# Patient Record
Sex: FEMALE | Race: WHITE | Employment: FULL TIME | URBAN - METROPOLITAN AREA
[De-identification: names, ages, dates, MRNs, and addresses within clinical notes are randomized per-mention and may not be internally consistent; named-entity substitution may affect disease eponyms.]

---

## 2017-02-08 RX ORDER — PREGABALIN 150 MG/1
150 CAPSULE ORAL DAILY
Qty: 30 CAPSULE | Refills: 2 | Status: SHIPPED | OUTPATIENT
Start: 2017-02-08 | End: 2017-05-10 | Stop reason: SDUPTHER

## 2017-03-03 DIAGNOSIS — K21.9 GASTROESOPHAGEAL REFLUX DISEASE, ESOPHAGITIS PRESENCE NOT SPECIFIED: Primary | ICD-10-CM

## 2017-03-03 DIAGNOSIS — F32.0 MILD SINGLE CURRENT EPISODE OF MAJOR DEPRESSIVE DISORDER (HCC): ICD-10-CM

## 2017-03-03 RX ORDER — BUPROPION HYDROCHLORIDE 150 MG/1
150 TABLET ORAL EVERY MORNING
Qty: 90 TABLET | Refills: 0 | Status: SHIPPED | OUTPATIENT
Start: 2017-03-03 | End: 2017-06-05 | Stop reason: SDUPTHER

## 2017-03-03 RX ORDER — BUPROPION HYDROCHLORIDE 300 MG/1
300 TABLET ORAL EVERY MORNING
Qty: 90 TABLET | Refills: 0 | Status: SHIPPED | OUTPATIENT
Start: 2017-03-03 | End: 2017-06-05 | Stop reason: SDUPTHER

## 2017-03-03 RX ORDER — PANTOPRAZOLE SODIUM 40 MG/1
40 TABLET, DELAYED RELEASE ORAL DAILY
Qty: 90 TABLET | Refills: 0 | Status: SHIPPED | OUTPATIENT
Start: 2017-03-03 | End: 2017-06-05 | Stop reason: SDUPTHER

## 2017-06-10 ENCOUNTER — TELEPHONE (OUTPATIENT)
Dept: FAMILY MEDICINE CLINIC | Age: 57
End: 2017-06-10

## 2017-06-10 RX ORDER — PREGABALIN 150 MG/1
150 CAPSULE ORAL DAILY
Qty: 30 CAPSULE | Refills: 0 | Status: SHIPPED | OUTPATIENT
Start: 2017-06-10 | End: 2017-07-11 | Stop reason: SDUPTHER

## 2017-06-10 RX ORDER — PREGABALIN 150 MG/1
150 CAPSULE ORAL DAILY
Qty: 30 CAPSULE | Refills: 0 | Status: SHIPPED | OUTPATIENT
Start: 2017-06-10 | End: 2017-06-10 | Stop reason: SDUPTHER

## 2017-06-20 ENCOUNTER — OFFICE VISIT (OUTPATIENT)
Dept: FAMILY MEDICINE CLINIC | Age: 57
End: 2017-06-20

## 2017-06-20 VITALS
DIASTOLIC BLOOD PRESSURE: 60 MMHG | BODY MASS INDEX: 21.99 KG/M2 | OXYGEN SATURATION: 99 % | HEIGHT: 70 IN | TEMPERATURE: 97.6 F | HEART RATE: 80 BPM | WEIGHT: 153.6 LBS | SYSTOLIC BLOOD PRESSURE: 120 MMHG

## 2017-06-20 DIAGNOSIS — E78.9 LIPID DISORDER: ICD-10-CM

## 2017-06-20 DIAGNOSIS — M25.562 CHRONIC PAIN OF BOTH KNEES: Primary | ICD-10-CM

## 2017-06-20 DIAGNOSIS — M25.561 CHRONIC PAIN OF BOTH KNEES: Primary | ICD-10-CM

## 2017-06-20 DIAGNOSIS — F32.0 MILD SINGLE CURRENT EPISODE OF MAJOR DEPRESSIVE DISORDER (HCC): ICD-10-CM

## 2017-06-20 DIAGNOSIS — G89.29 CHRONIC PAIN OF BOTH KNEES: Primary | ICD-10-CM

## 2017-06-20 DIAGNOSIS — E55.9 VITAMIN D DEFICIENCY: ICD-10-CM

## 2017-06-20 DIAGNOSIS — K21.9 GASTROESOPHAGEAL REFLUX DISEASE, ESOPHAGITIS PRESENCE NOT SPECIFIED: ICD-10-CM

## 2017-06-20 PROCEDURE — 99214 OFFICE O/P EST MOD 30 MIN: CPT | Performed by: FAMILY MEDICINE

## 2017-06-20 RX ORDER — MELOXICAM 15 MG/1
15 TABLET ORAL DAILY
Qty: 30 TABLET | Refills: 3 | Status: SHIPPED | OUTPATIENT
Start: 2017-06-20 | End: 2017-09-11 | Stop reason: SDUPTHER

## 2017-06-20 ASSESSMENT — ENCOUNTER SYMPTOMS
GASTROINTESTINAL NEGATIVE: 1
RESPIRATORY NEGATIVE: 1
EYES NEGATIVE: 1

## 2017-07-06 DIAGNOSIS — F32.0 MILD SINGLE CURRENT EPISODE OF MAJOR DEPRESSIVE DISORDER (HCC): ICD-10-CM

## 2017-07-06 DIAGNOSIS — K21.9 GASTROESOPHAGEAL REFLUX DISEASE, ESOPHAGITIS PRESENCE NOT SPECIFIED: ICD-10-CM

## 2017-07-06 RX ORDER — PANTOPRAZOLE SODIUM 40 MG/1
40 TABLET, DELAYED RELEASE ORAL DAILY
Qty: 90 TABLET | Refills: 1 | Status: SHIPPED | OUTPATIENT
Start: 2017-07-06 | End: 2017-10-10 | Stop reason: SDUPTHER

## 2017-07-06 RX ORDER — BUPROPION HYDROCHLORIDE 150 MG/1
150 TABLET ORAL EVERY MORNING
Qty: 90 TABLET | Refills: 1 | Status: SHIPPED | OUTPATIENT
Start: 2017-07-06 | End: 2017-10-11 | Stop reason: SDUPTHER

## 2017-07-06 RX ORDER — BUPROPION HYDROCHLORIDE 300 MG/1
300 TABLET ORAL EVERY MORNING
Qty: 90 TABLET | Refills: 1 | Status: SHIPPED | OUTPATIENT
Start: 2017-07-06 | End: 2017-11-24 | Stop reason: SDUPTHER

## 2017-07-11 RX ORDER — PREGABALIN 150 MG/1
150 CAPSULE ORAL DAILY
Qty: 30 CAPSULE | Refills: 0 | Status: CANCELLED | OUTPATIENT
Start: 2017-07-11

## 2017-07-11 RX ORDER — PREGABALIN 150 MG/1
150 CAPSULE ORAL DAILY
Qty: 90 CAPSULE | Refills: 0 | Status: SHIPPED | OUTPATIENT
Start: 2017-07-11 | End: 2017-09-11 | Stop reason: SDUPTHER

## 2017-09-03 RX ORDER — TRAZODONE HYDROCHLORIDE 100 MG/1
200 TABLET ORAL NIGHTLY
Qty: 180 TABLET | Refills: 1 | Status: SHIPPED | OUTPATIENT
Start: 2017-09-03 | End: 2018-03-06 | Stop reason: SDUPTHER

## 2017-09-11 DIAGNOSIS — G89.29 CHRONIC PAIN OF BOTH KNEES: ICD-10-CM

## 2017-09-11 DIAGNOSIS — M25.561 CHRONIC PAIN OF BOTH KNEES: ICD-10-CM

## 2017-09-11 DIAGNOSIS — M25.562 CHRONIC PAIN OF BOTH KNEES: ICD-10-CM

## 2017-09-11 RX ORDER — PREGABALIN 150 MG/1
150 CAPSULE ORAL DAILY
Qty: 90 CAPSULE | Refills: 0 | Status: SHIPPED | OUTPATIENT
Start: 2017-09-11 | End: 2018-01-06 | Stop reason: SDUPTHER

## 2017-09-11 RX ORDER — MELOXICAM 15 MG/1
15 TABLET ORAL DAILY
Qty: 90 TABLET | Refills: 1 | Status: SHIPPED | OUTPATIENT
Start: 2017-09-11 | End: 2018-05-15 | Stop reason: ALTCHOICE

## 2017-10-10 DIAGNOSIS — K21.9 GASTROESOPHAGEAL REFLUX DISEASE, ESOPHAGITIS PRESENCE NOT SPECIFIED: ICD-10-CM

## 2017-10-10 RX ORDER — PANTOPRAZOLE SODIUM 40 MG/1
40 TABLET, DELAYED RELEASE ORAL DAILY
Qty: 90 TABLET | Refills: 1 | Status: SHIPPED | OUTPATIENT
Start: 2017-10-10 | End: 2018-02-20 | Stop reason: ALTCHOICE

## 2017-10-11 DIAGNOSIS — F32.0 MILD SINGLE CURRENT EPISODE OF MAJOR DEPRESSIVE DISORDER (HCC): ICD-10-CM

## 2017-10-11 RX ORDER — BUPROPION HYDROCHLORIDE 150 MG/1
150 TABLET ORAL EVERY MORNING
Qty: 90 TABLET | Refills: 1 | Status: SHIPPED | OUTPATIENT
Start: 2017-10-11 | End: 2018-02-20 | Stop reason: ALTCHOICE

## 2017-10-26 DIAGNOSIS — K21.9 GASTROESOPHAGEAL REFLUX DISEASE, ESOPHAGITIS PRESENCE NOT SPECIFIED: ICD-10-CM

## 2017-10-26 RX ORDER — RANITIDINE 150 MG/1
150 TABLET ORAL 2 TIMES DAILY
Qty: 180 TABLET | Refills: 0 | Status: SHIPPED | OUTPATIENT
Start: 2017-10-26 | End: 2018-03-10 | Stop reason: SDUPTHER

## 2017-11-24 DIAGNOSIS — F32.0 MILD SINGLE CURRENT EPISODE OF MAJOR DEPRESSIVE DISORDER (HCC): ICD-10-CM

## 2017-11-24 RX ORDER — BUPROPION HYDROCHLORIDE 300 MG/1
300 TABLET ORAL EVERY MORNING
Qty: 90 TABLET | Refills: 1 | Status: SHIPPED | OUTPATIENT
Start: 2017-11-24 | End: 2018-07-16 | Stop reason: SDUPTHER

## 2018-01-06 ENCOUNTER — TELEPHONE (OUTPATIENT)
Dept: FAMILY MEDICINE CLINIC | Age: 58
End: 2018-01-06

## 2018-01-06 RX ORDER — PREGABALIN 150 MG/1
150 CAPSULE ORAL DAILY
Qty: 90 CAPSULE | Refills: 0 | OUTPATIENT
Start: 2018-01-06 | End: 2018-04-03 | Stop reason: SDUPTHER

## 2018-02-07 ENCOUNTER — TELEPHONE (OUTPATIENT)
Dept: FAMILY MEDICINE CLINIC | Age: 58
End: 2018-02-07

## 2018-02-20 ENCOUNTER — OFFICE VISIT (OUTPATIENT)
Dept: GYNECOLOGY | Age: 58
End: 2018-02-20

## 2018-02-20 VITALS
TEMPERATURE: 97.2 F | HEART RATE: 65 BPM | BODY MASS INDEX: 22.54 KG/M2 | SYSTOLIC BLOOD PRESSURE: 128 MMHG | DIASTOLIC BLOOD PRESSURE: 86 MMHG | HEIGHT: 70 IN | WEIGHT: 157.4 LBS | RESPIRATION RATE: 17 BRPM

## 2018-02-20 DIAGNOSIS — N89.8 VAGINAL DRYNESS: ICD-10-CM

## 2018-02-20 DIAGNOSIS — R39.15 URINARY URGENCY: ICD-10-CM

## 2018-02-20 DIAGNOSIS — Z80.3 FAMILY HISTORY OF BREAST CANCER: ICD-10-CM

## 2018-02-20 DIAGNOSIS — Z01.419 WELL WOMAN EXAM WITH ROUTINE GYNECOLOGICAL EXAM: Primary | ICD-10-CM

## 2018-02-20 DIAGNOSIS — N81.10 VAGINAL WALL PROLAPSE: ICD-10-CM

## 2018-02-20 PROCEDURE — 99396 PREV VISIT EST AGE 40-64: CPT | Performed by: OBSTETRICS & GYNECOLOGY

## 2018-02-20 RX ORDER — ESTRADIOL 10 UG/1
10 INSERT VAGINAL
Qty: 24 TABLET | Refills: 3 | Status: SHIPPED | OUTPATIENT
Start: 2018-02-22 | End: 2018-11-15 | Stop reason: ALTCHOICE

## 2018-02-20 ASSESSMENT — ENCOUNTER SYMPTOMS
EYES NEGATIVE: 1
GASTROINTESTINAL NEGATIVE: 1
RESPIRATORY NEGATIVE: 1

## 2018-02-21 NOTE — PROGRESS NOTES
 Not on file     Social History Narrative    No narrative on file     No Known Allergies  Outpatient Prescriptions Marked as Taking for the 2/20/18 encounter (Office Visit) with Benedicto Arredondo MD   Medication Sig Dispense Refill    Esomeprazole Magnesium (NEXIUM PO) Take by mouth      [START ON 2/22/2018] Estradiol (VAGIFEM) 10 MCG TABS vaginal tablet Place 1 tablet vaginally Twice a Week 24 tablet 3    pregabalin (LYRICA) 150 MG capsule Take 1 capsule by mouth daily for 90 days. 90 capsule 0    buPROPion (WELLBUTRIN XL) 300 MG extended release tablet Take 1 tablet by mouth every morning 90 tablet 1    ranitidine (ZANTAC) 150 MG tablet Take 1 tablet by mouth 2 times daily 180 tablet 0    meloxicam (MOBIC) 15 MG tablet Take 1 tablet by mouth daily 90 tablet 1    traZODone (DESYREL) 100 MG tablet Take 2 tablets by mouth nightly 180 tablet 1    ibuprofen (ADVIL;MOTRIN) 200 MG tablet Take 200 mg by mouth every 6 hours as needed for Pain. Family History   Problem Relation Age of Onset    High Blood Pressure Mother     High Cholesterol Mother     High Blood Pressure Father     High Cholesterol Father     Arthritis Father     Arthritis Paternal Grandmother     Rheum Arthritis Neg Hx     Osteoarthritis Neg Hx     Asthma Neg Hx     Breast Cancer Neg Hx     Cancer Neg Hx     Diabetes Neg Hx     Heart Failure Neg Hx     Hypertension Neg Hx     Migraines Neg Hx     Ovarian Cancer Neg Hx     Rashes/Skin Problems Neg Hx     Seizures Neg Hx     Stroke Neg Hx     Thyroid Disease Neg Hx      /86 (Site: Right Arm, Position: Sitting, Cuff Size: Medium Adult)   Pulse 65   Temp 97.2 °F (36.2 °C) (Oral)   Resp 17   Ht 5' 10\" (1.778 m)   Wt 157 lb 6.4 oz (71.4 kg)   LMP  (LMP Unknown)   Breastfeeding? No   BMI 22.58 kg/m²       Objective:   Physical Exam   Constitutional: She is oriented to person, place, and time. She appears well-developed and well-nourished. No distress.    HENT: Assessment:      1. Annual  2. Vaginal atrophy  3. Cystocele, ? Dropping vaginal apex  4. Urinary urgency  5. Family hx breast Ca      Plan:       1. Pap, calcium, exercise, mammogram, hemocult negative  2. vagifem  3 and 4. Referral to pelvic  given findings. Check UC as well. 5. Mammogram with dense breasts but I see this a lot.  Given hx-feel referral to breast MD better option for future evaluation

## 2018-02-22 LAB — URINE CULTURE, ROUTINE: NORMAL

## 2018-02-23 LAB
HPV COMMENT: NORMAL
HPV TYPE 16: NOT DETECTED
HPV TYPE 18: NOT DETECTED
HPVOH (OTHER TYPES): NOT DETECTED

## 2018-03-06 RX ORDER — TRAZODONE HYDROCHLORIDE 100 MG/1
200 TABLET ORAL NIGHTLY
Qty: 180 TABLET | Refills: 1 | Status: SHIPPED | OUTPATIENT
Start: 2018-03-06 | End: 2018-07-15

## 2018-03-10 DIAGNOSIS — K21.9 GASTROESOPHAGEAL REFLUX DISEASE, ESOPHAGITIS PRESENCE NOT SPECIFIED: ICD-10-CM

## 2018-03-11 RX ORDER — RANITIDINE 150 MG/1
150 TABLET ORAL 2 TIMES DAILY
Qty: 180 TABLET | Refills: 1 | Status: SHIPPED | OUTPATIENT
Start: 2018-03-11 | End: 2018-11-15 | Stop reason: ALTCHOICE

## 2018-05-15 ENCOUNTER — OFFICE VISIT (OUTPATIENT)
Dept: FAMILY MEDICINE CLINIC | Age: 58
End: 2018-05-15

## 2018-05-15 VITALS
WEIGHT: 161 LBS | BODY MASS INDEX: 23.1 KG/M2 | DIASTOLIC BLOOD PRESSURE: 84 MMHG | TEMPERATURE: 98 F | SYSTOLIC BLOOD PRESSURE: 128 MMHG

## 2018-05-15 DIAGNOSIS — G89.29 CHRONIC PAIN OF BOTH KNEES: Primary | ICD-10-CM

## 2018-05-15 DIAGNOSIS — M25.562 CHRONIC PAIN OF BOTH KNEES: Primary | ICD-10-CM

## 2018-05-15 DIAGNOSIS — M25.561 CHRONIC PAIN OF BOTH KNEES: Primary | ICD-10-CM

## 2018-05-15 DIAGNOSIS — Z11.59 ENCOUNTER FOR HEPATITIS C SCREENING TEST FOR LOW RISK PATIENT: ICD-10-CM

## 2018-05-15 DIAGNOSIS — G89.4 CHRONIC PAIN SYNDROME: ICD-10-CM

## 2018-05-15 DIAGNOSIS — E78.2 MIXED HYPERLIPIDEMIA: ICD-10-CM

## 2018-05-15 DIAGNOSIS — E55.9 VITAMIN D DEFICIENCY: ICD-10-CM

## 2018-05-15 DIAGNOSIS — K21.9 GASTROESOPHAGEAL REFLUX DISEASE, ESOPHAGITIS PRESENCE NOT SPECIFIED: ICD-10-CM

## 2018-05-15 DIAGNOSIS — Z11.4 SCREENING FOR HIV (HUMAN IMMUNODEFICIENCY VIRUS): ICD-10-CM

## 2018-05-15 PROCEDURE — 99214 OFFICE O/P EST MOD 30 MIN: CPT | Performed by: FAMILY MEDICINE

## 2018-05-15 RX ORDER — PREGABALIN 150 MG/1
CAPSULE ORAL
Qty: 90 CAPSULE | Refills: 1 | Status: SHIPPED | OUTPATIENT
Start: 2018-05-15 | End: 2018-11-15 | Stop reason: SDUPTHER

## 2018-05-15 ASSESSMENT — ENCOUNTER SYMPTOMS
RESPIRATORY NEGATIVE: 1
GASTROINTESTINAL NEGATIVE: 1
EYES NEGATIVE: 1

## 2018-07-15 DIAGNOSIS — F32.0 MILD SINGLE CURRENT EPISODE OF MAJOR DEPRESSIVE DISORDER (HCC): ICD-10-CM

## 2018-07-15 RX ORDER — BUPROPION HYDROCHLORIDE 150 MG/1
150 TABLET ORAL EVERY MORNING
Qty: 90 TABLET | Refills: 0 | Status: SHIPPED | OUTPATIENT
Start: 2018-07-15 | End: 2018-11-15 | Stop reason: ALTCHOICE

## 2018-07-15 NOTE — TELEPHONE ENCOUNTER
Have you picked out a new primary care physician? Because I am retiring after July 27th. Pratima Bansal is no longer able to see you at the office. If not, please call [phone] to help locate a [facility] doctor near to your home or office.

## 2018-07-16 DIAGNOSIS — F32.0 MILD SINGLE CURRENT EPISODE OF MAJOR DEPRESSIVE DISORDER (HCC): ICD-10-CM

## 2018-07-16 RX ORDER — BUPROPION HYDROCHLORIDE 300 MG/1
300 TABLET ORAL EVERY MORNING
Qty: 90 TABLET | Refills: 0 | Status: SHIPPED | OUTPATIENT
Start: 2018-07-16 | End: 2018-11-15 | Stop reason: SDUPTHER

## 2018-07-16 NOTE — TELEPHONE ENCOUNTER
Have you picked out a new primary care physician? Because I am retiring after July 27th. Ulises Ortiz is no longer able to see you at the office. If not, please call [phone] to help locate a [facility] doctor near to your home or office.

## 2018-08-22 ENCOUNTER — TELEPHONE (OUTPATIENT)
Dept: FAMILY MEDICINE CLINIC | Age: 58
End: 2018-08-22

## 2018-10-25 ENCOUNTER — TELEPHONE (OUTPATIENT)
Dept: FAMILY MEDICINE CLINIC | Age: 58
End: 2018-10-25

## 2018-10-26 RX ORDER — PREGABALIN 150 MG/1
CAPSULE ORAL
Qty: 90 CAPSULE | Refills: 0 | OUTPATIENT
Start: 2018-10-26 | End: 2019-10-26

## 2018-10-26 NOTE — TELEPHONE ENCOUNTER
Attempted to call patient. Lyrica is scheduled medication. Patient must be seen, submit to UDS, have CDA on file even for bridge until sees new PCP on 12/7/18. Please advise patient, she must be seen for refill. Please schedule at patient's convenience, it sounds like she has 14 pills still on hand. Magda Galeazzi.  Keyanna Hahn.  10/26/2018  9:54 AM

## 2018-11-15 ENCOUNTER — OFFICE VISIT (OUTPATIENT)
Dept: FAMILY MEDICINE CLINIC | Age: 58
End: 2018-11-15
Payer: COMMERCIAL

## 2018-11-15 VITALS
SYSTOLIC BLOOD PRESSURE: 106 MMHG | DIASTOLIC BLOOD PRESSURE: 70 MMHG | BODY MASS INDEX: 22.85 KG/M2 | HEIGHT: 70 IN | HEART RATE: 62 BPM | WEIGHT: 159.6 LBS | OXYGEN SATURATION: 98 %

## 2018-11-15 DIAGNOSIS — M25.561 CHRONIC PAIN OF BOTH KNEES: ICD-10-CM

## 2018-11-15 DIAGNOSIS — M25.562 CHRONIC PAIN OF BOTH KNEES: ICD-10-CM

## 2018-11-15 DIAGNOSIS — E55.9 VITAMIN D DEFICIENCY: ICD-10-CM

## 2018-11-15 DIAGNOSIS — G89.29 CHRONIC PAIN OF BOTH SHOULDERS: ICD-10-CM

## 2018-11-15 DIAGNOSIS — M25.511 CHRONIC PAIN OF BOTH SHOULDERS: ICD-10-CM

## 2018-11-15 DIAGNOSIS — G89.29 CHRONIC PAIN OF BOTH KNEES: ICD-10-CM

## 2018-11-15 DIAGNOSIS — Z76.89 ENCOUNTER TO ESTABLISH CARE: ICD-10-CM

## 2018-11-15 DIAGNOSIS — F32.0 MILD SINGLE CURRENT EPISODE OF MAJOR DEPRESSIVE DISORDER (HCC): Primary | ICD-10-CM

## 2018-11-15 DIAGNOSIS — M25.512 CHRONIC PAIN OF BOTH SHOULDERS: ICD-10-CM

## 2018-11-15 PROCEDURE — 90471 IMMUNIZATION ADMIN: CPT | Performed by: NURSE PRACTITIONER

## 2018-11-15 PROCEDURE — 90686 IIV4 VACC NO PRSV 0.5 ML IM: CPT | Performed by: NURSE PRACTITIONER

## 2018-11-15 PROCEDURE — 99214 OFFICE O/P EST MOD 30 MIN: CPT | Performed by: NURSE PRACTITIONER

## 2018-11-15 RX ORDER — PREGABALIN 150 MG/1
150 CAPSULE ORAL DAILY
Qty: 90 CAPSULE | Refills: 1 | Status: SHIPPED | OUTPATIENT
Start: 2018-11-15 | End: 2019-04-18 | Stop reason: SDUPTHER

## 2018-11-15 RX ORDER — BUPROPION HYDROCHLORIDE 300 MG/1
300 TABLET ORAL EVERY MORNING
Qty: 90 TABLET | Refills: 1 | Status: SHIPPED | OUTPATIENT
Start: 2018-11-15 | End: 2019-04-18 | Stop reason: SDUPTHER

## 2018-11-15 ASSESSMENT — ENCOUNTER SYMPTOMS: SHORTNESS OF BREATH: 0

## 2018-11-15 NOTE — PATIENT INSTRUCTIONS
Patient Education        Depression Treatment: Care Instructions  Your Care Instructions    Depression is a condition that affects the way you feel, think, and act. It causes symptoms such as low energy, loss of interest in daily activities, and sadness or grouchiness that goes on for a long time. Depression is very common and affects men and women of all ages. Depression is a medical illness caused by changes in the natural chemicals in your brain. It is not a character flaw, and it does not mean that you are a bad or weak person. It does not mean that you are going crazy. It is important to know that depression can be treated. Medicines, counseling, and self-care can all help. Many people do not get help because they are embarrassed or think that they will get over the depression on their own. But some people do not get better without treatment. Follow-up care is a key part of your treatment and safety. Be sure to make and go to all appointments, and call your doctor if you are having problems. It's also a good idea to know your test results and keep a list of the medicines you take. How can you care for yourself at home? Learn about antidepressant medicines  Antidepressant medicines can improve or end the symptoms of depression. You may need to take the medicine for at least 6 months, and often longer. Keep taking your medicine even if you feel better. If you stop taking it too soon, your symptoms may come back or get worse. You may start to feel better within 1 to 3 weeks of taking antidepressant medicine. But it can take as many as 6 to 8 weeks to see more improvement. Talk to your doctor if you have problems with your medicine or if you do not notice any improvement after 3 weeks. Antidepressants can make you feel tired, dizzy, or nervous. Some people have dry mouth, constipation, headaches, sexual problems, an upset stomach, or diarrhea.  Many of these side effects are mild and go away on their own after you take the medicine for a few weeks. Some may last longer. Talk to your doctor if side effects bother you too much. You might be able to try a different medicine. If you are pregnant or breastfeeding, talk to your doctor about what medicines you can take. Learn about counseling  In many cases, counseling can work as well as medicines to treat mild to moderate depression. Counseling is done by licensed mental health providers, such as psychologists, social workers, and some types of nurses. It can be done in one-on-one sessions or in a group setting. Many people find group sessions helpful. Cognitive-behavioral therapy is a type of counseling. In this treatment therapy, you learn how to see and change unhelpful thinking styles that may be adding to your depression. Counseling and medicines often work well when used together. To manage depression  · Be physically active. Getting 30 minutes of exercise each day is good for your body and your mind. Begin slowly if it is hard for you to get started. If you already exercise, keep it up. · Plan something pleasant for yourself every day. Include activities that you have enjoyed in the past.  · Get enough sleep. Talk to your doctor if you have problems sleeping. · Eat a balanced diet. If you do not feel hungry, eat small snacks rather than large meals. · Do not drink alcohol, use illegal drugs, or take medicines that your doctor has not prescribed for you. They may interfere with your treatment. · Spend time with family and friends. It may help to speak openly about your depression with people you trust.  · Take your medicines exactly as prescribed. Call your doctor if you think you are having a problem with your medicine. · Do not make major life decisions while you are depressed. Depression may change the way you think. You will be able to make better decisions after you feel better. · Think positively.  Challenge negative thoughts with statements such as

## 2019-04-18 ENCOUNTER — OFFICE VISIT (OUTPATIENT)
Dept: FAMILY MEDICINE CLINIC | Age: 59
End: 2019-04-18
Payer: COMMERCIAL

## 2019-04-18 VITALS
OXYGEN SATURATION: 99 % | BODY MASS INDEX: 22.83 KG/M2 | WEIGHT: 158 LBS | DIASTOLIC BLOOD PRESSURE: 70 MMHG | HEART RATE: 64 BPM | SYSTOLIC BLOOD PRESSURE: 104 MMHG

## 2019-04-18 DIAGNOSIS — Z11.59 NEED FOR HEPATITIS C SCREENING TEST: ICD-10-CM

## 2019-04-18 DIAGNOSIS — M25.562 CHRONIC PAIN OF BOTH KNEES: ICD-10-CM

## 2019-04-18 DIAGNOSIS — Z00.00 WELL ADULT EXAM: Primary | ICD-10-CM

## 2019-04-18 DIAGNOSIS — M25.511 CHRONIC PAIN OF BOTH SHOULDERS: ICD-10-CM

## 2019-04-18 DIAGNOSIS — M25.561 CHRONIC PAIN OF BOTH KNEES: ICD-10-CM

## 2019-04-18 DIAGNOSIS — Z11.4 ENCOUNTER FOR SCREENING FOR HIV: ICD-10-CM

## 2019-04-18 DIAGNOSIS — M81.0 POSTMENOPAUSAL OSTEOPOROSIS: ICD-10-CM

## 2019-04-18 DIAGNOSIS — G89.29 CHRONIC PAIN OF BOTH KNEES: ICD-10-CM

## 2019-04-18 DIAGNOSIS — F32.0 CURRENT MILD EPISODE OF MAJOR DEPRESSIVE DISORDER WITHOUT PRIOR EPISODE (HCC): ICD-10-CM

## 2019-04-18 DIAGNOSIS — Z00.00 WELL ADULT EXAM: ICD-10-CM

## 2019-04-18 DIAGNOSIS — Z12.11 SCREENING FOR COLON CANCER: ICD-10-CM

## 2019-04-18 DIAGNOSIS — M25.512 CHRONIC PAIN OF BOTH SHOULDERS: ICD-10-CM

## 2019-04-18 DIAGNOSIS — G89.29 CHRONIC PAIN OF BOTH SHOULDERS: ICD-10-CM

## 2019-04-18 LAB
A/G RATIO: 1.9 (ref 1.1–2.2)
ALBUMIN SERPL-MCNC: 4.5 G/DL (ref 3.4–5)
ALP BLD-CCNC: 65 U/L (ref 40–129)
ALT SERPL-CCNC: 11 U/L (ref 10–40)
ANION GAP SERPL CALCULATED.3IONS-SCNC: 10 MMOL/L (ref 3–16)
AST SERPL-CCNC: 15 U/L (ref 15–37)
BASOPHILS ABSOLUTE: 0 K/UL (ref 0–0.2)
BASOPHILS RELATIVE PERCENT: 0.9 %
BILIRUB SERPL-MCNC: 0.5 MG/DL (ref 0–1)
BILIRUBIN, POC: NORMAL
BLOOD URINE, POC: NORMAL
BUN BLDV-MCNC: 11 MG/DL (ref 7–20)
CALCIUM SERPL-MCNC: 9.8 MG/DL (ref 8.3–10.6)
CHLORIDE BLD-SCNC: 101 MMOL/L (ref 99–110)
CHOLESTEROL, FASTING: 219 MG/DL (ref 0–199)
CLARITY, POC: NORMAL
CO2: 27 MMOL/L (ref 21–32)
COLOR, POC: YELLOW
CREAT SERPL-MCNC: 1 MG/DL (ref 0.6–1.1)
EOSINOPHILS ABSOLUTE: 0.2 K/UL (ref 0–0.6)
EOSINOPHILS RELATIVE PERCENT: 4 %
GFR AFRICAN AMERICAN: >60
GFR NON-AFRICAN AMERICAN: 57
GLOBULIN: 2.4 G/DL
GLUCOSE FASTING: 100 MG/DL (ref 70–99)
GLUCOSE URINE, POC: NORMAL
HCT VFR BLD CALC: 39.5 % (ref 36–48)
HDLC SERPL-MCNC: 77 MG/DL (ref 40–60)
HEMOGLOBIN: 13.3 G/DL (ref 12–16)
HEPATITIS C ANTIBODY INTERPRETATION: NORMAL
KETONES, POC: NORMAL
LDL CHOLESTEROL CALCULATED: 132 MG/DL
LEUKOCYTE EST, POC: NORMAL
LYMPHOCYTES ABSOLUTE: 1.2 K/UL (ref 1–5.1)
LYMPHOCYTES RELATIVE PERCENT: 26.8 %
MCH RBC QN AUTO: 29.7 PG (ref 26–34)
MCHC RBC AUTO-ENTMCNC: 33.7 G/DL (ref 31–36)
MCV RBC AUTO: 88.2 FL (ref 80–100)
MONOCYTES ABSOLUTE: 0.5 K/UL (ref 0–1.3)
MONOCYTES RELATIVE PERCENT: 10.3 %
NEUTROPHILS ABSOLUTE: 2.6 K/UL (ref 1.7–7.7)
NEUTROPHILS RELATIVE PERCENT: 58 %
NITRITE, POC: NORMAL
PDW BLD-RTO: 12.8 % (ref 12.4–15.4)
PH, POC: 6
PLATELET # BLD: 294 K/UL (ref 135–450)
PMV BLD AUTO: 8.7 FL (ref 5–10.5)
POTASSIUM SERPL-SCNC: 5.1 MMOL/L (ref 3.5–5.1)
PROTEIN, POC: NORMAL
RBC # BLD: 4.48 M/UL (ref 4–5.2)
SODIUM BLD-SCNC: 138 MMOL/L (ref 136–145)
SPECIFIC GRAVITY, POC: >=1.03
TOTAL PROTEIN: 6.9 G/DL (ref 6.4–8.2)
TRIGLYCERIDE, FASTING: 49 MG/DL (ref 0–150)
TSH REFLEX FT4: 2.45 UIU/ML (ref 0.27–4.2)
UROBILINOGEN, POC: 0.2
VITAMIN D 25-HYDROXY: 30.6 NG/ML
VLDLC SERPL CALC-MCNC: 10 MG/DL
WBC # BLD: 4.6 K/UL (ref 4–11)

## 2019-04-18 PROCEDURE — 99396 PREV VISIT EST AGE 40-64: CPT | Performed by: NURSE PRACTITIONER

## 2019-04-18 PROCEDURE — 81002 URINALYSIS NONAUTO W/O SCOPE: CPT | Performed by: NURSE PRACTITIONER

## 2019-04-18 RX ORDER — PREGABALIN 150 MG/1
150 CAPSULE ORAL DAILY
Qty: 90 CAPSULE | Refills: 3 | Status: SHIPPED | OUTPATIENT
Start: 2019-04-18 | End: 2019-05-06 | Stop reason: SDUPTHER

## 2019-04-18 RX ORDER — TRAZODONE HYDROCHLORIDE 100 MG/1
200 TABLET ORAL NIGHTLY
Qty: 180 TABLET | Refills: 3 | Status: SHIPPED | OUTPATIENT
Start: 2019-04-18 | End: 2020-04-15

## 2019-04-18 RX ORDER — DICLOFENAC SODIUM 75 MG/1
75 TABLET, DELAYED RELEASE ORAL 2 TIMES DAILY
Qty: 60 TABLET | Refills: 5 | Status: SHIPPED | OUTPATIENT
Start: 2019-04-18 | End: 2019-06-25 | Stop reason: SDUPTHER

## 2019-04-18 RX ORDER — BUPROPION HYDROCHLORIDE 300 MG/1
300 TABLET ORAL EVERY MORNING
Qty: 90 TABLET | Refills: 3 | Status: SHIPPED | OUTPATIENT
Start: 2019-04-18 | End: 2019-09-02 | Stop reason: SDUPTHER

## 2019-04-18 NOTE — PATIENT INSTRUCTIONS
Patient Education        Well Visit, Women 48 to 72: Care Instructions  Your Care Instructions    Physical exams can help you stay healthy. Your doctor has checked your overall health and may have suggested ways to take good care of yourself. He or she also may have recommended tests. At home, you can help prevent illness with healthy eating, regular exercise, and other steps. Follow-up care is a key part of your treatment and safety. Be sure to make and go to all appointments, and call your doctor if you are having problems. It's also a good idea to know your test results and keep a list of the medicines you take. How can you care for yourself at home? · Reach and stay at a healthy weight. This will lower your risk for many problems, such as obesity, diabetes, heart disease, and high blood pressure. · Get at least 30 minutes of exercise on most days of the week. Walking is a good choice. You also may want to do other activities, such as running, swimming, cycling, or playing tennis or team sports. · Do not smoke. Smoking can make health problems worse. If you need help quitting, talk to your doctor about stop-smoking programs and medicines. These can increase your chances of quitting for good. · Protect your skin from too much sun. When you're outdoors from 10 a.m. to 4 p.m., stay in the shade or cover up with clothing and a hat with a wide brim. Wear sunglasses that block UV rays. Even when it's cloudy, put broad-spectrum sunscreen (SPF 30 or higher) on any exposed skin. · See a dentist one or two times a year for checkups and to have your teeth cleaned. · Wear a seat belt in the car. · Limit alcohol to 1 drink a day. Too much alcohol can cause health problems. Follow your doctor's advice about when to have certain tests. These tests can spot problems early. · Cholesterol.  Your doctor will tell you how often to have this done based on your age, family history, or other things that can increase your risk for heart attack and stroke. · Blood pressure. Have your blood pressure checked during a routine doctor visit. Your doctor will tell you how often to check your blood pressure based on your age, your blood pressure results, and other factors. · Mammogram. Ask your doctor how often you should have a mammogram, which is an X-ray of your breasts. A mammogram can spot breast cancer before it can be felt and when it is easiest to treat. · Pap test and pelvic exam. Ask your doctor how often you should have a Pap test. You may not need to have a Pap test as often as you used to. · Vision. Have your eyes checked every year or two or as often as your doctor suggests. Some experts recommend that you have yearly exams for glaucoma and other age-related eye problems starting at age 48. · Hearing. Tell your doctor if you notice any change in your hearing. You can have tests to find out how well you hear. · Diabetes. Ask your doctor whether you should have tests for diabetes. · Colon cancer. You should begin tests for colon cancer at age 48. You may have one of several tests. Your doctor will tell you how often to have tests based on your age and risk. Risks include whether you already had a precancerous polyp removed from your colon or whether your parents, sisters and brothers, or children have had colon cancer. · Thyroid disease. Talk to your doctor about whether to have your thyroid checked as part of a regular physical exam. Women have an increased chance of a thyroid problem. · Osteoporosis. You should begin tests for bone density at age 72. If you are younger than 72, ask your doctor whether you have factors that may increase your risk for this disease. You may want to have this test before age 72. · Heart attack and stroke risk. At least every 4 to 6 years, you should have your risk for heart attack and stroke assessed.  Your doctor uses factors such as your age, blood pressure, cholesterol, and whether you smoke or have diabetes to show what your risk for a heart attack or stroke is over the next 10 years. When should you call for help? Watch closely for changes in your health, and be sure to contact your doctor if you have any problems or symptoms that concern you. Where can you learn more? Go to [email or web address] and sign in to your [employer] account. Enter E756 in the [employer] box to learn more about \"Well Visit, Women 50 to 72: Care Instructions. \"     If you do not have an account, please click on the \"Sign Up Now\" link. Current as of: March 28, 2018  Content Version: 11.9  © 0365-2230 "[employer] Incorporated. Care instructions adapted under license by [facility] ([facility]). If you have questions about a medical condition or this instruction, always ask your healthcare professional. [address] any warranty or liability for your use of this information.

## 2019-04-18 NOTE — PROGRESS NOTES
Chief Complaint:   James Whitfield is a 63 y.o. female who presents for complete physical examination. History of Present Illness:    Aching pain above knees. Present for years. Aching pain from knees up into thighs. Squeezing legs helps with pain. No previous eval. States pain preventing her from being more active. Denies redness or swelling. Meloxicam in past without improvement. No recent eval or treatment    Depression: well controlled at present with Wellbutrin and Trazadone. Compliant with daily use, no side effects to report. Chronic shoulder pain: well controlled with Lyrica. Compliant with use. No side effects. Past Medical History:   Diagnosis Date    Alcoholism [facility])     Recovering    Degenerative joint disease     Depression     Fibrocystic disease of breast 6/15/2010    GERD (gastroesophageal reflux disease)     Headache(784.0)     Hemorrhoid 6/15/2010    Mitral valve regurgitation     Osteoporosis     Screening mammogram, encounter for 02/04/2018    Dr Abraham Armstrong MD    Vitamin D deficiency 3/16/2012       Past Surgical History:   Procedure Laterality Date    BLADDER SURGERY      with hysterectomy-vaginal approach    BREAST SURGERY  2006    benign cyst on left    COLONOSCOPY  11/30/2012    Dr Roldan Phlegm      facial 12.11    FACIAL COSMETIC SURGERY      HYSTERECTOMY  1996    SALPINGO-OOPHORECTOMY      still with left ovary       Outpatient Medications Marked as Taking for the 4/18/19 encounter (Office Visit) with LANCE Gaffney CNP   Medication Sig Dispense Refill    traZODone (DESYREL) 100 MG tablet Take 2 tablets by mouth nightly 180 tablet 3    pregabalin (LYRICA) 150 MG capsule Take 1 capsule by mouth daily.  TAKE ONE CAPSULE BY MOUTH EVERY DAY , 90 capsule 3    buPROPion (WELLBUTRIN XL) 300 MG extended release tablet Take 1 tablet by mouth every morning 90 tablet 3    diclofenac (VOLTAREN) 75 MG EC tablet Take 1 tablet by mouth 2 times daily 60 tablet 5    Omega-3 Fatty Acids (FISH OIL PO) Take by mouth      Cholecalciferol (VITAMIN D PO) Take by mouth      Esomeprazole Magnesium (NEXIUM PO) Take by mouth      ibuprofen (ADVIL;MOTRIN) 200 MG tablet Take 200 mg by mouth every 6 hours as needed for Pain. No Known Allergies    Social History     Socioeconomic History    Marital status:      Spouse name: None    Number of children: None    Years of education: None    Highest education level: None   Occupational History    None   Social Needs    Financial resource strain: None    Food insecurity:     Worry: None     Inability: None    Transportation needs:     Medical: None     Non-medical: None   Tobacco Use    Smoking status: Never Smoker    Smokeless tobacco: Never Used   Substance and Sexual Activity    Alcohol use: No     Alcohol/week: 0.0 oz    Drug use: No    Sexual activity: Yes     Partners: Male   Lifestyle    Physical activity:     Days per week: None     Minutes per session: None    Stress: None   Relationships    Social connections:     Talks on phone: None     Gets together: None     Attends Anabaptism service: None     Active member of club or organization: None     Attends meetings of clubs or organizations: None     Relationship status: None    Intimate partner violence:     Fear of current or ex partner: None     Emotionally abused: None     Physically abused: None     Forced sexual activity: None   Other Topics Concern    None   Social History Narrative    None       Family history was updated.     Health Maintenance   Topic Date Due    Hepatitis C screen  1960    HIV screen  05/05/1976    Colon cancer screen colonoscopy  11/30/2015    Shingles Vaccine (1 of 2) 04/18/2020 (Originally 5/5/2011)    Breast cancer screen  02/08/2020    Lipid screen  10/16/2020    DTaP/Tdap/Td vaccine (2 - Td) 04/03/2023    Flu vaccine  Completed    Pneumococcal 0-64 years Vaccine  Aged Out       Last eye exam: couple years  Last dental exam: 1 month ago  Regular exercise: no   Balanced diet: mostly well balanced      Review Of Systems:  Skin: no changing moles, abnormal pigmentation, rash, scaling, itching, masses, hair or nail changes  Eyes: no blurring, diplopia, or eye pain  Ears/Nose/Throat: no hearing loss, tinnitus, vertigo, nosebleed, nasal congestion, rhinorrhea, sore throat  Respiratory: no cough, pleuritic chest pain, dyspnea, or wheezing  Cardiovascular: no angina, MASON, orthopnea, palpitations  Gastrointestinal: no nausea, vomiting, heartburn, diarrhea, constipation, bloating, or abdominal pain  Genitourinary: no urinary urgency, frequency, dysuria, nocturia, hesitancy, or incontinence  Musculoskeletal: see HPI  Neurologic: no paralysis, paresis, paresthesia, seizures, tremors, or headaches  Hematologic/Lymphatic/Immunologic: no anemia, abnormal bleeding/bruising, fever, chills, night sweats, swollen glands, or unexplained weight loss  Endocrine: no heat or cold intolerance and no polyphagia, polydipsia, or polyuria    PHYSICAL EXAMINATION:  /70   Pulse 64   Wt 158 lb (71.7 kg)   LMP  (Exact Date)   SpO2 99%   Breastfeeding? No   BMI 22.83 kg/m²   Constitutional: Oriented to person, place, and time. Appears well-developed and well-nourished. No distress. HENT:   Head: Normocephalic and atraumatic. Ears: Hearing, tympanic membrane, external ear and ear canal normal.   Nose: Nose normal.   Mouth/Throat: Uvula is midline, oropharynx is clear and moist and mucous membranes are normal.   Eyes: Conjunctivae and EOM are normal. Pupils are equal, round, and reactive to light. Neck: Normal range of motion. Neck supple. Normal carotid pulses and no JVD present. Carotid bruit is not present. No tracheal deviation present. No mass and no thyromegaly present. Cardiovascular: Normal rate, regular rhythm, S1 normal, S2 normal, normal heart sounds and intact distal pulses.     No murmur heard.  Pulmonary/Chest: Effort normal and breath sounds normal. No stridor. No respiratory distress. No decreased breath sounds. No wheezes. No rhonchi. No rales. Abdominal: Soft. Normal appearance and bowel sounds are normal. No distension, no abdominal bruit and no mass. There is no hepatomegaly. No tenderness. Musculoskeletal: Normal range of motion of all 4 extremities with no edema, or erythema. Non-tender  Lymphadenopathy:     No cervical adenopathy. No supraclavicular adenopathy. Neurological: Alert and oriented to person, place, and time. No tremor. She exhibits normal muscle tone. Coordination and gait normal.   Skin: Skin is warm and dry. No rash noted. Not diaphoretic. No cyanosis. No pallor. Nails show no clubbing. Psychiatric:  Normal mood and affect. Speech is normal and behavior is normal. Cognition and memory are normal.   Pelvic: Exam deferred to OB/GYN          ASSESSMENT:   Complete physical without pap. 1. Well adult exam    2. Current mild episode of major depressive disorder without prior episode ([address]    3. Chronic pain of both shoulders    4. Chronic pain of both knees    5. Postmenopausal osteoporosis    6. Screening for colon cancer    7. Encounter for screening for HIV    8. Need for hepatitis C screening test          PLAN:   1. Well adult exam   All health maintenance issues were updated. Shingrix unavailable today. Recommend continue current medications, continue current healthy lifestyle patterns and return for routine annual checkups  Reminded to update vision screening  -     CBC Auto Differential; Future  -     Comprehensive Metabolic Panel, Fasting; Future  -     Lipid, Fasting; Future  -     TSH WITH REFLEX TO FT4; Future    2. Current mild episode of major depressive disorder without prior episode (HCC)  Stable, no changes today  -     traZODone (DESYREL) 100 MG tablet;  Take 2 tablets by mouth nightly  -     buPROPion (WELLBUTRIN XL) 300 MG extended release tablet;

## 2019-04-19 LAB
HIV AG/AB: NORMAL
HIV ANTIGEN: NORMAL
HIV-1 ANTIBODY: NORMAL
HIV-2 AB: NORMAL

## 2019-05-06 ENCOUNTER — TELEPHONE (OUTPATIENT)
Dept: FAMILY MEDICINE CLINIC | Age: 59
End: 2019-05-06

## 2019-05-06 DIAGNOSIS — M25.511 CHRONIC PAIN OF BOTH SHOULDERS: ICD-10-CM

## 2019-05-06 DIAGNOSIS — M25.512 CHRONIC PAIN OF BOTH SHOULDERS: ICD-10-CM

## 2019-05-06 DIAGNOSIS — G89.29 CHRONIC PAIN OF BOTH SHOULDERS: ICD-10-CM

## 2019-05-06 RX ORDER — PREGABALIN 100 MG/1
100 CAPSULE ORAL DAILY
Qty: 30 CAPSULE | Refills: 1 | Status: SHIPPED | OUTPATIENT
Start: 2019-05-06 | End: 2019-06-25 | Stop reason: SDUPTHER

## 2019-05-06 NOTE — TELEPHONE ENCOUNTER
Pt would like to know if her dosage for pregabalin (LYRICA) 150 MG capsule    Can be decreased to maybe 50mg twice daily.   Please call back and advise

## 2019-06-21 DIAGNOSIS — M25.511 CHRONIC PAIN OF BOTH SHOULDERS: ICD-10-CM

## 2019-06-21 DIAGNOSIS — G89.29 CHRONIC PAIN OF BOTH SHOULDERS: ICD-10-CM

## 2019-06-21 DIAGNOSIS — M25.512 CHRONIC PAIN OF BOTH SHOULDERS: ICD-10-CM

## 2019-06-21 RX ORDER — PREGABALIN 100 MG/1
100 CAPSULE ORAL DAILY
Qty: 30 CAPSULE | Refills: 1 | Status: CANCELLED | OUTPATIENT
Start: 2019-06-21 | End: 2019-08-20

## 2019-06-25 ENCOUNTER — TELEPHONE (OUTPATIENT)
Dept: FAMILY MEDICINE CLINIC | Age: 59
End: 2019-06-25

## 2019-06-25 ENCOUNTER — OFFICE VISIT (OUTPATIENT)
Dept: FAMILY MEDICINE CLINIC | Age: 59
End: 2019-06-25
Payer: COMMERCIAL

## 2019-06-25 VITALS
BODY MASS INDEX: 23.58 KG/M2 | DIASTOLIC BLOOD PRESSURE: 82 MMHG | OXYGEN SATURATION: 100 % | WEIGHT: 163.2 LBS | HEART RATE: 57 BPM | SYSTOLIC BLOOD PRESSURE: 120 MMHG

## 2019-06-25 DIAGNOSIS — G89.29 CHRONIC PAIN OF BOTH SHOULDERS: Primary | ICD-10-CM

## 2019-06-25 DIAGNOSIS — M54.42 ACUTE LEFT-SIDED LOW BACK PAIN WITH LEFT-SIDED SCIATICA: ICD-10-CM

## 2019-06-25 DIAGNOSIS — M25.562 CHRONIC PAIN OF BOTH KNEES: ICD-10-CM

## 2019-06-25 DIAGNOSIS — E78.5 HYPERLIPIDEMIA, UNSPECIFIED HYPERLIPIDEMIA TYPE: ICD-10-CM

## 2019-06-25 DIAGNOSIS — M25.561 CHRONIC PAIN OF BOTH KNEES: ICD-10-CM

## 2019-06-25 DIAGNOSIS — M25.511 CHRONIC PAIN OF BOTH SHOULDERS: Primary | ICD-10-CM

## 2019-06-25 DIAGNOSIS — M25.512 CHRONIC PAIN OF BOTH SHOULDERS: Primary | ICD-10-CM

## 2019-06-25 DIAGNOSIS — G89.29 CHRONIC PAIN OF BOTH KNEES: ICD-10-CM

## 2019-06-25 PROCEDURE — 99214 OFFICE O/P EST MOD 30 MIN: CPT | Performed by: NURSE PRACTITIONER

## 2019-06-25 RX ORDER — PREGABALIN 100 MG/1
100 CAPSULE ORAL DAILY
Qty: 90 CAPSULE | Refills: 0 | Status: SHIPPED | OUTPATIENT
Start: 2019-06-25 | End: 2019-07-18 | Stop reason: ALTCHOICE

## 2019-06-25 RX ORDER — PREDNISONE 20 MG/1
TABLET ORAL
Qty: 20 TABLET | Refills: 0 | Status: SHIPPED | OUTPATIENT
Start: 2019-06-25 | End: 2019-06-26

## 2019-06-25 RX ORDER — DICLOFENAC SODIUM 75 MG/1
75 TABLET, DELAYED RELEASE ORAL 2 TIMES DAILY
Qty: 180 TABLET | Refills: 1 | Status: SHIPPED | OUTPATIENT
Start: 2019-06-25 | End: 2020-04-29

## 2019-06-25 ASSESSMENT — ENCOUNTER SYMPTOMS
BACK PAIN: 1
ABDOMINAL PAIN: 0

## 2019-06-25 NOTE — PATIENT INSTRUCTIONS

## 2019-06-25 NOTE — TELEPHONE ENCOUNTER
[facility] pharmacy is requesting alternative medication for Prednisone 20 MG Tab.   All Prednisone 20MG strengths out of stock

## 2019-06-25 NOTE — PROGRESS NOTES
SUBJECTIVE:  Pt is a of 63 y.o. female comes in today with   Chief Complaint   Patient presents with    Back Pain     pt states she would like referral for back and knee pain. She also would like to discuss lyrica       Patient presenting today for evaluation of chronic knee pain. States improving since we saw each other in April. States Diclofenac is helping some. Started with left low back pain 1 month ago. Started after moving suitcase. Pain radiates down leg to knee, aching pain in thigh. Tingling down leg to foot. No chronic back issues. She denies weakness, saddle anesthesia and new bowel or bladder dysfunction. Diclofenac helps some. Wanting to decrease Lyrica to 50 mg. Started several years ago for chronic shoulder pain. States pain has resolved and wanting to \"stop taking so much medication\". Hyperlipidemia:  Patient is  following a low fat, low cholesterol diet. She is not exercising regularly. OTC Supplements: none. No Rx medications      Lab Results   Component Value Date    TRIG 57 10/16/2015    HDL 77 04/18/2019    HDL 56 03/13/2012    LDLCALC 132 04/18/2019     Lab Results   Component Value Date    ALT 11 04/18/2019    AST 15 04/18/2019                Prior to Visit Medications    Medication Sig Taking? Authorizing Provider   pregabalin (LYRICA) 100 MG capsule Take 1 capsule by mouth daily for 60 days.  TAKE ONE CAPSULE BY MOUTH EVERY DAY , Yes LANCE Silveira CNP   traZODone (DESYREL) 100 MG tablet Take 2 tablets by mouth nightly Yes LANCE Silveira CNP   buPROPion (WELLBUTRIN XL) 300 MG extended release tablet Take 1 tablet by mouth every morning Yes LANCE Silveira CNP   diclofenac (VOLTAREN) 75 MG EC tablet Take 1 tablet by mouth 2 times daily Yes LANCE Silveira CNP   Omega-3 Fatty Acids (FISH OIL PO) Take by mouth Yes Historical Provider, MD   Cholecalciferol (VITAMIN D PO) Take by mouth Yes Historical Provider, MD   Esomeprazole Magnesium (NEXIUM PO) Take

## 2019-06-26 RX ORDER — PREDNISONE 10 MG/1
TABLET ORAL
Qty: 20 TABLET | Refills: 0 | Status: SHIPPED | OUTPATIENT
Start: 2019-06-26 | End: 2021-01-19

## 2019-06-28 ENCOUNTER — OFFICE VISIT (OUTPATIENT)
Dept: ORTHOPEDIC SURGERY | Age: 59
End: 2019-06-28
Payer: COMMERCIAL

## 2019-06-28 VITALS — BODY MASS INDEX: 23.36 KG/M2 | WEIGHT: 163.14 LBS | HEIGHT: 70 IN

## 2019-06-28 DIAGNOSIS — M54.50 LUMBAR PAIN: ICD-10-CM

## 2019-06-28 DIAGNOSIS — M51.36 LUMBAR DEGENERATIVE DISC DISEASE: ICD-10-CM

## 2019-06-28 DIAGNOSIS — M54.16 LUMBAR RADICULOPATHY: ICD-10-CM

## 2019-06-28 DIAGNOSIS — M51.26 HERNIATED NUCLEUS PULPOSUS, LUMBAR: Primary | ICD-10-CM

## 2019-06-28 PROCEDURE — 99243 OFF/OP CNSLTJ NEW/EST LOW 30: CPT | Performed by: PHYSICAL MEDICINE & REHABILITATION

## 2019-06-28 NOTE — PROGRESS NOTES
New Patient: SPINE    Referring Provider:  LANCE Walter *    CHIEF COMPLAINT:    Chief Complaint   Patient presents with    New Patient     LSP       HISTORY OF PRESENT ILLNESS:      The patient is being sent at the request of LANCE Walter in consultation as a new spine patient for low back pain and left hip pain. The patient is a 63 y.o. female whom reports symptoms for 5 years. Symptoms progressed over the last  4 weeks. Patient reports there was not a significant event to cause the symptoms. Today discomfort is report at 5 out of 10, describing it as stabbing. Symptoms are aggravated by: sitting, bending, twisting. Patient has jnot undergone recent treatment. Patient denies previous spine surgery. The patient is present today as a new patient for her LSP. She was referred by Dr. Karen Jiménez. She says she has had ongoing back pain for many years, but it progressively had started to get worse at the beginning of May. She thinks it might have been when she moved a suitcase when she came back from vacation. She rates her pain today a 5/10 and describes it as a stabbing pain. She says she also hurt her back in a yoga class. The patient reports no new injuries or issues at this time. Pain Assessment  Location of Pain: Back  Location Modifiers: Inferior, Posterior, Medial  Severity of Pain: 5  Quality of Pain: Other (Comment)(stabbing)  Duration of Pain: Persistent  Frequency of Pain: Constant  Aggravating Factors: Other (Comment), Bending(sitting, twisting)  Relieving Factors:  Other (Comment)(diclofenac)  Result of Injury: No  Work-Related Injury: No  Are there other pain locations you wish to document?: No       Associated signs and symptoms:   Neurogenic bowel or bladder symptoms:  no   Perceived weakness:  no   Difficulty walking:  yes     Recent Imaging (within past one year)   Xrays: no   MRI or CT of spine: no    Current/Past Treatment:   · Physical Therapy:  none  · Chiropractic: none  · Injection:  none  · Medications:   NSAIDS:  yes, acetaminophin   Muscle relaxer:  none   Steriods:  yes, prednisone   Neuropathic medications:  none   Opioids:  none  · Previous surgery:  no  · Previous surgical consult:  no  · Other:  · Infection control  · Tested positive for MRSA in past 12 months:  no  · Tested positive for MSSA \"staph infection\" in past 12 months: no  · Tested positive for VRE (Vancomycin Resistant Enterococci) in past 12 months:   no  · Currently on any antibiotics for an infection: no  · Anticoagulants:  · On a blood thinner:  no   · Any history of bleeding disorder: no   · MRI Contraindication: no   · Previous Pain Management: no                   Past Medical History:   Past Medical History:   Diagnosis Date    Alcoholism ([address]     Recovering    Degenerative joint disease     Depression     Fibrocystic disease of breast 6/15/2010    GERD (gastroesophageal reflux disease)     Headache(784.0)     Hemorrhoid 6/15/2010    Hyperlipidemia 6/25/2019    Mitral valve regurgitation     Osteoporosis     Screening mammogram, encounter for 02/04/2018    Dr Garland Burnett MD    Vitamin D deficiency 3/16/2012      Past Surgical History:     Past Surgical History:   Procedure Laterality Date    BLADDER SURGERY      with hysterectomy-vaginal approach    BREAST SURGERY  2006    benign cyst on left    COLONOSCOPY  11/30/2012    Dr Hugo Haider      facial 12.11    FACIAL COSMETIC SURGERY      HYSTERECTOMY  1996    SALPINGO-OOPHORECTOMY      still with left ovary     Current Medications:     Current Outpatient Medications:     predniSONE (DELTASONE) 10 MG tablet, 4 po daily for 3 days, 3 for 3 days, 2 for 3 days, 1 for 3 days, then stop, Disp: 20 tablet, Rfl: 0    diclofenac (VOLTAREN) 75 MG EC tablet, Take 1 tablet by mouth 2 times daily, Disp: 180 tablet, Rfl: 1    pregabalin (LYRICA) 100 MG capsule, Take 1 capsule by mouth daily for 90 days.  TAKE ONE CAPSULE BY MOUTH EVERY DAY ,, Disp: 90 capsule, Rfl: 0    traZODone (DESYREL) 100 MG tablet, Take 2 tablets by mouth nightly, Disp: 180 tablet, Rfl: 3    buPROPion (WELLBUTRIN XL) 300 MG extended release tablet, Take 1 tablet by mouth every morning, Disp: 90 tablet, Rfl: 3    Omega-3 Fatty Acids (FISH OIL PO), Take by mouth, Disp: , Rfl:     Cholecalciferol (VITAMIN D PO), Take by mouth, Disp: , Rfl:     Esomeprazole Magnesium (NEXIUM PO), Take by mouth, Disp: , Rfl:   Allergies:  Patient has no known allergies. Social History:    reports that she has never smoked. She has never used smokeless tobacco. She reports that she does not drink alcohol or use drugs. Family History:   Family History   Problem Relation Age of Onset    High Blood Pressure Mother     High Cholesterol Mother     High Blood Pressure Father     High Cholesterol Father     Arthritis Father     Arthritis Paternal Grandmother     Rheum Arthritis Neg Hx     Osteoarthritis Neg Hx     Asthma Neg Hx     Breast Cancer Neg Hx     Cancer Neg Hx     Diabetes Neg Hx     Heart Failure Neg Hx     Hypertension Neg Hx     Migraines Neg Hx     Ovarian Cancer Neg Hx     Rashes/Skin Problems Neg Hx     Seizures Neg Hx     Stroke Neg Hx     Thyroid Disease Neg Hx          REVIEW OF SYSTEMS: Full ROS reviewed & scanned from 6/28/2019           PHYSICAL EXAM:    Vitals: Height 5' 9.76\" (1.772 m), weight 163 lb 2.3 oz (74 kg), not currently breastfeeding. HR 70    GENERAL EXAM:  · General Apparence: Patient is adequately groomed with no evidence of malnutrition. · Psychiatric: Orientation: The patient is oriented to time, place and person. The patient's mood and affect are appropriate   · Vascular: Examination reveals no swelling and palpation reveals no tenderness in upper or lower extremities. Good capillary refill.    · The lymphatic examination of the neck, axillae and groin reveals all areas to be without enlargement or induration   Sensation is intact without deficit in the upper and lower extremities to light touch and pinprick  · Coordination of the upper and lower extremities are normal.    CERVICAL EXAMINATION:  · Inspection: Local inspection shows no step-off or bruising. Cervical alignment is normal. No instability is noted. · Palpation and Percussion: No evidence of tenderness at the midline. Paraspinal tenderness is not present. There is no paraspinal spasm. · Range of Motion:  limited by 25% in all planes due to pain   · Strength: 5/5 bilateral upper extremities  · Special Tests:   Spurling's and Katz's are negative bilaterally. Mohr and Impingement tests are negative bilaterally. · Skin:There are no rashes, ulcerations or lesions. · Reflexes: Bilaterally triceps, biceps and brachioradialis are 2+. Clonus absent bilaterally at the feet. No pathological reflexes are noted. · Gait & station: normal, patient ambulates without assistance  · Additional Examinations:  · RIGHT UPPER EXTREMITY:  Inspection/examination of the right upper extremity does not show any tenderness, deformity or injury. Range of motion is normal and pain-free. There is no gross instability. There are no rashes, ulcerations or lesions. Strength and tone are normal. No atrophy or abnormal movements are noted. · LEFT UPPER EXTREMITY: Inspection/examination of the left upper extremity does not show any tenderness, deformity or injury. Range of motion is normal and pain-free. There is no gross instability. There are no rashes, ulcerations or lesions. Strength and tone are normal. No atrophy or abnormal movements are noted. LUMBAR/SACRAL EXAMINATION:  · Inspection: Local inspection shows no step-off or bruising. Lumbar alignment is normal. No instability is noted. · Palpation:   No evidence of tenderness at the midline.  Lumbar paraspinal tenderness Mild L4/5 and L5/S1 tenderness  Bursal tenderness No tenderness bilaterally  There is no paraspinal spasm. · Range of Motion: limited by 25% in all planes due to pain  · Strength:   Strength testing is 5/5 in all muscle groups tested. · Special Tests:   Straight leg raise + on left and crossed SLR negative  · Skin: There are no rashes, ulcerations or lesions. · Reflexes: Reflexes are symmetrically 2+ at the patellar and ankle tendons. Clonus absent bilaterally at the feet. · Gait & station: normal, patient ambulates without assistance, no ataxia  · Additional Examinations:  · RIGHT LOWER EXTREMITY: Inspection/examination of the right lower extremity does not show any tenderness, deformity or injury. Range of motion is unremarkable. There is no gross instability. There are no rashes, ulcerations or lesions. Strength and tone are normal. No atrophy or abnormal movements are noted. · LEFT LOWER EXTREMITY:  Inspection/examination of the left lower extremity does not show any tenderness, deformity or injury. Range of motion is unremarkable. There is no gross instability. There are no rashes, ulcerations or lesions. Strength and tone are normal. No atrophy or abnormal movements are noted.       Diagnostic Testing:    Xrays:   AP and lateral of the lumbar spine taken today in the office show levoscoliosis with L3-4 L4-5 moderate degenerative disc disease and facet arthropathy lower lumbar spine  MRI or CT:  None  EMG:  None  Results for orders placed or performed in visit on 04/18/19   Vitamin D 25 Hydroxy   Result Value Ref Range    Vit D, 25-Hydroxy 30.6 >=30 ng/mL   Hepatitis C Antibody   Result Value Ref Range    Hep C Ab Interp Non-reactive Non-reactive   TSH WITH REFLEX TO FT4   Result Value Ref Range    TSH Reflex FT4 2.45 0.27 - 4.20 uIU/mL   Lipid, Fasting   Result Value Ref Range    Cholesterol, Fasting 219 (H) 0 - 199 mg/dL    Triglyceride, Fasting 49 0 - 150 mg/dL    HDL 77 (H) 40 - 60 mg/dL    LDL Calculated 132 (H) <100 mg/dL    VLDL Cholesterol Calculated 10 Not Established mg/dL   HIV Screen   Result Value Ref Range    HIV Ag/Ab Non-Reactive Non-reactive    HIV-1 Antibody Non-Reactive Non-reactive    HIV ANTIGEN Non-Reactive Non-reactive    HIV-2 Ab Non-Reactive Non-reactive   Comprehensive Metabolic Panel, Fasting   Result Value Ref Range    Sodium 138 136 - 145 mmol/L    Potassium 5.1 3.5 - 5.1 mmol/L    Chloride 101 99 - 110 mmol/L    CO2 27 21 - 32 mmol/L    Anion Gap 10 3 - 16    Glucose, Fasting 100 (H) 70 - 99 mg/dL    BUN 11 7 - 20 mg/dL    CREATININE 1.0 0.6 - 1.1 mg/dL    GFR Non- 57 (A) >60    GFR African American >60 >60    Calcium 9.8 8.3 - 10.6 mg/dL    Total Protein 6.9 6.4 - 8.2 g/dL    Alb 4.5 3.4 - 5.0 g/dL    Albumin/Globulin Ratio 1.9 1.1 - 2.2    Total Bilirubin 0.5 0.0 - 1.0 mg/dL    Alkaline Phosphatase 65 40 - 129 U/L    ALT 11 10 - 40 U/L    AST 15 15 - 37 U/L    Globulin 2.4 g/dL   CBC Auto Differential   Result Value Ref Range    WBC 4.6 4.0 - 11.0 K/uL    RBC 4.48 4.00 - 5.20 M/uL    Hemoglobin 13.3 12.0 - 16.0 g/dL    Hematocrit 39.5 36.0 - 48.0 %    MCV 88.2 80.0 - 100.0 fL    MCH 29.7 26.0 - 34.0 pg    MCHC 33.7 31.0 - 36.0 g/dL    RDW 12.8 12.4 - 15.4 %    Platelets 871 132 - 277 K/uL    MPV 8.7 5.0 - 10.5 fL    Neutrophils % 58.0 %    Lymphocytes % 26.8 %    Monocytes % 10.3 %    Eosinophils % 4.0 %    Basophils % 0.9 %    Neutrophils # 2.6 1.7 - 7.7 K/uL    Lymphocytes # 1.2 1.0 - 5.1 K/uL    Monocytes # 0.5 0.0 - 1.3 K/uL    Eosinophils # 0.2 0.0 - 0.6 K/uL    Basophils # 0.0 0.0 - 0.2 K/uL       Impression (Medical Decision Making):       1. Herniated nucleus pulposus, lumbar    2. Lumbar radiculopathy    3. Lumbar degenerative disc disease    4. Lumbar pain        Plan (Medical Decision Making):    I discussed the diagnosis and the treatment options with Maya Pendleton today.      In Summary:  The various treatment options were outlined and discussed with Maya Pendleton including:  Conservative care options: physical therapy, ice, medications, bracing, and activity modification. The indications for therapeutic injections. The indications for additional imaging/laboratory studies. The indications for (possible future) interventions. After considering the various options discussed, Dianne Barbosa elected to pursue a course of treatment that includes the followin. Medications: Continue anti-inflammatories with appropriate GI Precautions including to stop if develop dark tarry stools or GI upset and to take with food. 2. PT:  I will start the patient on a trial of PT to work on a lumbar stabilization program to focus on core strengthening, core stabilizing, lumbar stretches, hamstring flexibility, modalities as indicated for 6-8 visits over the next 4-6 weeks. 3. Further studies:  None at this time    4. Interventional:  At this point, no interventional options are recommended. 5. Healthy Lifestyle Measures:  Patient education material reviewing the following was distributed to [employer]  Healthy lifestyle education  Osteoporosis prevention,   Back and neck pain educational information   Advanced imaging preparedness    Posture education   Proper lifting and carrying techniques,   Weight management  Quitting smoking and   Minor ways to treat back pain  For further information regarding the spine conditions and to review interventional treatments the patient was directed to [employer].    6.  Follow up:  4-6 weeks    Dianne Barbosa was instructed to call the office if her symptoms worsen or if new symptoms appear prior to the next scheduled visit. She is specifically instructed to contact the office between now & her scheduled appointment if she has concerns related to her condition or if she needs assistance in scheduling the above tests. She is welcome to call for an appointment sooner if she has any additional concerns or questions.      Kinsey SILVERIO CCMA am scribing for and in the presence of  Bindu Martinez. The physical examination was performed between the patient and Dr. Bindu Martinez. All counseling during the appointment was performed between the patient and Dr. Bindu Martinez    06/28/19 9:02 AM  Pam Anne, Dr. Crista Felix. Jean Marie, personally performed the services described in this documentation as scribed by GUANACO Raphael in my presence and it is both accurate and complete. Jan Marquez. Nas Fischer MD, DYAN, [facility]  Board Certified in [address] [place] [address] Certified and Fellowship Trained in [facility] ([facility])     This dictation was performed with a verbal recognition program [facility]) and it was checked for errors. It is possible that there are still dictated errors within this office note. If so, please bring any errors to my attention for an addendum. All efforts were made to ensure that this office note is accurate.

## 2019-07-08 ENCOUNTER — HOSPITAL ENCOUNTER (OUTPATIENT)
Dept: PHYSICAL THERAPY | Age: 59
Setting detail: THERAPIES SERIES
Discharge: HOME | End: 2019-07-08
Payer: COMMERCIAL

## 2019-07-08 PROCEDURE — 97110 THERAPEUTIC EXERCISES: CPT | Performed by: PHYSICAL THERAPIST

## 2019-07-08 PROCEDURE — 97161 PT EVAL LOW COMPLEX 20 MIN: CPT | Performed by: PHYSICAL THERAPIST

## 2019-07-08 PROCEDURE — 97140 MANUAL THERAPY 1/> REGIONS: CPT | Performed by: PHYSICAL THERAPIST

## 2019-07-08 NOTE — PLAN OF CARE
HEP.    HEP instruction: Patient instructed in, and demonstrated proper form of, exercises. Copy of exercises scanned into media file    GOALS:  Patient stated goal: Wants to be able to decrease her pain and increase activity level without c/o increased low back pain    Therapist goals for Patient:   Short Term Goals: To be achieved in: 2 weeks  1. Independent in HEP and progression per patient tolerance, in order to prevent re-injury. 2. Patient will have a decrease in pain to facilitate improvement in movement, function, and ADLs as indicated by Functional Deficits. Long Term Goals: To be achieved in: 4 weeks  1. Disability index score of 14% or less for the KARLA to assist with reaching prior level of function. 2. Patient will demonstrate increased AROM to WNL, good LS mobility, good hip ROM to allow for proper joint functioning as indicated by patients Functional Deficits. 3. Patient will demonstrate an increase in Strength to good proximal hip and core activation to allow for proper functional mobility as indicated by patients Functional Deficits. 4. Patient will return to lifting 5# weight from floor to waist level without increased symptoms or restriction.       Electronically signed by:  Glennis Severance, PT

## 2019-07-11 ENCOUNTER — HOSPITAL ENCOUNTER (OUTPATIENT)
Dept: PHYSICAL THERAPY | Age: 59
Setting detail: THERAPIES SERIES
Discharge: HOME | End: 2019-07-11
Payer: COMMERCIAL

## 2019-07-11 NOTE — FLOWSHEET NOTE
[employer]    Physical Therapy  Cancellation/No-show Note  Patient Name:  Connie Solo  :  1960   Date:  2019  Cancelled visits to date: 1  No-shows to date: 0    For today's appointment patient:  [x]  Cancelled  []  Rescheduled appointment  []  No-show     Reason given by patient:  []  Patient ill  []  Conflicting appointment  []  No transportation    []  Conflict with work  [x]  No reason given  []  Other:     Comments:      Electronically signed by:  Antonia Stanley PT

## 2019-07-23 ENCOUNTER — HOSPITAL ENCOUNTER (OUTPATIENT)
Dept: PHYSICAL THERAPY | Age: 59
Setting detail: THERAPIES SERIES
Discharge: HOME | End: 2019-07-23
Payer: COMMERCIAL

## 2019-07-23 PROCEDURE — 97140 MANUAL THERAPY 1/> REGIONS: CPT | Performed by: PHYSICAL THERAPIST

## 2019-07-23 PROCEDURE — 97110 THERAPEUTIC EXERCISES: CPT | Performed by: PHYSICAL THERAPIST

## 2019-07-23 NOTE — FLOWSHEET NOTE
Minutes: 45     [] EVAL  [x] (03851) x  2   [] IONTO  [] NMR (40188) x      [] VASO  [x] Manual (84951) x  1    [] Other:  [] TA x       [] Mech Traction (79796)  [] ES(attended) (24539)      [] ES (un) (27315):     GOALS:  Patient stated goal: Wants to be able to decrease her pain and increase activity level without c/o increased low back pain     Therapist goals for Patient:   Short Term Goals: To be achieved in: 2 weeks  1. Independent in HEP and progression per patient tolerance, in order to prevent re-injury. 2. Patient will have a decrease in pain to facilitate improvement in movement, function, and ADLs as indicated by Functional Deficits.     Long Term Goals: To be achieved in: 4 weeks  1. Disability index score of 14% or less for the KARLA to assist with reaching prior level of function. 2. Patient will demonstrate increased AROM to WNL, good LS mobility, good hip ROM to allow for proper joint functioning as indicated by patients Functional Deficits. 3. Patient will demonstrate an increase in Strength to good proximal hip and core activation to allow for proper functional mobility as indicated by patients Functional Deficits. 4. Patient will return to lifting 5# weight from floor to waist level without increased symptoms or restriction. Progression Towards Functional goals:  [] Patient is progressing as expected towards functional goals listed. [] Progression is slowed due to complexities listed. [] Progression has been slowed due to co-morbidities.   [x] Plan just implemented, too soon to assess goals progression  [] Other:     ASSESSMENT:  See eval    Treatment/Activity Tolerance:  [] Patient tolerated treatment well [] Patient limited by fatique  [] Patient limited by pain  [] Patient limited by other medical complications  [] Other:     Prognosis: [] Good [] Fair  [] Poor    Patient Requires Follow-up: [x] Yes  [] No    PLAN: See eval. Focus hard on lumbar stabilization  [] Continue per plan of care [] Alter current plan (see comments)  [x] Plan of care initiated [] Hold pending MD visit [] Discharge    Electronically signed by: Ana Rojas PT

## 2019-09-02 DIAGNOSIS — F32.0 CURRENT MILD EPISODE OF MAJOR DEPRESSIVE DISORDER WITHOUT PRIOR EPISODE (HCC): ICD-10-CM

## 2019-09-03 RX ORDER — BUPROPION HYDROCHLORIDE 300 MG/1
TABLET ORAL
Qty: 90 TABLET | Refills: 3 | Status: SHIPPED | OUTPATIENT
Start: 2019-09-03 | End: 2020-10-12

## 2019-12-02 ENCOUNTER — OFFICE VISIT (OUTPATIENT)
Dept: GYNECOLOGY | Age: 59
End: 2019-12-02
Payer: COMMERCIAL

## 2019-12-02 VITALS
WEIGHT: 162 LBS | RESPIRATION RATE: 17 BRPM | SYSTOLIC BLOOD PRESSURE: 122 MMHG | HEIGHT: 70 IN | DIASTOLIC BLOOD PRESSURE: 74 MMHG | BODY MASS INDEX: 23.19 KG/M2 | HEART RATE: 70 BPM

## 2019-12-02 DIAGNOSIS — N39.41 URGE INCONTINENCE OF URINE: ICD-10-CM

## 2019-12-02 DIAGNOSIS — Z01.419 WELL WOMAN EXAM WITH ROUTINE GYNECOLOGICAL EXAM: Primary | ICD-10-CM

## 2019-12-02 PROCEDURE — 99396 PREV VISIT EST AGE 40-64: CPT | Performed by: OBSTETRICS & GYNECOLOGY

## 2019-12-02 RX ORDER — ESTRADIOL 10 UG/1
10 INSERT VAGINAL
Qty: 36 TABLET | Refills: 3 | Status: SHIPPED | OUTPATIENT
Start: 2019-12-02 | End: 2020-12-07 | Stop reason: SDUPTHER

## 2019-12-02 RX ORDER — PREGABALIN 100 MG/1
CAPSULE ORAL
Refills: 0 | COMMUNITY
Start: 2019-10-17 | End: 2020-01-18

## 2019-12-02 RX ORDER — CYCLOBENZAPRINE HCL 5 MG
5 TABLET ORAL NIGHTLY PRN
Qty: 30 TABLET | Refills: 2 | Status: SHIPPED | OUTPATIENT
Start: 2019-12-02 | End: 2020-05-08

## 2019-12-08 ASSESSMENT — ENCOUNTER SYMPTOMS
EYES NEGATIVE: 1
RESPIRATORY NEGATIVE: 1
GASTROINTESTINAL NEGATIVE: 1

## 2019-12-30 ENCOUNTER — TELEPHONE (OUTPATIENT)
Dept: GYNECOLOGY | Age: 59
End: 2019-12-30

## 2019-12-30 DIAGNOSIS — R30.0 DYSURIA: Primary | ICD-10-CM

## 2019-12-30 DIAGNOSIS — N94.9 VAGINAL BURNING: Primary | ICD-10-CM

## 2019-12-30 DIAGNOSIS — R30.0 DYSURIA: ICD-10-CM

## 2019-12-30 RX ORDER — FLUCONAZOLE 150 MG/1
150 TABLET ORAL ONCE
Qty: 1 TABLET | Refills: 1 | Status: SHIPPED | OUTPATIENT
Start: 2019-12-30 | End: 2019-12-30

## 2019-12-30 RX ORDER — NYSTATIN 100000 [USP'U]/G
POWDER TOPICAL 2 TIMES DAILY
Qty: 1 BOTTLE | Refills: 2 | Status: SHIPPED | OUTPATIENT
Start: 2019-12-30 | End: 2020-01-06

## 2019-12-30 NOTE — TELEPHONE ENCOUNTER
Spoke with patient. I put in order for UC , diflucan and nystatin powder. Patient rather try to treat over the phone due to work.  I did offer an appointment but patient wanted to try this first.

## 2020-01-01 NOTE — TELEPHONE ENCOUNTER
[facility] called re:, Prednisone 10mg  The quantity is #20 and when the pharmacy figured out dosing it should be #30  Contact alex  Ph. [phone]
That is fine
done

## 2020-01-02 DIAGNOSIS — R30.0 DYSURIA: ICD-10-CM

## 2020-01-04 LAB — URINE CULTURE, ROUTINE: NORMAL

## 2020-01-06 RX ORDER — NITROFURANTOIN 25; 75 MG/1; MG/1
100 CAPSULE ORAL 2 TIMES DAILY
Qty: 14 CAPSULE | Refills: 0 | OUTPATIENT
Start: 2020-01-06 | End: 2020-01-13

## 2020-03-16 ENCOUNTER — OFFICE VISIT (OUTPATIENT)
Dept: FAMILY MEDICINE CLINIC | Age: 60
End: 2020-03-16
Payer: COMMERCIAL

## 2020-03-16 VITALS
SYSTOLIC BLOOD PRESSURE: 118 MMHG | HEART RATE: 69 BPM | OXYGEN SATURATION: 98 % | WEIGHT: 155 LBS | DIASTOLIC BLOOD PRESSURE: 80 MMHG | BODY MASS INDEX: 22.24 KG/M2 | TEMPERATURE: 97.2 F

## 2020-03-16 PROCEDURE — 99213 OFFICE O/P EST LOW 20 MIN: CPT | Performed by: NURSE PRACTITIONER

## 2020-03-16 ASSESSMENT — ENCOUNTER SYMPTOMS
SHORTNESS OF BREATH: 0
CHEST TIGHTNESS: 1
RHINORRHEA: 1
WHEEZING: 0
SORE THROAT: 1
COUGH: 1

## 2020-03-16 NOTE — PROGRESS NOTES
SUBJECTIVE:  Pt is a of 63 y.o. female comes in today with   Chief Complaint   Patient presents with    Cough     scratch throat, headache started on Wednesday          No recent travel      Pharyngitis   This is a new problem. The current episode started in the past 7 days (5 days ago). The problem occurs constantly. The problem has been unchanged. Associated symptoms include congestion, coughing (mild), headaches and a sore throat. Pertinent negatives include no chills, fatigue or fever. She has tried acetaminophen for the symptoms. The treatment provided mild relief. Prior to Visit Medications    Medication Sig Taking? Authorizing Provider   pregabalin (LYRICA) 100 MG capsule Take 1 capsule by mouth daily for 180 days. Yes LANCE South CNP   Estradiol (YUVAFEM) 10 MCG TABS vaginal tablet Place 1 tablet vaginally three times a week Yes Chris Lara MD   buPROPion (WELLBUTRIN XL) 300 MG extended release tablet TAKE 1 TABLET BY MOUTH EVERY DAY IN THE MORNING Yes LANCE South CNP   predniSONE (DELTASONE) 10 MG tablet 4 po daily for 3 days, 3 for 3 days, 2 for 3 days, 1 for 3 days, then stop Yes LANCE South CNP   traZODone (DESYREL) 100 MG tablet Take 2 tablets by mouth nightly Yes LANCE South CNP   Omega-3 Fatty Acids (FISH OIL PO) Take by mouth Yes Historical Provider, MD   Cholecalciferol (VITAMIN D PO) Take by mouth Yes Historical Provider, MD   Esomeprazole Magnesium (NEXIUM PO) Take by mouth Yes Historical Provider, MD   pregabalin (LYRICA) 100 MG capsule Take 1 capsule by mouth daily for 90 days. LANCE South CNP   diclofenac (VOLTAREN) 75 MG EC tablet Take 1 tablet by mouth 2 times daily  LANCE South CNP       Patient's past medical, surgical, social and family histories were reviewed and updated as appropriate. Review of Systems   Constitutional: Negative for chills, fatigue and fever.    HENT: Positive for congestion, postnasal drip, rhinorrhea and sore throat. Negative for ear pain. Respiratory: Positive for cough (mild) and chest tightness. Negative for shortness of breath and wheezing. Neurological: Positive for headaches. Physical Exam  Vitals signs reviewed. Constitutional:       Appearance: She is well-developed. HENT:      Right Ear: Tympanic membrane normal.      Left Ear: Tympanic membrane normal.      Nose: Nose normal.      Mouth/Throat:      Pharynx: Uvula midline. No oropharyngeal exudate or posterior oropharyngeal erythema. Cardiovascular:      Rate and Rhythm: Normal rate and regular rhythm. Heart sounds: Normal heart sounds. Pulmonary:      Effort: Pulmonary effort is normal.      Breath sounds: Normal breath sounds. Lymphadenopathy:      Cervical: No cervical adenopathy. Skin:     General: Skin is warm and dry. Neurological:      Mental Status: She is alert and oriented to person, place, and time. /80   Pulse 69   Temp 97.2 °F (36.2 °C) (Tympanic)   Wt 155 lb (70.3 kg)   LMP  (Exact Date)   SpO2 98%   BMI 22.24 kg/m²       Assessment/Plan    1. Nasopharyngitis  Low suspicion for Coronovirus- no recent travel, no known exposure   Symtomatic treatment: Tylenol / Advil, rest, salt water gargles, increase fluids. May also use:Sudafed, Robitussin DM or Delsym. Can make appointment of symptoms worsen or fail to improve over the next 3-4 days. Most viral illnesses last 7-10 days, and antibiotics do not help. See pt instructions  Work note given  Discussed use, benefit, and side effects of prescribed medications. All patient questions answered. Pt voiced understanding.

## 2020-03-16 NOTE — LETTER
[address]  Phone: [phone]  Fax: [phone]    LANCE Flowers CNP        March 16, 2020     Patient: Kimberly Hall   YOB: 1960   Date of Visit: 3/16/2020       To Whom it May Concern:    Luis A York was seen in my office on 3/16/2020. She may return to work on 3/23/20. DUe to current symptoms I recommend she remain home until 3/23/20. .    If you have any questions or concerns, please don't hesitate to call.     Sincerely,         LANCE Flowers CNP

## 2020-04-15 RX ORDER — TRAZODONE HYDROCHLORIDE 100 MG/1
TABLET ORAL
Qty: 180 TABLET | Refills: 2 | Status: SHIPPED | OUTPATIENT
Start: 2020-04-15 | End: 2021-01-11

## 2020-04-29 RX ORDER — DICLOFENAC SODIUM 75 MG/1
TABLET, DELAYED RELEASE ORAL
Qty: 180 TABLET | Refills: 1 | Status: SHIPPED | OUTPATIENT
Start: 2020-04-29 | End: 2021-01-11

## 2020-05-04 ENCOUNTER — TELEPHONE (OUTPATIENT)
Dept: FAMILY MEDICINE CLINIC | Age: 60
End: 2020-05-04

## 2020-05-08 RX ORDER — CYCLOBENZAPRINE HCL 5 MG
5 TABLET ORAL NIGHTLY PRN
Qty: 30 TABLET | Refills: 2 | Status: SHIPPED | OUTPATIENT
Start: 2020-05-08 | End: 2020-08-18

## 2020-05-21 ENCOUNTER — TELEPHONE (OUTPATIENT)
Dept: FAMILY MEDICINE CLINIC | Age: 60
End: 2020-05-21

## 2020-05-26 ENCOUNTER — OFFICE VISIT (OUTPATIENT)
Dept: FAMILY MEDICINE CLINIC | Age: 60
End: 2020-05-26
Payer: COMMERCIAL

## 2020-05-26 VITALS
DIASTOLIC BLOOD PRESSURE: 70 MMHG | BODY MASS INDEX: 22.62 KG/M2 | TEMPERATURE: 98 F | WEIGHT: 158 LBS | HEART RATE: 74 BPM | HEIGHT: 70 IN | SYSTOLIC BLOOD PRESSURE: 122 MMHG | OXYGEN SATURATION: 96 %

## 2020-05-26 PROBLEM — G89.29 CHRONIC LEFT-SIDED LOW BACK PAIN WITHOUT SCIATICA: Status: ACTIVE | Noted: 2020-05-26

## 2020-05-26 PROBLEM — M54.50 CHRONIC LEFT-SIDED LOW BACK PAIN WITHOUT SCIATICA: Status: ACTIVE | Noted: 2020-05-26

## 2020-05-26 PROBLEM — M25.511 CHRONIC PAIN OF BOTH SHOULDERS: Status: ACTIVE | Noted: 2020-05-26

## 2020-05-26 PROBLEM — G89.29 CHRONIC PAIN OF BOTH SHOULDERS: Status: ACTIVE | Noted: 2020-05-26

## 2020-05-26 PROBLEM — M25.512 CHRONIC PAIN OF BOTH SHOULDERS: Status: ACTIVE | Noted: 2020-05-26

## 2020-05-26 PROCEDURE — 99214 OFFICE O/P EST MOD 30 MIN: CPT | Performed by: NURSE PRACTITIONER

## 2020-05-26 RX ORDER — ESOMEPRAZOLE MAGNESIUM 40 MG/1
40 CAPSULE, DELAYED RELEASE ORAL
Qty: 90 CAPSULE | Refills: 1 | Status: SHIPPED | OUTPATIENT
Start: 2020-05-26 | End: 2022-05-26 | Stop reason: ALTCHOICE

## 2020-05-26 ASSESSMENT — ENCOUNTER SYMPTOMS
SHORTNESS OF BREATH: 0
ABDOMINAL PAIN: 0
NAUSEA: 0
BACK PAIN: 0
VOMITING: 0
CONSTIPATION: 0
DIARRHEA: 0

## 2020-07-15 RX ORDER — PREGABALIN 100 MG/1
100 CAPSULE ORAL DAILY
Qty: 90 CAPSULE | Refills: 1 | Status: SHIPPED | OUTPATIENT
Start: 2020-07-15 | End: 2020-10-05 | Stop reason: SDUPTHER

## 2020-08-18 RX ORDER — CYCLOBENZAPRINE HCL 5 MG
5 TABLET ORAL NIGHTLY PRN
Qty: 30 TABLET | Refills: 2 | Status: SHIPPED | OUTPATIENT
Start: 2020-08-18 | End: 2020-09-17

## 2020-10-05 ENCOUNTER — TELEPHONE (OUTPATIENT)
Dept: FAMILY MEDICINE CLINIC | Age: 60
End: 2020-10-05

## 2020-10-05 RX ORDER — PREGABALIN 200 MG/1
200 CAPSULE ORAL DAILY
Qty: 90 CAPSULE | Refills: 1 | Status: SHIPPED | OUTPATIENT
Start: 2020-10-05 | End: 2021-01-19

## 2020-10-05 NOTE — TELEPHONE ENCOUNTER
----- Message from Rusty Vasques sent at 10/5/2020 11:05 AM EDT -----  Subject: Message to Provider    QUESTIONS  Information for Provider? Patient is wanting to increase her dosage of   lyrica back to 200mg as she is experiencing too many body aches.   ---------------------------------------------------------------------------  --------------  CALL BACK INFO  What is the best way for the office to contact you? OK to leave message on   voicemail   OK to respond with secure message via [employer] portal (only for patients   who have registered [employer] account)  Preferred Call Back Phone Number? [phone]  ---------------------------------------------------------------------------  --------------  SCRIPT ANSWERS  Relationship to Patient?  Self

## 2020-10-12 RX ORDER — BUPROPION HYDROCHLORIDE 300 MG/1
TABLET ORAL
Qty: 90 TABLET | Refills: 1 | Status: SHIPPED | OUTPATIENT
Start: 2020-10-12 | End: 2021-01-19 | Stop reason: SDUPTHER

## 2020-10-20 ENCOUNTER — OFFICE VISIT (OUTPATIENT)
Dept: ORTHOPEDIC SURGERY | Age: 60
End: 2020-10-20
Payer: COMMERCIAL

## 2020-10-20 VITALS — WEIGHT: 155 LBS | HEIGHT: 70 IN | BODY MASS INDEX: 22.19 KG/M2

## 2020-10-20 PROCEDURE — 20610 DRAIN/INJ JOINT/BURSA W/O US: CPT | Performed by: ORTHOPAEDIC SURGERY

## 2020-10-20 PROCEDURE — 99203 OFFICE O/P NEW LOW 30 MIN: CPT | Performed by: ORTHOPAEDIC SURGERY

## 2020-10-20 RX ORDER — METHYLPREDNISOLONE ACETATE 40 MG/ML
80 INJECTION, SUSPENSION INTRA-ARTICULAR; INTRALESIONAL; INTRAMUSCULAR; SOFT TISSUE ONCE
Status: COMPLETED | OUTPATIENT
Start: 2020-10-20 | End: 2020-10-20

## 2020-10-20 RX ADMIN — METHYLPREDNISOLONE ACETATE 80 MG: 40 INJECTION, SUSPENSION INTRA-ARTICULAR; INTRALESIONAL; INTRAMUSCULAR; SOFT TISSUE at 10:29

## 2020-10-20 RX ADMIN — METHYLPREDNISOLONE ACETATE 80 MG: 40 INJECTION, SUSPENSION INTRA-ARTICULAR; INTRALESIONAL; INTRAMUSCULAR; SOFT TISSUE at 10:28

## 2020-10-20 NOTE — PROGRESS NOTES
rashes, ulcerations or lesions. Gait: Normal      Additional Comments:       Additional Examinations:         Lower Back: Examination of the lower back does not show any tenderness, deformity or injury. Range of motion is unremarkable. There is no gross instability. There are no rashes, ulcerations or lesions. Strength and tone are normal.    Radiology:     X-rays obtained and reviewed in office:  Views 4 views the right knee demonstrates no obvious fracture dislocation. She does demonstrate some mild patellofemoral degenerative change with mild joint space loss and osteophyte formation. 4 views the left knee demonstrates no obvious fracture dislocation. She does demonstrate some mild patellofemoral degenerative change with mild joint space loss and osteophyte formation. Assessment : Bilateral knee patellofemoral chondromalacia    Impression:  Encounter Diagnoses   Name Primary?  Right knee pain, unspecified chronicity Yes    Left knee pain, unspecified chronicity        Office Procedures:  Orders Placed This Encounter   Procedures    XR KNEE RIGHT (MIN 4 VIEWS)     Standing Status:   Future     Number of Occurrences:   1     Standing Expiration Date:   10/20/2021    XR KNEE LEFT (MIN 4 VIEWS)     Standing Status:   Future     Number of Occurrences:   1     Standing Expiration Date:   10/20/2021       Treatment Plan: I discussed the diagnosis and treatment options with her today. Recommended this time trial of a cortisone injection into bilateral knees as she is having significant pain within both knees at this point time. Also going to refer her to physical therapy at this time. She is agreeable with that plan.   She will follow up with me in 6 weeks to make sure she is getting improvement, if no improvement she would be a candidate for Visco injection in the future    Under sterile conditions the bilateral knees were injected through superior lateral approach with 2 cc of 40 mg Depo-Medrol mixed with 3 cc of quarter percent Marcaine. She did tolerate the injections well.   Postinjection precautions were given

## 2020-10-26 ENCOUNTER — HOSPITAL ENCOUNTER (OUTPATIENT)
Dept: PHYSICAL THERAPY | Age: 60
Setting detail: THERAPIES SERIES
Discharge: HOME | End: 2020-10-26
Payer: COMMERCIAL

## 2020-10-26 PROCEDURE — 97110 THERAPEUTIC EXERCISES: CPT | Performed by: PHYSICAL THERAPIST

## 2020-10-26 PROCEDURE — 97161 PT EVAL LOW COMPLEX 20 MIN: CPT | Performed by: PHYSICAL THERAPIST

## 2020-10-26 NOTE — PLAN OF CARE
up/down from seated position     Type: []Constant   []Intermittent  []Radiating []Localized []other:     Numbness/Tingling: none    Functional Limitations/Impairments: []Sitting []Standing []Walking    [x]Squatting/bending  []Stairs           []ADL's  []Transfers []Sports/Recreation []Other:    Occupation/School:  Desk job     Living Status/Prior Level of Function: Independent with ADLs and IADLs  (insert highest prior level of function)    OBJECTIVE:     ROM LEFT RIGHT   HIP Flex     HIP Abd     HIP Ext     HIP IR     HIP ER     Knee ext 0 0   Knee Flex 135 135   Ankle PF     Ankle DF     Ankle In     Ankle Ev     Strength  LEFT RIGHT   HIP Flexors     HIP Abductors 4 4   HIP Ext     Hip ER     Knee EXT (quad) 4+ 4+   Knee Flex (HS) 4+ 4+   Ankle DF     Ankle PF     Ankle Inv     Ankle EV          Circumference  Mid  7 cm       LE Dermatomes     LE myotomes       Single Leg Squat:  Knee valgus  Single Leg Stance: WNL    Joint mobility:    [x]Normal    []Hypo   []Hyper    Palpation: Crepitus w/ knee ROM. Mild tenderness right lateral patellar facet    Functional Mobility/Transfers: WNL    Posture: mild knee valgus    Bandages/Dressings/Incisions: n/a    Gait: (include devices/WB status) WNL    Orthopedic Special Tests: (+)Patellar compression                       [x] Patient history, allergies, meds reviewed. Medical chart reviewed. See intake form. Review Of Systems (ROS):  [x]Performed Review of systems (Integumentary, CardioPulmonary, Neurological) by intake and observation. Intake form has been scanned into medical record. Patient has been instructed to contact their primary care physician regarding ROS issues if not already being addressed at this time.         Co-morbidities/Complexities (which will affect course of rehabilitation):   []None           Arthritic conditions   []Rheumatoid arthritis (M05.9)  [x]Osteoarthritis (M19.91)   Cardiovascular conditions   []Hypertension (I10)  []Hyperlipidemia (E78.5)  []Angina pectoris (I20)  []Atherosclerosis (I70)   Musculoskeletal conditions   []Disc pathology   []Congenital spine pathologies   []Prior surgical intervention  []Osteoporosis (M81.8)  []Osteopenia (M85.8)   Endocrine conditions   []Hypothyroid (E03.9)  []Hyperthyroid Gastrointestinal conditions   []Constipation (B56.33)   Metabolic conditions   []Morbid obesity (E66.01)  []Diabetes type 1(E10.65) or 2 (E11.65)   []Neuropathy (G60.9)     Pulmonary conditions   []Asthma (J45)  []Coughing   []COPD (J44.9)   Psychological Disorders  [x]Anxiety (F41.9)  [x]Depression (F32.9)   []Other:   []Other:          Barriers to/and or personal factors that will affect rehab potential:              []Age  []Sex              []Motivation/Lack of Motivation                        []Co-Morbidities              []Cognitive Function, education/learning barriers              []Environmental, home barriers              []profession/work barriers  []past PT/medical experience  []other:  Justification:     PACEMAKER:  - Denied having a pacemaker that would contraindicate the use of electrical modalities. METAL IMPLANTS:  - Denied metal implants that would contraindicate the use of thermal modalities. CANCER HISTORY:  - Denied a history of cancer that would contraindicate thermal modalities. Falls Risk Assessment (30 days):   [x] Falls Risk assessed and no intervention required.   [] Falls Risk assessed and Patient requires intervention due to being higher risk   TUG score (>12s at risk):     [] Falls education provided, including       ASSESSMENT:   Functional Impairments:     []Noted lumbar/proximal hip/LE joint hypomobility   []Decreased LE functional ROM   [x]Decreased core/proximal hip strength and neuromuscular control   [x]Decreased LE functional strength   []Reduced balance/proprioceptive control   []other:      Functional Activity Limitations (from functional questionnaire and intake)   []Reduced ability to tolerate prolonged functional positions   [x]Reduced ability or difficulty with changes of positions or transfers between positions   []Reduced ability to maintain good posture and demonstrate good body mechanics with sitting, bending, and lifting   [x]Reduced ability to sleep   [] Reduced ability or tolerance with driving and/or computer work   []Reduced ability to perform lifting, carrying tasks   [x]Reduced ability to squat   []Reduced ability to forward bend   [x]Reduced ability to ambulate prolonged functional periods/distances/surfaces   [x]Reduced ability to ascend/descend stairs   [x]Reduced ability to run, hop, cut or jump   []other:    Participation Restrictions   []Reduced participation in self care activities   []Reduced participation in home management activities   []Reduced participation in work activities   []Reduced participation in social activities. []Reduced participation in sport/recreation activities. Classification :    []Signs/symptoms consistent with post-surgical status including decreased ROM, strength and function.    []Signs/symptoms consistent with joint sprain/strain   [x]Signs/symptoms consistent with patella-femoral syndrome   []Signs/symptoms consistent with knee OA/hip OA   []Signs/symptoms consistent with internal derangement of knee/Hip   []Signs/symptoms consistent with functional hip weakness/NMR control      []Signs/symptoms consistent with tendinitis/tendinosis    []signs/symptoms consistent with pathology which may benefit from Dry needling      []other:      Prognosis/Rehab Potential:      []Excellent   [x]Good    []Fair   []Poor    Tolerance of evaluation/treatment:    []Excellent   [x]Good    []Fair   []Poor    Physical Therapy Evaluation Complexity Justification  [x] A history of present problem with:  [] no personal factors and/or comorbidities that impact the plan of care;  [x]1-2 personal factors and/or comorbidities that impact the plan of care  []3 personal factors and/or comorbidities that impact the plan of care  [x] An examination of body systems using standardized tests and measures addressing any of the following: body structures and functions (impairments), activity limitations, and/or participation restrictions;:  [] a total of 1-2 or more elements   [] a total of 3 or more elements   [] a total of 4 or more elements   [x] A clinical presentation with:  [x] stable and/or uncomplicated characteristics   [] evolving clinical presentation with changing characteristics  [] unstable and unpredictable characteristics;   [x] Clinical decision making of [x] low, [] moderate, [] high complexity using standardized patient assessment instrument and/or measurable assessment of functional outcome. [x] EVAL (LOW) 24976 (typically 20 minutes face-to-face)  [] EVAL (MOD) 51704 (typically 30 minutes face-to-face)  [] EVAL (HIGH) 65922 (typically 45 minutes face-to-face)  [] RE-EVAL     PLAN  Frequency/Duration:  2 days per week for 4 Weeks:  Interventions:  1. Therapeutic exercise including: strength training, ROM/flexibility, NMR and proprioception for the proximal hip, core and Lower extremity  2. Manual therapy as indicated including Dry Needling/IASTM, STM, PROM, Gr I-IV mobilizations, spinal mobilization/manipulation. 3. Modalities as needed including: thermal agents, E-stim, US, iontophoresis as indicated. 4. Patient education on joint protection, activity modification, progression of HEP. HEP instruction: Can be found scanned in media file. (see scanned forms)    GOALS:  Patient stated goal: decrease pain        Therapist goals for Patient:   Short Term Goals: To be achieved in: 2 weeks  1. Independent in HEP and progression per patient tolerance, in order to prevent re-injury. [] Progressing: [] Met: [] Not Met: [] Adjusted   2.  Patient will have a decrease in pain to facilitate improvement in movement, function, and ADLs as indicated by Functional Deficits. [] Progressing: [] Met: [] Not Met: [] Adjusted    Long Term Goals: To be achieved in: 4 weeks  1. Disability index score of 20% or less for the LEFS to assist with reaching prior level of function. [] Progressing: [] Met: [] Not Met: [] Adjusted  2. Patient will demonstrate an increase in Strength to 5/5 bilateral knee ext/hip abd to allow for proper functional mobility as indicated by patients Functional Deficits. [] Progressing: [] Met: [] Not Met: [] Adjusted   3. Patient will return to walking up/down one flight of stairs without increased symptoms or restriction.    [] Progressing: [] Met: [] Not Met: [] Adjusted     Electronically signed by:  Shazia Day PT, OMT-C

## 2020-10-27 NOTE — FLOWSHEET NOTE
[facility], [place]    Physical Therapy Treatment Note/ Progress Report:   Date:  10/27/2020    Patient Name:  Madhav Caicedo    :  1960  MRN: [id number]  Restrictions/Precautions:    Medical/Treatment Diagnosis Information:  · Diagnosis: Bilateral knee pain  M25.561, M25.562  · Treatment Diagnosis: PT treatment diagnosis:  bilateral knee pain  M25.561, P47.828  Insurance/Certification information:  PT Insurance Information: [employer]   40 visits/yr, $0 copay  Physician Information:  Referring Practitioner: Dr Wells Ely of care signed (Y/N):     Date of Patient follow up with Physician:     Is this a Progress Report:     []  Yes  [x]  No        If Yes:  Date Range for reporting period:  Beginning  Ending    Progress report will be due (10 Rx or 30 days whichever is less):        Recertification will be due (POC Duration  / 90 days whichever is less): 20         Visit # Insurance Allowable Auth Required   1 40 []  Yes []  No        Functional Scale:     Date assessed:        Latex Allergy:  [x]NO      []YES  Preferred Language for Healthcare:   [x]English       []other    Pain level:  4/10     SUBJECTIVE:  See eval    Type: []Constant   []Intermitment  []Radiating []Localized  []other:     Functional Limitations: []Sitting []Standing []Walking    []Squatting []Stairs                []ADL's  []Driving []Sports/Recreation  []Other:      OBJECTIVE:     ROM Current (R) Current (L)                       Strength                           Gait:     Joint mobility:    []Normal    []Hypo   []Hyper    Palpation:     Orthopedic Tests:     RESTRICTIONS/PRECAUTIONS: OA    Exercises/Interventions:             Script-  Stretching Reps Notes/Last Progression   Bike      Airdyne     Eliptical     Gastroc Stretch      Hamstring Stretch: Tableside 3x30'' B    Piriformis Cross Over     Figure 4 Piriformis     Supine ITB      [facility]     DKC     Adductor Stretch     Heel Prop/ Prone Hang     Wallslide     SKTC with SB     BKFO                   Exercises     Add Iso     Quad Sets     SAQ 3x10 B    SLR Flex 3x10 B    SLR ABD 3x10 B    SLR Ext     Clamshells     Prone Donkey Kick     Bridge     Ankle Theraband     BAPS     HR/TR     Squats     Lateral Walk     Single Leg Balance     EOT Hip Ext/ Donkey Kick                  Manual      Hip Mobs with Belt     Knee Mobs/PROM Grade-     Patellar Mobs     Ankle Mobs/PROM Grade-     Access Code: [email or web address]   URL: [email or web address]   Date: 10/26/2020   Prepared by: Errol Herrera     Exercises   Seated Table Hamstring Stretch - 3 reps - 30 seconds hold - 1-2x daily - 7x weekly   Supine Active Straight Leg Raise - 10 reps - 3 sets - 2 seconds hold - 1-2x daily - 7x weekly   Sidelying Hip Abduction - 10 reps - 3 sets - 2 seconds hold - 1-2x daily - 7x weekly   Supine Short Arc Quad - 10 reps - 3 sets - 2 seconds hold - 1-2x daily - 7x weekly       Therapeutic Exercise and NMR EXR  [] (56253) Provided verbal/tactile cueing for activities related to strengthening, flexibility, endurance, ROM for improvements in LE, proximal hip, and core control with self care, mobility, lifting, ambulation.  [] (36718) Provided verbal/tactile cueing for activities related to improving balance, coordination, kinesthetic sense, posture, motor skill, proprioception  to assist with LE, proximal hip, and core control in self care, mobility, lifting, ambulation and eccentric single leg control.      NMR and Therapeutic Activities:    [] (16934 or 00526) Provided verbal/tactile cueing for activities related to improving balance, coordination, kinesthetic sense, posture, motor skill, proprioception and motor activation to allow for proper function of core, proximal hip and LE with self care and ADLs  [] (33305) Gait Re-education- Provided training and instruction to the patient for proper LE, core and proximal hip recruitment and positioning and eccentric body weight control with ambulation re-education including up and down stairs     Home Exercise Program:    [x] (62931) Reviewed/Progressed HEP activities related to strengthening, flexibility, endurance, ROM of core, proximal hip and LE for functional self-care, mobility, lifting and ambulation/stair navigation   [] (65703)Reviewed/Progressed HEP activities related to improving balance, coordination, kinesthetic sense, posture, motor skill, proprioception of core, proximal hip and LE for self care, mobility, lifting, and ambulation/stair navigation      Manual Treatments:  PROM / STM / Oscillations-Mobs:  G-I, II, III, IV (PA's, Inf., Post.)  [] (13065) Provided manual therapy to mobilize LE, proximal hip and/or LS spine soft tissue/joints for the purpose of modulating pain, promoting relaxation,  increasing ROM, reducing/eliminating soft tissue swelling/inflammation/restriction, improving soft tissue extensibility and allowing for proper ROM for normal function with self care, mobility, lifting and ambulation. Modalities:      Charges:  Timed Code Treatment Minutes: 15   Total Treatment Minutes: 35     [x] EVAL (LOW) 69914 (typically 20 minutes face-to-face)  [] EVAL (MOD) 86345 (typically 30 minutes face-to-face)  [] EVAL (HIGH) 15199 (typically 45 minutes face-to-face)  [] RE-EVAL     [x] PZ(65691) x  1   [] IONTO  [] NMR (16830) x     [] VASO  [] Manual (26743) x      [] Other:  [] TA x      [] Mech Traction (34459)  [] ES(attended) (09473)      [] ES (un) (93188):     GOALS:   Short Term Goals: To be achieved in: 2 weeks  1. Independent in HEP and progression per patient tolerance, in order to prevent re-injury. [] Progressing: [] Met: [] Not Met: [] Adjusted   2. Patient will have a decrease in pain to facilitate improvement in movement, function, and ADLs as indicated by Functional Deficits. [] Progressing: [] Met: [] Not Met: [] Adjusted    Long Term Goals: To be achieved in: 4 weeks  1.  Disability index score of 20% or less for the LEFS to assist with reaching prior level of function. [] Progressing: [] Met: [] Not Met: [] Adjusted  2. Patient will demonstrate an increase in Strength to 5/5 bilateral knee ext/hip abd to allow for proper functional mobility as indicated by patients Functional Deficits. [] Progressing: [] Met: [] Not Met: [] Adjusted   3. Patient will return to walking up/down one flight of stairs without increased symptoms or restriction. [] Progressing: [] Met: [] Not Met: [] Adjusted     Progression Towards Functional goals:  [] Patient is progressing as expected towards functional goals listed. [] Progression is slowed due to complexities listed. [] Progression has been slowed due to co-morbidities. [x] Plan just implemented, too soon to assess goals progression  [] Other:     ASSESSMENT:  See eval    Treatment/Activity Tolerance:  [x] Patient tolerated treatment well [] Patient limited by fatique  [] Patient limited by pain  [] Patient limited by other medical complications  [] Other:     Prognosis: [] Good [] Fair  [] Poor    Patient Requires Follow-up: [x] Yes  [] No    PLAN: See eval  [] Continue per plan of care [] Alter current plan (see comments)  [x] Plan of care initiated [] Hold pending MD visit [] Discharge      Electronically signed by: Sammie Eisenberg PT, OMT-C      Note: If patient does not return for scheduled/ recommended follow up visits, this note will serve as a discharge from care along with most recent update on progress.

## 2020-11-02 ENCOUNTER — HOSPITAL ENCOUNTER (OUTPATIENT)
Dept: PHYSICAL THERAPY | Age: 60
Setting detail: THERAPIES SERIES
Discharge: HOME | End: 2020-11-02
Payer: COMMERCIAL

## 2020-11-02 PROCEDURE — 97110 THERAPEUTIC EXERCISES: CPT | Performed by: PHYSICAL THERAPIST

## 2020-11-02 PROCEDURE — 97112 NEUROMUSCULAR REEDUCATION: CPT | Performed by: PHYSICAL THERAPIST

## 2020-11-02 PROCEDURE — 97140 MANUAL THERAPY 1/> REGIONS: CPT | Performed by: PHYSICAL THERAPIST

## 2020-11-02 NOTE — FLOWSHEET NOTE
[facility]    [facility] Therapy Treatment Note/ Progress Report:   Date:  2020    Patient Name:  Rusty Valle    :  1960  MRN: [id number]  Restrictions/Precautions:    Medical/Treatment Diagnosis Information:  · Diagnosis: Bilateral knee pain  M25.561, M25.562  · Treatment Diagnosis: PT treatment diagnosis:  bilateral knee pain  M25.561, A82.148  Insurance/Certification information:  PT Insurance Information: [employer]   40 visits/yr, $0 copay  Physician Information:  Referring Practitioner: Dr Armaan Hannah of care signed (Y/N):     Date of Patient follow up with Physician:     Is this a Progress Report:     []  Yes  [x]  No        If Yes:  Date Range for reporting period:  Beginning  Ending    Progress report will be due (10 Rx or 30 days whichever is less):        Recertification will be due (POC Duration  / 90 days whichever is less): 20         Visit # Insurance Allowable Auth Required   2 40 []  Yes []  No        Functional Scale:     Date assessed:        Latex Allergy:  [x]NO      []YES  Preferred Language for Healthcare:   [x]English       []other    Pain level:  4/10     SUBJECTIVE:   Reports the knees have been feeling a little better at night but not much change during the day.       Type: []Constant   []Intermitment  []Radiating []Localized  []other:     Functional Limitations: []Sitting []Standing []Walking    []Squatting []Stairs                []ADL's  []Driving []Sports/Recreation  []Other:      OBJECTIVE:     ROM Current (R) Current (L)                       Strength                           Gait:     Joint mobility:    []Normal    []Hypo   []Hyper    Palpation:     Orthopedic Tests:     RESTRICTIONS/PRECAUTIONS: OA    Exercises/Interventions:             Script-  Stretching Reps Notes/Last Progression   Bike  5'    Airdyne     Eliptical     Gastroc Stretch  3x30'' Incline board   Hamstring Stretch: Tableside 3x30'' B manual   Piriformis Cross Over     Figure 4 Piriformis     Supine ITB      SKC     DKC     Adductor Stretch     Heel Prop/ Prone Hang     Wallslide     SKTC with SB     BKFO                   Exercises     Add Iso     Quad Sets     SAQ 3x10 B    SLR Flex 3x10 B    SLR ABD 3x10 B    SLR Ext     Clamshells [place] loop 30x B    Prone [employer] w/ ball squeeze 3x10    Ankle Theraband     BAPS     HR/TR     Squats     Lateral Walk     Single Leg Balance     Leg press 80# 30x     TKE on MELL 60# 30x B    MELL: abd 45# 30x B    EOT Hip Ext/ Donkey Kick                  Manual      Hip Mobs with Belt     Knee Mobs/PROM Grade-     Patellar Mobs     Ankle Mobs/PROM Grade-     Access Code: [email or web address]   URL: [email or web address]   Date: 10/26/2020   Prepared by: Andre Esquivel     Exercises   Seated Table Hamstring Stretch - 3 reps - 30 seconds hold - 1-2x daily - 7x weekly   Supine Active Straight Leg Raise - 10 reps - 3 sets - 2 seconds hold - 1-2x daily - 7x weekly   Sidelying Hip Abduction - 10 reps - 3 sets - 2 seconds hold - 1-2x daily - 7x weekly   Supine Short Arc Quad - 10 reps - 3 sets - 2 seconds hold - 1-2x daily - 7x weekly       Therapeutic Exercise and NMR EXR  [x] (71711) Provided verbal/tactile cueing for activities related to strengthening, flexibility, endurance, ROM for improvements in LE, proximal hip, and core control with self care, mobility, lifting, ambulation.  [] (35657) Provided verbal/tactile cueing for activities related to improving balance, coordination, kinesthetic sense, posture, motor skill, proprioception  to assist with LE, proximal hip, and core control in self care, mobility, lifting, ambulation and eccentric single leg control.      NMR and Therapeutic Activities:    [] (04252 or 46573) Provided verbal/tactile cueing for activities related to improving balance, coordination, kinesthetic sense, posture, motor skill, proprioception and motor activation to allow for proper function of core, proximal hip and LE with self care and ADLs  [] (08750) Gait Re-education- Provided training and instruction to the patient for proper LE, core and proximal hip recruitment and positioning and eccentric body weight control with ambulation re-education including up and down stairs     Home Exercise Program:    [x] (50155) Reviewed/Progressed HEP activities related to strengthening, flexibility, endurance, ROM of core, proximal hip and LE for functional self-care, mobility, lifting and ambulation/stair navigation   [] (32938)Reviewed/Progressed HEP activities related to improving balance, coordination, kinesthetic sense, posture, motor skill, proprioception of core, proximal hip and LE for self care, mobility, lifting, and ambulation/stair navigation      Manual Treatments:  PROM / STM / Oscillations-Mobs:  G-I, II, III, IV (PA's, Inf., Post.)  [] (22345) Provided manual therapy to mobilize LE, proximal hip and/or LS spine soft tissue/joints for the purpose of modulating pain, promoting relaxation,  increasing ROM, reducing/eliminating soft tissue swelling/inflammation/restriction, improving soft tissue extensibility and allowing for proper ROM for normal function with self care, mobility, lifting and ambulation. Modalities:      Charges:  Timed Code Treatment Minutes: 45   Total Treatment Minutes: 45     [] EVAL (LOW) 98357 (typically 20 minutes face-to-face)  [] EVAL (MOD) 40045 (typically 30 minutes face-to-face)  [] EVAL (HIGH) 53731 (typically 45 minutes face-to-face)  [] RE-EVAL     [x] UP(19223) x  2   [] IONTO  [x] NMR (21756) x 1    [] VASO  [] Manual (55055) x      [] Other:  [] TA x      [] Mech Traction (49727)  [] ES(attended) (85520)      [] ES (un) (43189):     GOALS:   Short Term Goals: To be achieved in: 2 weeks  1. Independent in HEP and progression per patient tolerance, in order to prevent re-injury. [] Progressing: [] Met: [] Not Met: [] Adjusted   2.  Patient will have a decrease in pain to facilitate improvement in movement, function, and ADLs as indicated by Functional Deficits. [] Progressing: [] Met: [] Not Met: [] Adjusted    Long Term Goals: To be achieved in: 4 weeks  1. Disability index score of 20% or less for the LEFS to assist with reaching prior level of function. [] Progressing: [] Met: [] Not Met: [] Adjusted  2. Patient will demonstrate an increase in Strength to 5/5 bilateral knee ext/hip abd to allow for proper functional mobility as indicated by patients Functional Deficits. [] Progressing: [] Met: [] Not Met: [] Adjusted   3. Patient will return to walking up/down one flight of stairs without increased symptoms or restriction. [] Progressing: [] Met: [] Not Met: [] Adjusted     Progression Towards Functional goals:  [] Patient is progressing as expected towards functional goals listed. [] Progression is slowed due to complexities listed. [] Progression has been slowed due to co-morbidities. [x] Plan just implemented, too soon to assess goals progression  [] Other:     ASSESSMENT:  Tolerated Rx well without increased knee pain. Did fatigue easily with progression of glut strengthening. Treatment/Activity Tolerance:  [x] Patient tolerated treatment well [] Patient limited by fatique  [] Patient limited by pain  [] Patient limited by other medical complications  [] Other:     Prognosis: [x] Good [] Fair  [] Poor    Patient Requires Follow-up: [x] Yes  [] No    PLAN: See eval  [x] Continue per plan of care [] Alter current plan (see comments)  [] Plan of care initiated [] Hold pending MD visit [] Discharge      Electronically signed by: Dara Vera PT, LEONAC      Note: If patient does not return for scheduled/ recommended follow up visits, this note will serve as a discharge from care along with most recent update on progress.

## 2020-12-01 ENCOUNTER — OFFICE VISIT (OUTPATIENT)
Dept: ORTHOPEDIC SURGERY | Age: 60
End: 2020-12-01
Payer: COMMERCIAL

## 2020-12-01 PROCEDURE — 99213 OFFICE O/P EST LOW 20 MIN: CPT | Performed by: ORTHOPAEDIC SURGERY

## 2020-12-01 NOTE — PROGRESS NOTES
Chief Complaint    Follow-up (bilat knees)      History of Present Illness:  Ran Jiménez is a 62 y.o. female. Follow-up for her bilateral knees. Does continue to have anterior bilateral knee pain. Feels like she has had some improvement with physical therapy. Did not get any improvement with the cortisone injections. Does continue to be limited with activity because of the anterior knee pain. Does continue to be limited doing stairs. Medical History:  Patient's medications, allergies, past medical, surgical, social and family histories were reviewed and updated as appropriate. Review of Systems:  Pertinent items are noted in HPI  Review of systems reviewed from Patient History Form dated on 12/1/20 and available in the patient's chart under the Media tab. Vital Signs: There were no vitals taken for this visit. General Exam:   Constitutional: Patient is adequately groomed with no evidence of malnutrition  DTRs: Deep tendon reflexes are intact  Mental Status: The patient is oriented to time, place and person. The patient's mood and affect are appropriate. Knee Examination:    Inspection: No significant swelling erythema noted but bilateral knees today     Palpation: There is some palpable crepitus over the bilateral patellofemoral joints. No significant tenderness over the bilateral medial or lateral joint lines.     Range of Motion: Extension is 0 degrees bilaterally, knee flexion today is 130 degrees     Strength: She is able to do a straight leg raise bilaterally     Special Tests: Negative Lachman exam.  No instability to varus and valgus stress testing. Negative posterior drawer. Negative Apley Gloria     Skin: There are no rashes, ulcerations or lesions.     Gait: Normal    Additional Comments:       Additional Examinations:         Lower Back: Examination of the lower back does not show any tenderness, deformity or injury. Range of motion is unremarkable.   There is no gross instability. There are no rashes, ulcerations or lesions. Strength and tone are normal.           Assessment : Bilateral knee patellofemoral chondromalacia    Impression:  Encounter Diagnosis   Name Primary?  Patellofemoral chondrosis, unspecified laterality Yes       Office Procedures:  No orders of the defined types were placed in this encounter. Treatment Plan: We are going to see if we can get her approved for Visco injections in the bilateral knees at this point time. If these do get denied through insurance I would then recommend moving forward with an MRI to rule out meniscus tear within the knees. She is agreeable with this plan. She will continue with physical therapy in the meantime. Continue with the use of anti-inflammatories.

## 2020-12-07 ENCOUNTER — OFFICE VISIT (OUTPATIENT)
Dept: GYNECOLOGY | Age: 60
End: 2020-12-07
Payer: COMMERCIAL

## 2020-12-07 ENCOUNTER — HOSPITAL ENCOUNTER (OUTPATIENT)
Dept: PHYSICAL THERAPY | Age: 60
Setting detail: THERAPIES SERIES
Discharge: HOME | End: 2020-12-07
Payer: COMMERCIAL

## 2020-12-07 VITALS
HEART RATE: 67 BPM | WEIGHT: 168.8 LBS | HEIGHT: 70 IN | BODY MASS INDEX: 24.17 KG/M2 | RESPIRATION RATE: 17 BRPM | DIASTOLIC BLOOD PRESSURE: 74 MMHG | TEMPERATURE: 97.2 F | SYSTOLIC BLOOD PRESSURE: 116 MMHG

## 2020-12-07 PROCEDURE — 97112 NEUROMUSCULAR REEDUCATION: CPT | Performed by: PHYSICAL THERAPIST

## 2020-12-07 PROCEDURE — 97110 THERAPEUTIC EXERCISES: CPT | Performed by: PHYSICAL THERAPIST

## 2020-12-07 PROCEDURE — 99396 PREV VISIT EST AGE 40-64: CPT | Performed by: OBSTETRICS & GYNECOLOGY

## 2020-12-07 RX ORDER — ESTRADIOL 10 UG/1
10 INSERT VAGINAL
Qty: 36 TABLET | Refills: 3 | Status: SHIPPED | OUTPATIENT
Start: 2020-12-07 | End: 2022-01-24 | Stop reason: SDUPTHER

## 2020-12-07 NOTE — PLAN OF CARE
[facility], [place]              Physical Therapy Re-Certification Plan of [place]         Dear Dr Eriberto Morse,    We had the pleasure of treating the following patient for physical therapy services at [address] [place] [address]. A summary of our findings can be found in the updated assessment below. This includes our plan of care. If you have any questions or concerns regarding these findings, please do not hesitate to contact me at the office phone number checked above.   Thank you for the referral.     Physician Signature:________________________________Date:__________________  By signing above (or electronic signature), therapists plan is approved by physician    Date Range Of Visits: 10/26/20-20  Total Visits to Date: 3  Overall Response to Treatment:   []Patient is responding well to treatment and improvement is noted with regards  to goals   []Patient should continue to improve in reasonable time if they continue HEP   []Patient has plateaued and is no longer responding to skilled PT intervention    []Patient is getting worse and would benefit from return to referring MD   [x]Patient unable to adhere to initial POC   []Other: Patient did not attend PT from  until 20  Plan:  Continue 1-2x wk/ 4 wks    Physical Therapy Treatment Note/ Progress Report:   Date:  2020    Patient Name:  Lillian Mccauley    :  1960  MRN: [id number]  Restrictions/Precautions:    Medical/Treatment Diagnosis Information:  · Diagnosis: Bilateral knee pain  M25.561, M25.562  · Treatment Diagnosis: PT treatment diagnosis:  bilateral knee pain  M25.561, S53.260  Insurance/Certification information:  PT Insurance Information: [employer]   40 visits/yr, $0 copay  Physician Information:  Referring Practitioner: Dr Michlele Wilcox of care signed (Y/N):     Date of Patient follow up with Physician:     Is this a Progress Report:     [x]  Yes  []  No        If Yes:  Date Range for reporting period:  Beginning 10/26/20  Ending 12/7/20    Progress report will be due (10 Rx or 30 days whichever is less): 0/1/49       Recertification will be due (POC Duration  / 90 days whichever is less): 1/7/21        Visit # Insurance Allowable Auth Required   3 40 []  Yes []  No        Functional Scale:  LEFS: 15%   Date assessed:   12/7/20     Latex Allergy:  [x]NO      []YES  Preferred Language for Healthcare:   [x]English       []other    Pain level:  4/10     SUBJECTIVE:   Reports the knees feel slightly better over the past month. R knee is doing a little better than the L. Continue to have pain with squatting and going up/down stairs.       Type: []Constant   [x]Intermitment  []Radiating []Localized  []other:     Functional Limitations: []Sitting []Standing [x]Walking    [x]Squatting [x]Stairs                []ADL's  []Driving []Sports/Recreation  []Other:      OBJECTIVE:     ROM Current (R) Current (L)   knee WNL WNL                  Strength     Knee ext 5/5 4+/5   Hip abd 4+/5 4+/5               Gait: WNL    Joint mobility:    [x]Normal    []Hypo   []Hyper    Palpation: medial, lateral patellar facets    Orthopedic Tests: n/a    RESTRICTIONS/PRECAUTIONS: OA    Exercises/Interventions:             Script-  Stretching Reps Notes/Last Progression   Bike  5'    Airdyne     Eliptical     Gastroc Stretch  3x30'' Incline board   Hamstring Stretch: Tableside 3x30'' B manual   Piriformis Cross Over     Figure 4 Piriformis     Supine ITB      SKC     DKC     Adductor Stretch     Heel Prop/ Prone Hang     Wallslide     SKTC with SB     BKFO                   Exercises     Add Iso     Quad Sets     SAQ 3x10 B    SLR Flex 2x15 B staggered   SLR ABD 3x10 B    SLR Ext     Clamshells [place] loop 30x B    Prone [employer] on SB 3x10    Ankle Theraband     BAPS     HR/TR     Squats     Lateral Walk     Single Leg Balance     Leg press 90# 30x     TKE on [facility] 60# 30x B    MELL: abd 45# 30x B    EOT Hip Ext/ Donkey Kick                  Manual      Hip Mobs with Belt     Knee Mobs/PROM Grade-     Patellar Mobs     Ankle Mobs/PROM Grade-     Access Code: [email or web address]   URL: [email or web address]   Date: 10/26/2020   Prepared by: Nancy Ice     Exercises   Seated Table Hamstring Stretch - 3 reps - 30 seconds hold - 1-2x daily - 7x weekly   Supine Active Straight Leg Raise - 10 reps - 3 sets - 2 seconds hold - 1-2x daily - 7x weekly   Sidelying Hip Abduction - 10 reps - 3 sets - 2 seconds hold - 1-2x daily - 7x weekly   Supine Short Arc Quad - 10 reps - 3 sets - 2 seconds hold - 1-2x daily - 7x weekly       Therapeutic Exercise and NMR EXR  [x] (28905) Provided verbal/tactile cueing for activities related to strengthening, flexibility, endurance, ROM for improvements in LE, proximal hip, and core control with self care, mobility, lifting, ambulation.  [] (85832) Provided verbal/tactile cueing for activities related to improving balance, coordination, kinesthetic sense, posture, motor skill, proprioception  to assist with LE, proximal hip, and core control in self care, mobility, lifting, ambulation and eccentric single leg control.      NMR and Therapeutic Activities:    [] (99894 or 56695) Provided verbal/tactile cueing for activities related to improving balance, coordination, kinesthetic sense, posture, motor skill, proprioception and motor activation to allow for proper function of core, proximal hip and LE with self care and ADLs  [] (29404) Gait Re-education- Provided training and instruction to the patient for proper LE, core and proximal hip recruitment and positioning and eccentric body weight control with ambulation re-education including up and down stairs     Home Exercise Program:    [x] (99437) Reviewed/Progressed HEP activities related to strengthening, flexibility, endurance, ROM of core, proximal hip and LE for functional self-care, mobility, lifting and ambulation/stair navigation   [] (06518)Reviewed/Progressed HEP activities related to improving balance, coordination, kinesthetic sense, posture, motor skill, proprioception of core, proximal hip and LE for self care, mobility, lifting, and ambulation/stair navigation      Manual Treatments:  PROM / STM / Oscillations-Mobs:  G-I, II, III, IV (PA's, Inf., Post.)  [] (36404) Provided manual therapy to mobilize LE, proximal hip and/or LS spine soft tissue/joints for the purpose of modulating pain, promoting relaxation,  increasing ROM, reducing/eliminating soft tissue swelling/inflammation/restriction, improving soft tissue extensibility and allowing for proper ROM for normal function with self care, mobility, lifting and ambulation. Modalities:      Charges:  Timed Code Treatment Minutes: 45   Total Treatment Minutes: 45     [] EVAL (LOW) 45293 (typically 20 minutes face-to-face)  [] EVAL (MOD) 21581 (typically 30 minutes face-to-face)  [] EVAL (HIGH) 71217 (typically 45 minutes face-to-face)  [] RE-EVAL     [x] [place](67700) x  2   [] IONTO  [x] NMR (68798) x 1    [] VASO  [] Manual (54971) x      [] Other:  [] TA x      [] Mech Traction (00910)  [] ES(attended) (75709)      [] ES (un) (54664):     GOALS:   Short Term Goals: To be achieved in: 2 weeks  1. Independent in HEP and progression per patient tolerance, in order to prevent re-injury. [] Progressing: [] Met: [] Not Met: [] Adjusted   2. Patient will have a decrease in pain to facilitate improvement in movement, function, and ADLs as indicated by Functional Deficits. [] Progressing: [] Met: [] Not Met: [] Adjusted    Long Term Goals: To be achieved in: 4 weeks  1. Disability index score of 20% or less for the LEFS to assist with reaching prior level of function. [] Progressing: [] Met: [] Not Met: [] Adjusted  2.  Patient will demonstrate an increase in Strength to 5/5 bilateral knee ext/hip abd to allow for proper functional mobility as indicated by patients Functional

## 2020-12-09 ENCOUNTER — HOSPITAL ENCOUNTER (OUTPATIENT)
Dept: PHYSICAL THERAPY | Age: 60
Setting detail: THERAPIES SERIES
Discharge: HOME | End: 2020-12-09
Payer: COMMERCIAL

## 2020-12-09 PROCEDURE — 97110 THERAPEUTIC EXERCISES: CPT | Performed by: PHYSICAL THERAPIST

## 2020-12-09 PROCEDURE — 97112 NEUROMUSCULAR REEDUCATION: CPT | Performed by: PHYSICAL THERAPIST

## 2020-12-09 ASSESSMENT — ENCOUNTER SYMPTOMS
EYES NEGATIVE: 1
GASTROINTESTINAL NEGATIVE: 1
RESPIRATORY NEGATIVE: 1

## 2020-12-10 NOTE — PROGRESS NOTES
Subjective:      Patient ID: Alvaro Garcia is a 62 y.o. female. Patient here for annual. Patient in menopause. Patient with vaginal dryness-questions about vaginal discharge. Gynecologic Exam         Review of Systems   Constitutional: Negative. HENT: Negative. Eyes: Negative. Respiratory: Negative. Cardiovascular: Negative. Gastrointestinal: Negative. Genitourinary: Positive for vaginal pain. Musculoskeletal: Negative. Skin: Negative. Neurological: Negative. Psychiatric/Behavioral: Negative.       Date of Birth 1960  Past Medical History:   Diagnosis Date    Alcoholism [facility])     Recovering    Chronic left-sided low back pain without sciatica 2020    Degenerative joint disease     Depression     Fibrocystic disease of breast 6/15/2010    GERD (gastroesophageal reflux disease)     Headache(784.0)     Hemorrhoid 6/15/2010    Hyperlipidemia 2019    Mitral valve regurgitation     Osteoporosis     Screening mammogram, encounter for 2018    Dr Justino Ribeiro MD    Urinary incontinence     Vitamin D deficiency 3/16/2012     Past Surgical History:   Procedure Laterality Date    BLADDER SURGERY      with hysterectomy-vaginal approach    BREAST SURGERY  2006    benign cyst on left    COLONOSCOPY  2012    Dr Allen Patel      facial 12.11    FACIAL COSMETIC SURGERY      HYSTERECTOMY  1996    SALPINGO-OOPHORECTOMY      still with left ovary     OB History    Para Term  AB Living   2 2 2     2   SAB TAB Ectopic Molar Multiple Live Births             2      # Outcome Date GA Lbr Frantz/2nd Weight Sex Delivery Anes PTL Lv   2 Term 01/10/95 40w0d   M Vag-Spont   YEIMI   1 Term 90 40w0d   M Vag-Spont   YEIMI     Social History     Socioeconomic History    Marital status:      Spouse name: Not on file    Number of children: Not on file    Years of education: Not on file    Highest education level: Not on file   Occupational History    Not on file   Social Needs    Financial resource strain: Not on file    Food insecurity     Worry: Not on file     Inability: Not on file    Transportation needs     Medical: Not on file     Non-medical: Not on file   Tobacco Use    Smoking status: Never Smoker    Smokeless tobacco: Never Used   Substance and Sexual Activity    Alcohol use: No     Alcohol/week: 0.0 standard drinks    Drug use: No    Sexual activity: Yes     Partners: Male   Lifestyle    Physical activity     Days per week: Not on file     Minutes per session: Not on file    Stress: Not on file   Relationships    Social connections     Talks on phone: Not on file     Gets together: Not on file     Attends Taoist service: Not on file     Active member of club or organization: Not on file     Attends meetings of clubs or organizations: Not on file     Relationship status: Not on file    Intimate partner violence     Fear of current or ex partner: Not on file     Emotionally abused: Not on file     Physically abused: Not on file     Forced sexual activity: Not on file   Other Topics Concern    Not on file   Social History Narrative    Not on file     No Known Allergies  Outpatient Medications Marked as Taking for the 12/7/20 encounter (Office Visit) with Sujit Marina MD   Medication Sig Dispense Refill    Estradiol (YUVAFEM) 10 MCG TABS vaginal tablet Place 1 tablet vaginally Twice a Week 36 tablet 3    buPROPion (WELLBUTRIN XL) 300 MG extended release tablet TAKE 1 TABLET BY MOUTH EVERY DAY IN THE MORNING 90 tablet 1    pregabalin (LYRICA) 200 MG capsule Take 1 capsule by mouth daily for 180 days.  90 capsule 1    esomeprazole (NEXIUM) 40 MG delayed release capsule Take 1 capsule by mouth every morning (before breakfast) 90 capsule 1    diclofenac (VOLTAREN) 75 MG EC tablet TAKE 1 TABLET BY MOUTH TWICE A  tablet 1    traZODone (DESYREL) 100 MG tablet TAKE 2 TABLETS BY MOUTH EVERY DAY AT NIGHT 180 tablet 2    predniSONE (DELTASONE) 10 MG tablet 4 po daily for 3 days, 3 for 3 days, 2 for 3 days, 1 for 3 days, then stop 20 tablet 0    Omega-3 Fatty Acids (FISH OIL PO) Take by mouth      Cholecalciferol (VITAMIN D PO) Take by mouth       Family History   Problem Relation Age of Onset    High Blood Pressure Mother     High Cholesterol Mother     High Blood Pressure Father     High Cholesterol Father     Arthritis Father     Arthritis Paternal Grandmother     Rheum Arthritis Neg Hx     Osteoarthritis Neg Hx     Asthma Neg Hx     Breast Cancer Neg Hx     Cancer Neg Hx     Diabetes Neg Hx     Heart Failure Neg Hx     Hypertension Neg Hx     Migraines Neg Hx     Ovarian Cancer Neg Hx     Rashes/Skin Problems Neg Hx     Seizures Neg Hx     Stroke Neg Hx     Thyroid Disease Neg Hx      /74 (Site: Left Upper Arm, Position: Sitting, Cuff Size: Large Adult)   Pulse 67   Temp 97.2 °F (36.2 °C)   Resp 17   Ht 5' 10\" (1.778 m)   Wt 168 lb 12.8 oz (76.6 kg)   BMI 24.22 kg/m²       Objective:   Physical Exam  Constitutional:       General: She is not in acute distress. Appearance: Normal appearance. She is well-developed and normal weight. She is not diaphoretic. HENT:      Head: Normocephalic. Nose: Nose normal.      Mouth/Throat:      Mouth: Mucous membranes are moist.      Pharynx: Oropharynx is clear. Eyes:      Pupils: Pupils are equal, round, and reactive to light. Neck:      Musculoskeletal: Normal range of motion and neck supple. No neck rigidity. Thyroid: No thyromegaly. Cardiovascular:      Rate and Rhythm: Normal rate and regular rhythm. Heart sounds: Normal heart sounds. No murmur. No friction rub. No gallop. Pulmonary:      Effort: Pulmonary effort is normal. No respiratory distress. Breath sounds: Normal breath sounds. No wheezing or rales. Chest:      Chest wall: No tenderness.       Breasts:         Right: No swelling, bleeding, inverted nipple, mass, nipple discharge, skin change or tenderness. Left: No swelling, bleeding, inverted nipple, mass, nipple discharge, skin change or tenderness. Abdominal:      General: Abdomen is flat. There is no distension. Palpations: Abdomen is soft. There is no hepatomegaly or mass. Tenderness: There is no abdominal tenderness. There is no guarding or rebound. Hernia: No hernia is present. There is no hernia in the left inguinal area. Genitourinary:     Exam position: Lithotomy position. Labia:         Right: No rash, tenderness, lesion or injury. Left: No rash, tenderness, lesion or injury. Urethra: No prolapse, urethral pain, urethral swelling or urethral lesion. Vagina: Normal. No signs of injury and foreign body. No vaginal discharge, erythema, tenderness, bleeding or lesions. Adnexa: Right adnexa normal and left adnexa normal.        Right: No mass, tenderness or fullness. Left: No mass, tenderness or fullness. Rectum: Normal. Guaiac result negative. No mass, tenderness, anal fissure, external hemorrhoid or internal hemorrhoid. Normal anal tone. Comments: Normal urethral meatus, nl urethra, nl bladder. Musculoskeletal: Normal range of motion. General: No tenderness. Lymphadenopathy:      Cervical: No cervical adenopathy. Skin:     General: Skin is warm and dry. Neurological:      General: No focal deficit present. Mental Status: She is alert and oriented to person, place, and time. Mental status is at baseline. Deep Tendon Reflexes: Reflexes are normal and symmetric. Psychiatric:         Mood and Affect: Mood normal.         Behavior: Behavior normal.         Thought Content: Thought content normal.         Judgment: Judgment normal.         Assessment:      1. Annual  2. Menopause  3. Vaginal dryness      Plan:      1. Pap, calcium, exercise, mammogram, hemocult negative  2. And 3.  Vaginal dryness-encouraged to use vaginal estrogen for 30 nights then go to twice weekly. Can use lubricants other nights. Call if not better.          Christiano Mancini MD

## 2020-12-16 ENCOUNTER — OFFICE VISIT (OUTPATIENT)
Dept: FAMILY MEDICINE CLINIC | Age: 60
End: 2020-12-16
Payer: COMMERCIAL

## 2020-12-16 VITALS
HEART RATE: 72 BPM | OXYGEN SATURATION: 98 % | SYSTOLIC BLOOD PRESSURE: 104 MMHG | DIASTOLIC BLOOD PRESSURE: 76 MMHG | BODY MASS INDEX: 23.76 KG/M2 | WEIGHT: 165.6 LBS

## 2020-12-16 PROCEDURE — 99213 OFFICE O/P EST LOW 20 MIN: CPT | Performed by: NURSE PRACTITIONER

## 2020-12-16 ASSESSMENT — ENCOUNTER SYMPTOMS
COUGH: 0
RHINORRHEA: 0
SORE THROAT: 0

## 2020-12-17 ENCOUNTER — HOSPITAL ENCOUNTER (OUTPATIENT)
Dept: PHYSICAL THERAPY | Age: 60
Setting detail: THERAPIES SERIES
Discharge: HOME | End: 2020-12-17
Payer: COMMERCIAL

## 2020-12-17 ENCOUNTER — TELEPHONE (OUTPATIENT)
Dept: ORTHOPEDIC SURGERY | Age: 60
End: 2020-12-17

## 2020-12-17 NOTE — TELEPHONE ENCOUNTER
12/17/2020  GEL-[facility]    BILATERAL KNEES. APPROVED  SPECIALTY PHARMACY COVERAGE    [facility] APPROVED:  PA# [address] [id number]  DM. DATES: 12/17/2020 - 12/17/2021. PER FAX FROM [facility] FOR BCBS. AP    12/17/2020 . VERBAL GIVEN TO DEANNA. THEY WILL VERIFY COVERAGE THEN CONTACT PATIENT FOR COPAYMENT AND CONSENT TO SHIP TO [facility]. DERRICK    NOTE: Original request was for non-preferred drug DUROLANE . Okay to switch to preferred drug [facility], Per Belia Ventura .  AP

## 2020-12-17 NOTE — FLOWSHEET NOTE
[facility] [address], [facility]      Physical Therapy  Cancellation/No-show Note  Patient Name:  Jose Manuel Anne  :  1960   Date:  2020  Cancelled visits to date: 0  No-shows to date: 2    For today's appointment patient:  []  Cancelled  []  Rescheduled appointment  [x]  No-show     Reason given by patient:  []  Patient ill  []  Conflicting appointment  []  No transportation    []  Conflict with work  []  No reason given  []  Other:     Comments:      Electronically signed by:  Uday You PT, OMT-C

## 2020-12-21 ENCOUNTER — HOSPITAL ENCOUNTER (OUTPATIENT)
Dept: PHYSICAL THERAPY | Age: 60
Setting detail: THERAPIES SERIES
Discharge: HOME | End: 2020-12-21
Payer: COMMERCIAL

## 2020-12-21 PROCEDURE — 97110 THERAPEUTIC EXERCISES: CPT | Performed by: PHYSICAL THERAPIST

## 2020-12-21 PROCEDURE — 97112 NEUROMUSCULAR REEDUCATION: CPT | Performed by: PHYSICAL THERAPIST

## 2020-12-21 NOTE — FLOWSHEET NOTE
[facility], [facility]       Physical Therapy Treatment Note/ Progress Report:   Date:  2020    Patient Name:  Dia Ruiz    :  1960  MRN: [id number]  Restrictions/Precautions:    Medical/Treatment Diagnosis Information:  · Diagnosis: Bilateral knee pain  M25.561, M25.562  · Treatment Diagnosis: PT treatment diagnosis:  bilateral knee pain  M25.561, N20.586  Insurance/Certification information:  PT Insurance Information: [place]   40 visits/yr, $0 copay  Physician Information:  Referring Practitioner: Dr Yohan Ayon of care signed (Y/N):     Date of Patient follow up with Physician:     Is this a Progress Report:     [x]  Yes  []  No        If Yes:  Date Range for reporting period:  Beginning 10/26/20  Ending 20    Progress report will be due (10 Rx or 30 days whichever is less): 04       Recertification will be due (POC Duration  / 90 days whichever is less): 21        Visit # Insurance Allowable Auth Required   5 40 []  Yes []  No        Functional Scale:  LEFS: 15%   Date assessed:   20     Latex Allergy:  [x]NO      []YES  Preferred Language for Healthcare:   [x]English       []other    Pain level:  4/10     SUBJECTIVE:   Patient states that her knees have been feeling better the past couple of weeks but she has constant pain/acheyness.      Type: []Constant   [x]Intermitment  []Radiating []Localized  []other:     Functional Limitations: []Sitting []Standing [x]Walking    [x]Squatting [x]Stairs                []ADL's  []Driving []Sports/Recreation  []Other:      OBJECTIVE:     ROM Current (R) Current (L)   knee WNL WNL                  Strength     Knee ext 5/5 4+/5   Hip abd 4+/5 4+/5               Gait: WNL    Joint mobility:    [x]Normal    []Hypo   []Hyper    Palpation: medial, lateral patellar facets    Orthopedic Tests: n/a    RESTRICTIONS/PRECAUTIONS: OA    Exercises/Interventions:             Script-  Stretching Reps Notes/Last Progression   Bike  5'    Airdyne     Eliptical     Gastroc Stretch  3x30'' Incline board   Hamstring Stretch: Tableside 3x30'' B manual   Piriformis Cross Over     Figure 4 Piriformis     Supine ITB  3x30\" B Manual   SKC     DKC     Adductor Stretch     Heel Prop/ Prone Hang     Wallslide     SKTC with SB     BKFO                   Exercises     Add Iso     Quad Sets         HEPSLR ABD 2x10 B 2#   SLR Ext     Clamshells Green loop 30x B    Prone [employer] on SB 2x15    Ankle Theraband     BAPS     HR/TR     Wall Squats 2x10   Red SB   Lateral Walk     Single Leg Balance     Hamstring Curl 50# 2x10    Leg press  90# 30x     TKE on [facility] 45# 30x B    MELL: ABD/ Ext 45# 30x B/ 60# 30x B Increase Ext Weight NV   EOT Hip Ext/ Donkey Kick                  Manual      Hip Mobs with Belt     Knee Mobs/PROM Grade-     Patellar Mobs     Ankle Mobs/PROM Grade-     Access Code: [email or web address]   URL: [email or web address]   Date: 10/26/2020   Prepared by: Akshat Conway     Exercises   Seated Table Hamstring Stretch - 3 reps - 30 seconds hold - 1-2x daily - 7x weekly   Supine Active Straight Leg Raise - 10 reps - 3 sets - 2 seconds hold - 1-2x daily - 7x weekly   Sidelying Hip Abduction - 10 reps - 3 sets - 2 seconds hold - 1-2x daily - 7x weekly   Supine Short Arc Quad - 10 reps - 3 sets - 2 seconds hold - 1-2x daily - 7x weekly       Therapeutic Exercise and NMR EXR  [x] (31236) Provided verbal/tactile cueing for activities related to strengthening, flexibility, endurance, ROM for improvements in LE, proximal hip, and core control with self care, mobility, lifting, ambulation. [x] (07751) Provided verbal/tactile cueing for activities related to improving balance, coordination, kinesthetic sense, posture, motor skill, proprioception  to assist with LE, proximal hip, and core control in self care, mobility, lifting, ambulation and eccentric single leg control.      NMR and Therapeutic Activities:    [] (99685 or ) Provided verbal/tactile cueing for activities related to improving balance, coordination, kinesthetic sense, posture, motor skill, proprioception and motor activation to allow for proper function of core, proximal hip and LE with self care and ADLs  [] (34520) Gait Re-education- Provided training and instruction to the patient for proper LE, core and proximal hip recruitment and positioning and eccentric body weight control with ambulation re-education including up and down stairs     Home Exercise Program:    [x] (82147) Reviewed/Progressed HEP activities related to strengthening, flexibility, endurance, ROM of core, proximal hip and LE for functional self-care, mobility, lifting and ambulation/stair navigation   [] (61007)Reviewed/Progressed HEP activities related to improving balance, coordination, kinesthetic sense, posture, motor skill, proprioception of core, proximal hip and LE for self care, mobility, lifting, and ambulation/stair navigation      Manual Treatments:  PROM / STM / Oscillations-Mobs:  G-I, II, III, IV (PA's, Inf., Post.)  [] (03885) Provided manual therapy to mobilize LE, proximal hip and/or LS spine soft tissue/joints for the purpose of modulating pain, promoting relaxation,  increasing ROM, reducing/eliminating soft tissue swelling/inflammation/restriction, improving soft tissue extensibility and allowing for proper ROM for normal function with self care, mobility, lifting and ambulation.      Modalities:      Charges:  Timed Code Treatment Minutes: 45   Total Treatment Minutes: 45     [] EVAL (LOW) 86758 (typically 20 minutes face-to-face)  [] EVAL (MOD) 51095 (typically 30 minutes face-to-face)  [] EVAL (HIGH) 88402 (typically 45 minutes face-to-face)  [] RE-EVAL     [x] YB(68705) x  2   [] IONTO  [x] NMR (18229) x 1    [] VASO  [] Manual (66074) x      [] Other:  [] TA x      [] Mech Traction (33065)  [] ES(attended) (47084)      [] ES (un) (25652):     GOALS:   Short Term Goals: To be achieved in: 2 weeks  1. Independent in HEP and progression per patient tolerance, in order to prevent re-injury. [x] Progressing: [] Met: [] Not Met: [] Adjusted   2. Patient will have a decrease in pain to facilitate improvement in movement, function, and ADLs as indicated by Functional Deficits. [x] Progressing: [] Met: [] Not Met: [] Adjusted    Long Term Goals: To be achieved in: 4 weeks  1. Disability index score of 20% or less for the LEFS to assist with reaching prior level of function. [x] Progressing: [] Met: [] Not Met: [] Adjusted  2. Patient will demonstrate an increase in Strength to 5/5 bilateral knee ext/hip abd to allow for proper functional mobility as indicated by patients Functional Deficits. [x] Progressing: [] Met: [] Not Met: [] Adjusted   3. Patient will return to walking up/down one flight of stairs without increased symptoms or restriction. [x] Progressing: [] Met: [] Not Met: [] Adjusted     Progression Towards Functional goals:  [] Patient is progressing as expected towards functional goals listed. [] Progression is slowed due to complexities listed. [] Progression has been slowed due to co-morbidities. [x] Plan just implemented, too soon to assess goals progression  [] Other:     ASSESSMENT:  Patient tolerated exercises well with no c/o of knee pain throughout session. Noticeable improvements in LE strength, but fatigues with glute targeted strengthening exercises.     Treatment/Activity Tolerance:  [x] Patient tolerated treatment well [] Patient limited by fatique  [] Patient limited by pain  [] Patient limited by other medical complications  [] Other:     Prognosis: [x] Good [] Fair  [] Poor    Patient Requires Follow-up: [x] Yes  [] No    PLAN: See eval  [x] Continue per plan of care [] Alter current plan (see comments)  [] Plan of care initiated [] Hold pending MD visit [] Discharge      Electronically signed by: Francisco Dejesus SPT, Bre Cruz PT, OMT-C      Note: If patient does not return for scheduled/ recommended follow up visits, this note will serve as a discharge from care along with most recent update on progress.

## 2020-12-23 ENCOUNTER — HOSPITAL ENCOUNTER (OUTPATIENT)
Dept: PHYSICAL THERAPY | Age: 60
Setting detail: THERAPIES SERIES
Discharge: HOME | End: 2020-12-23
Payer: COMMERCIAL

## 2020-12-23 PROCEDURE — 97110 THERAPEUTIC EXERCISES: CPT | Performed by: PHYSICAL THERAPIST

## 2020-12-23 PROCEDURE — 97112 NEUROMUSCULAR REEDUCATION: CPT | Performed by: PHYSICAL THERAPIST

## 2020-12-23 NOTE — FLOWSHEET NOTE
Provided verbal/tactile cueing for activities related to improving balance, coordination, kinesthetic sense, posture, motor skill, proprioception and motor activation to allow for proper function of core, proximal hip and LE with self care and ADLs  [] (44121) Gait Re-education- Provided training and instruction to the patient for proper LE, core and proximal hip recruitment and positioning and eccentric body weight control with ambulation re-education including up and down stairs     Home Exercise Program:    [x] (52397) Reviewed/Progressed HEP activities related to strengthening, flexibility, endurance, ROM of core, proximal hip and LE for functional self-care, mobility, lifting and ambulation/stair navigation   [] (23804)Reviewed/Progressed HEP activities related to improving balance, coordination, kinesthetic sense, posture, motor skill, proprioception of core, proximal hip and LE for self care, mobility, lifting, and ambulation/stair navigation      Manual Treatments:  PROM / STM / Oscillations-Mobs:  G-I, II, III, IV (PA's, Inf., Post.)  [] (92132) Provided manual therapy to mobilize LE, proximal hip and/or LS spine soft tissue/joints for the purpose of modulating pain, promoting relaxation,  increasing ROM, reducing/eliminating soft tissue swelling/inflammation/restriction, improving soft tissue extensibility and allowing for proper ROM for normal function with self care, mobility, lifting and ambulation. Modalities:      Charges:  Timed Code Treatment Minutes: 45   Total Treatment Minutes: 45     [] EVAL (LOW) 48496 (typically 20 minutes face-to-face)  [] EVAL (MOD) 10995 (typically 30 minutes face-to-face)  [] EVAL (HIGH) 51784 (typically 45 minutes face-to-face)  [] RE-EVAL     [x] AC(89384) x  2   [] IONTO  [x] NMR (54175) x 1    [] VASO  [] Manual (60755) x      [] Other:  [] TA x      [] Mech Traction (95904)  [] ES(attended) (85401)      [] ES (un) (38934):     GOALS:   Short Term Goals:  To be achieved in: 2 weeks  1. Independent in HEP and progression per patient tolerance, in order to prevent re-injury. [x] Progressing: [] Met: [] Not Met: [] Adjusted   2. Patient will have a decrease in pain to facilitate improvement in movement, function, and ADLs as indicated by Functional Deficits. [x] Progressing: [] Met: [] Not Met: [] Adjusted    Long Term Goals: To be achieved in: 4 weeks  1. Disability index score of 20% or less for the LEFS to assist with reaching prior level of function. [x] Progressing: [] Met: [] Not Met: [] Adjusted  2. Patient will demonstrate an increase in Strength to 5/5 bilateral knee ext/hip abd to allow for proper functional mobility as indicated by patients Functional Deficits. [x] Progressing: [] Met: [] Not Met: [] Adjusted   3. Patient will return to walking up/down one flight of stairs without increased symptoms or restriction. [x] Progressing: [] Met: [] Not Met: [] Adjusted     Progression Towards Functional goals:  [] Patient is progressing as expected towards functional goals listed. [] Progression is slowed due to complexities listed. [] Progression has been slowed due to co-morbidities. [x] Plan just implemented, too soon to assess goals progression  [] Other:     ASSESSMENT:  Patient tolerated new exercises well. C/o of knee pain after LSD exercise. Fatigues easily with glute targeted exercises.      Treatment/Activity Tolerance:  [x] Patient tolerated treatment well [] Patient limited by fatique  [] Patient limited by pain  [] Patient limited by other medical complications  [] Other:     Prognosis: [x] Good [] Fair  [] Poor    Patient Requires Follow-up: [x] Yes  [] No    PLAN: See eval  [x] Continue per plan of care [] Alter current plan (see comments)  [] Plan of care initiated [] Hold pending MD visit [] Discharge      Electronically signed by: Maricarmen Hartley SPT, Toño Swanson PT, OMT-C      Note: If patient does not return for scheduled/ recommended follow up visits, this note will serve as a discharge from care along with most recent update on progress.

## 2020-12-28 ENCOUNTER — HOSPITAL ENCOUNTER (OUTPATIENT)
Dept: PHYSICAL THERAPY | Age: 60
Setting detail: THERAPIES SERIES
Discharge: HOME | End: 2020-12-28
Payer: COMMERCIAL

## 2020-12-28 PROCEDURE — 97110 THERAPEUTIC EXERCISES: CPT | Performed by: PHYSICAL THERAPIST

## 2020-12-28 PROCEDURE — 97112 NEUROMUSCULAR REEDUCATION: CPT | Performed by: PHYSICAL THERAPIST

## 2020-12-28 NOTE — FLOWSHEET NOTE
[facility]- Orthopaedics and Sports Rehabilitation, [facility]       Physical Therapy Treatment Note/ Progress Report:   Date:  2020    Patient Name:  Megan Barnard    :  1960  MRN: [id number]  Restrictions/Precautions:    Medical/Treatment Diagnosis Information:  · Diagnosis: Bilateral knee pain  M25.561, M25.562  · Treatment Diagnosis: PT treatment diagnosis:  bilateral knee pain  M25.561, T92.808  Insurance/Certification information:  PT Insurance Information: [employer]   40 visits/yr, $0 copay  Physician Information:  Referring Practitioner: Dr Simone Mccormack of care signed (Y/N):     Date of Patient follow up with Physician:     Is this a Progress Report:     [x]  Yes  []  No        If Yes:  Date Range for reporting period:  Beginning 10/26/20  Ending 20    Progress report will be due (10 Rx or 30 days whichever is less):        Recertification will be due (POC Duration  / 90 days whichever is less): 21        Visit # Insurance Allowable Auth Required   7 40 []  Yes []  No        Functional Scale:  LEFS: 15%   Date assessed:   20     Latex Allergy:  [x]NO      []YES  Preferred Language for Healthcare:   [x]English       []other    Pain level:  4/10     SUBJECTIVE:   Patient states that her knees were very achy after last session.      Type: []Constant   [x]Intermitment  []Radiating []Localized  []other:     Functional Limitations: []Sitting []Standing [x]Walking    [x]Squatting [x]Stairs                []ADL's  []Driving []Sports/Recreation  []Other:      OBJECTIVE:     ROM Current (R) Current (L)   knee WNL WNL                  Strength     Knee ext 5/5 4+/5   Hip abd 4+/5 4+/5               Gait: WNL    Joint mobility:    [x]Normal    []Hypo   []Hyper    Palpation: medial, lateral patellar facets    Orthopedic Tests: n/a    RESTRICTIONS/PRECAUTIONS: OA     Exercises/Interventions:             Script-  Stretching Reps Notes/Last Progression   Held due to time constraints Airdyne     Eliptical     Hamstring Stretch: Tableside 3x30'' B manual   Piriformis Cross Over     Figure 4 Piriformis     Supine ITB  3x30\" B Manual   SKC     DKC     Adductor Stretch     Heel Prop/ Prone Hang     Wallslide     SKTC with SB     BKFO                   Exercises     Add Iso     Quad Sets         HEPSLR Ext     Clamshells 30x BRed Loop   Prone Donkey Kick     Bridge  3x10  Ball Squeeze   Ankle Theraband     BAPS     HR/TR     Single Leg Balance     Hamstring Curl 50# 2x10    Leg press  90# 30x Bilat/     MELL: ABD/ Ext 60# 30x B/ 75# 20x B    EOT Hip Ext/ Donkey Kick                  Manual      Hip Mobs with Belt     Knee Mobs/PROM Grade-     Patellar Mobs     Ankle Mobs/PROM Grade-     Access Code: [email or web address]   URL: [email or web address]   Date: 10/26/2020   Prepared by: Celia Allegra     Exercises   Seated Table Hamstring Stretch - 3 reps - 30 seconds hold - 1-2x daily - 7x weekly   Supine Active Straight Leg Raise - 10 reps - 3 sets - 2 seconds hold - 1-2x daily - 7x weekly   Sidelying Hip Abduction - 10 reps - 3 sets - 2 seconds hold - 1-2x daily - 7x weekly   Supine Short Arc Quad - 10 reps - 3 sets - 2 seconds hold - 1-2x daily - 7x weekly       Therapeutic Exercise and NMR EXR  [x] (77398) Provided verbal/tactile cueing for activities related to strengthening, flexibility, endurance, ROM for improvements in LE, proximal hip, and core control with self care, mobility, lifting, ambulation. [x] (59468) Provided verbal/tactile cueing for activities related to improving balance, coordination, kinesthetic sense, posture, motor skill, proprioception  to assist with LE, proximal hip, and core control in self care, mobility, lifting, ambulation and eccentric single leg control.      NMR and Therapeutic Activities:    [] (36378 or 01279) Provided verbal/tactile cueing for activities related to improving balance, coordination, kinesthetic sense, posture, motor skill, proprioception and motor activation to allow for proper function of core, proximal hip and LE with self care and ADLs  [] (21203) Gait Re-education- Provided training and instruction to the patient for proper LE, core and proximal hip recruitment and positioning and eccentric body weight control with ambulation re-education including up and down stairs     Home Exercise Program:    [x] (50310) Reviewed/Progressed HEP activities related to strengthening, flexibility, endurance, ROM of core, proximal hip and LE for functional self-care, mobility, lifting and ambulation/stair navigation   [] (79434)Reviewed/Progressed HEP activities related to improving balance, coordination, kinesthetic sense, posture, motor skill, proprioception of core, proximal hip and LE for self care, mobility, lifting, and ambulation/stair navigation      Manual Treatments:  PROM / STM / Oscillations-Mobs:  G-I, II, III, IV (PA's, Inf., Post.)  [] (51409) Provided manual therapy to mobilize LE, proximal hip and/or LS spine soft tissue/joints for the purpose of modulating pain, promoting relaxation,  increasing ROM, reducing/eliminating soft tissue swelling/inflammation/restriction, improving soft tissue extensibility and allowing for proper ROM for normal function with self care, mobility, lifting and ambulation. Modalities:      Charges:  Timed Code Treatment Minutes: 30   Total Treatment Minutes: 30     [] EVAL (LOW) 65045 (typically 20 minutes face-to-face)  [] EVAL (MOD) 34380 (typically 30 minutes face-to-face)  [] EVAL (HIGH) 63734 (typically 45 minutes face-to-face)  [] RE-EVAL     [x] MD(17126) x  1   [] IONTO  [x] NMR (53699) x 1    [] VASO  [] Manual (90993) x      [] Other:  [] TA x      [] Mech Traction (24744)  [] ES(attended) (03544)      [] ES (un) (50241):     GOALS:   Short Term Goals: To be achieved in: 2 weeks  1. Independent in HEP and progression per patient tolerance, in order to prevent re-injury.   [x] Progressing: [] Met: [] Not Met: [] Adjusted   2. Patient will have a decrease in pain to facilitate improvement in movement, function, and ADLs as indicated by Functional Deficits. [x] Progressing: [] Met: [] Not Met: [] Adjusted    Long Term Goals: To be achieved in: 4 weeks  1. Disability index score of 20% or less for the LEFS to assist with reaching prior level of function. [x] Progressing: [] Met: [] Not Met: [] Adjusted  2. Patient will demonstrate an increase in Strength to 5/5 bilateral knee ext/hip abd to allow for proper functional mobility as indicated by patients Functional Deficits. [x] Progressing: [] Met: [] Not Met: [] Adjusted   3. Patient will return to walking up/down one flight of stairs without increased symptoms or restriction. [x] Progressing: [] Met: [] Not Met: [] Adjusted     Progression Towards Functional goals:  [] Patient is progressing as expected towards functional goals listed. [] Progression is slowed due to complexities listed. [] Progression has been slowed due to co-morbidities. [x] Plan just implemented, too soon to assess goals progression  [] Other:     ASSESSMENT:  Patient tolerated new exercises well. Continues to make improvements in strength. No c/o of knee pain during tx session. Treatment/Activity Tolerance:  [x] Patient tolerated treatment well [] Patient limited by fatique  [] Patient limited by pain  [] Patient limited by other medical complications  [] Other:     Prognosis: [x] Good [] Fair  [] Poor    Patient Requires Follow-up: [x] Yes  [] No    PLAN: See eval  [x] Continue per plan of care [] Alter current plan (see comments)  [] Plan of care initiated [] Hold pending MD visit [] Discharge      Electronically signed by: Wolf Coughlin SPT, Naima Barrios PT, OMT-C      Note: If patient does not return for scheduled/ recommended follow up visits, this note will serve as a discharge from care along with most recent update on progress.

## 2020-12-30 ENCOUNTER — APPOINTMENT (OUTPATIENT)
Dept: PHYSICAL THERAPY | Age: 60
End: 2020-12-30
Payer: COMMERCIAL

## 2020-12-30 ENCOUNTER — HOSPITAL ENCOUNTER (OUTPATIENT)
Dept: PHYSICAL THERAPY | Age: 60
Setting detail: THERAPIES SERIES
Discharge: HOME | End: 2020-12-30
Payer: COMMERCIAL

## 2020-12-30 PROCEDURE — 97110 THERAPEUTIC EXERCISES: CPT | Performed by: PHYSICAL THERAPIST

## 2020-12-30 PROCEDURE — 97112 NEUROMUSCULAR REEDUCATION: CPT | Performed by: PHYSICAL THERAPIST

## 2020-12-30 NOTE — FLOWSHEET NOTE
[facility] Orthopaedics and Sports Rehabilitation, [facility]       Physical Therapy Treatment Note/ Progress Report:   Date:  2020    Patient Name:  Haroldo Dial    :  1960  MRN: [id number]  Restrictions/Precautions:    Medical/Treatment Diagnosis Information:  · Diagnosis: Bilateral knee pain  M25.561, M25.562  · Treatment Diagnosis: PT treatment diagnosis:  bilateral knee pain  M25.561, A63.178  Insurance/Certification information:  PT Insurance Information: [place]   40 visits/yr, $0 copay  Physician Information:  Referring Practitioner: Dr Lilian Rivero of care signed (Y/N):     Date of Patient follow up with Physician:     Is this a Progress Report:     [x]  Yes  []  No        If Yes:  Date Range for reporting period:  Beginning 10/26/20  Ending 20    Progress report will be due (10 Rx or 30 days whichever is less): 83       Recertification will be due (POC Duration  / 90 days whichever is less): 21        Visit # Insurance Allowable Auth Required   8 40 []  Yes []  No        Functional Scale:  LEFS: 15%   Date assessed:   20     Latex Allergy:  [x]NO      []YES  Preferred Language for Healthcare:   [x]English       []other    Pain level:  4/10     SUBJECTIVE:   Reports her knees have felt better over the past couple of days since the last visit.      Type: []Constant   [x]Intermitment  []Radiating []Localized  []other:     Functional Limitations: []Sitting []Standing [x]Walking    [x]Squatting [x]Stairs                []ADL's  []Driving []Sports/Recreation  []Other:      OBJECTIVE:     ROM Current (R) Current (L)   knee WNL WNL                  Strength     Knee ext 5/5 4+/5   Hip abd 4+/5 4+/5               Gait: WNL    Joint mobility:    [x]Normal    []Hypo   []Hyper    Palpation: medial, lateral patellar facets    Orthopedic Tests: n/a    RESTRICTIONS/PRECAUTIONS: OA     Exercises/Interventions:             Script-  Stretching Reps Notes/Last Progression   Held due to time constraints   Airdyne     Eliptical     Hamstring Stretch: Tableside 3x30'' B manual   Piriformis Cross Over     Figure 4 Piriformis     Supine ITB  3x30\" B Manual   SKC     DKC     Adductor Stretch     Heel Prop/ Prone Hang     Wallslide     SKTC with SB     BKFO                   Exercises     Add Iso     Quad Sets        3x20'' B static   SLR Ext     Clamshells 30x BRed Loop   Prone [employer] on SB 3x10     Ankle Theraband     BAPS     HR/TR     Single Leg Balance     Hamstring Curl 50# 3x10    Leg press  90# 30x Bilat/     MELL: ABD/ Ext 60# 30x B/ 75# 30x B    EOT Hip Ext/ Donkey Kick                  Manual      Hip Mobs with Belt     Knee Mobs/PROM Grade-     Patellar Mobs     Ankle Mobs/PROM Grade-     Access Code: [email or web address]   URL: [email or web address]   Date: 10/26/2020   Prepared by: Debra Loretto     Exercises   Seated Table Hamstring Stretch - 3 reps - 30 seconds hold - 1-2x daily - 7x weekly   Supine Active Straight Leg Raise - 10 reps - 3 sets - 2 seconds hold - 1-2x daily - 7x weekly   Sidelying Hip Abduction - 10 reps - 3 sets - 2 seconds hold - 1-2x daily - 7x weekly   Supine Short Arc Quad - 10 reps - 3 sets - 2 seconds hold - 1-2x daily - 7x weekly       Therapeutic Exercise and NMR EXR  [x] (91088) Provided verbal/tactile cueing for activities related to strengthening, flexibility, endurance, ROM for improvements in LE, proximal hip, and core control with self care, mobility, lifting, ambulation. [x] (09971) Provided verbal/tactile cueing for activities related to improving balance, coordination, kinesthetic sense, posture, motor skill, proprioception  to assist with LE, proximal hip, and core control in self care, mobility, lifting, ambulation and eccentric single leg control.      NMR and Therapeutic Activities:    [] (17982 or 73824) Provided verbal/tactile cueing for activities related to improving balance, coordination, kinesthetic sense, posture, motor skill, proprioception and motor activation to allow for proper function of core, proximal hip and LE with self care and ADLs  [] (66600) Gait Re-education- Provided training and instruction to the patient for proper LE, core and proximal hip recruitment and positioning and eccentric body weight control with ambulation re-education including up and down stairs     Home Exercise Program:    [x] (88509) Reviewed/Progressed HEP activities related to strengthening, flexibility, endurance, ROM of core, proximal hip and LE for functional self-care, mobility, lifting and ambulation/stair navigation   [] (21426)Reviewed/Progressed HEP activities related to improving balance, coordination, kinesthetic sense, posture, motor skill, proprioception of core, proximal hip and LE for self care, mobility, lifting, and ambulation/stair navigation      Manual Treatments:  PROM / STM / Oscillations-Mobs:  G-I, II, III, IV (PA's, Inf., Post.)  [] (51275) Provided manual therapy to mobilize LE, proximal hip and/or LS spine soft tissue/joints for the purpose of modulating pain, promoting relaxation,  increasing ROM, reducing/eliminating soft tissue swelling/inflammation/restriction, improving soft tissue extensibility and allowing for proper ROM for normal function with self care, mobility, lifting and ambulation. Modalities:      Charges:  Timed Code Treatment Minutes: 40   Total Treatment Minutes: 40     [] EVAL (LOW) 94260 (typically 20 minutes face-to-face)  [] EVAL (MOD) 39473 (typically 30 minutes face-to-face)  [] EVAL (HIGH) 45970 (typically 45 minutes face-to-face)  [] RE-EVAL     [x] KF(20037) x  2   [] IONTO  [x] NMR (72381) x 1    [] VASO  [] Manual (76030) x      [] Other:  [] TA x      [] Mech Traction (06776)  [] ES(attended) (44729)      [] ES (un) (39893):     GOALS:   Short Term Goals: To be achieved in: 2 weeks  1. Independent in HEP and progression per patient tolerance, in order to prevent re-injury.   [x] Progressing: [] Met: [] Not Met: [] Adjusted   2. Patient will have a decrease in pain to facilitate improvement in movement, function, and ADLs as indicated by Functional Deficits. [x] Progressing: [] Met: [] Not Met: [] Adjusted    Long Term Goals: To be achieved in: 4 weeks  1. Disability index score of 20% or less for the LEFS to assist with reaching prior level of function. [x] Progressing: [] Met: [] Not Met: [] Adjusted  2. Patient will demonstrate an increase in Strength to 5/5 bilateral knee ext/hip abd to allow for proper functional mobility as indicated by patients Functional Deficits. [x] Progressing: [] Met: [] Not Met: [] Adjusted   3. Patient will return to walking up/down one flight of stairs without increased symptoms or restriction. [x] Progressing: [] Met: [] Not Met: [] Adjusted     Progression Towards Functional goals:  [] Patient is progressing as expected towards functional goals listed. [] Progression is slowed due to complexities listed. [] Progression has been slowed due to co-morbidities. [x] Plan just implemented, too soon to assess goals progression  [] Other:     ASSESSMENT:  Tolerated progression of Rx well. No increase in symptoms noted. Treatment/Activity Tolerance:  [x] Patient tolerated treatment well [] Patient limited by fatique  [] Patient limited by pain  [] Patient limited by other medical complications  [] Other:     Prognosis: [x] Good [] Fair  [] Poor    Patient Requires Follow-up: [x] Yes  [] No    PLAN: See eval  [x] Continue per plan of care [] Alter current plan (see comments)  [] Plan of care initiated [] Hold pending MD visit [] Discharge      Electronically signed by:  Zeynep Blood PT, OMT-C      Note: If patient does not return for scheduled/ recommended follow up visits, this note will serve as a discharge from care along with most recent update on progress.

## 2021-01-04 ENCOUNTER — HOSPITAL ENCOUNTER (OUTPATIENT)
Dept: PHYSICAL THERAPY | Age: 61
Setting detail: THERAPIES SERIES
Discharge: HOME | End: 2021-01-04
Payer: COMMERCIAL

## 2021-01-04 PROCEDURE — 97112 NEUROMUSCULAR REEDUCATION: CPT | Performed by: PHYSICAL THERAPIST

## 2021-01-04 PROCEDURE — 97110 THERAPEUTIC EXERCISES: CPT | Performed by: PHYSICAL THERAPIST

## 2021-01-04 NOTE — FLOWSHEET NOTE
[facility] Orthopaedics and Sports Rehabilitation, [facility]       Physical Therapy Treatment Note/ Progress Report:   Date:  2021    Patient Name:  Tayler Akbar    :  1960  MRN: [id number]  Restrictions/Precautions:    Medical/Treatment Diagnosis Information:  · Diagnosis: Bilateral knee pain  M25.561, M25.562  · Treatment Diagnosis: PT treatment diagnosis:  bilateral knee pain  M25.561, W85.365  Insurance/Certification information:  PT Insurance Information: [place]   40 visits/yr, $0 copay  Physician Information:  Referring Practitioner: Dr Tanvir Wick of care signed (Y/N):     Date of Patient follow up with Physician:     Is this a Progress Report:     [x]  Yes  []  No        If Yes:  Date Range for reporting period:  Beginning 10/26/20  Ending 20    Progress report will be due (10 Rx or 30 days whichever is less): 99       Recertification will be due (POC Duration  / 90 days whichever is less): 21        Visit # Insurance Allowable Auth Required   9 40 []  Yes []  No        Functional Scale:  LEFS: 15%   Date assessed:   20     Latex Allergy:  [x]NO      []YES  Preferred Language for Healthcare:   [x]English       []other    Pain level:  4/10     SUBJECTIVE:   Patient states that her knees are \"finally beginning to feel better. \" Patient will be receiving bilat knee injections tomorrow (20).      Type: []Constant   [x]Intermitment  []Radiating []Localized  []other:     Functional Limitations: []Sitting []Standing [x]Walking    [x]Squatting [x]Stairs                []ADL's  []Driving []Sports/Recreation  []Other:      OBJECTIVE:     ROM Current (R) Current (L)   knee WNL WNL                  Strength     Knee ext 5/5 4+/5   Hip abd 4+/5 4+/5               Gait: WNL    Joint mobility:    [x]Normal    []Hypo   []Hyper    Palpation: medial, lateral patellar facets    Orthopedic Tests: n/a    RESTRICTIONS/PRECAUTIONS: OA     Exercises/Interventions: Script-  Stretching Reps Notes/Last Progression      Airdyne     Eliptical     Hamstring Stretch: Tableside 3x30'' B manual   Piriformis Cross Over     Figure 4 Piriformis     Supine ITB  3x30\" B Manual   SKC     DKC     Adductor Stretch     Heel Prop/ Prone Hang     Wallslide     SKTC with SB     BKFO                   Exercises     Add Iso     Quad Sets        3x20'' B static   SLR Ext     Clamshells 30x BRed Loop   Prone [employer] on SB 3x10     Ankle Theraband     BAPS     HR/TR     Lateral step up 8\" 15X Ea Stool Scoots 2 laps    Single Leg Balance     Hamstring Curl 50# 3x10    Leg press  90# 30x Bilat/     MELL: ABD/ Ext 60# 30x B/ 60# 30x B    EOT Hip Ext/ Donkey Kick                  Manual      Hip Mobs with Belt     Knee Mobs/PROM Grade-     Patellar Mobs     Ankle Mobs/PROM Grade-     Access Code: [email or web address]   URL: [email or web address]   Date: 10/26/2020   Prepared by: Jillian Burgos     Exercises   Seated Table Hamstring Stretch - 3 reps - 30 seconds hold - 1-2x daily - 7x weekly   Supine Active Straight Leg Raise - 10 reps - 3 sets - 2 seconds hold - 1-2x daily - 7x weekly   Sidelying Hip Abduction - 10 reps - 3 sets - 2 seconds hold - 1-2x daily - 7x weekly   Supine Short Arc Quad - 10 reps - 3 sets - 2 seconds hold - 1-2x daily - 7x weekly       Therapeutic Exercise and NMR EXR  [x] (32941) Provided verbal/tactile cueing for activities related to strengthening, flexibility, endurance, ROM for improvements in LE, proximal hip, and core control with self care, mobility, lifting, ambulation. [x] (01145) Provided verbal/tactile cueing for activities related to improving balance, coordination, kinesthetic sense, posture, motor skill, proprioception  to assist with LE, proximal hip, and core control in self care, mobility, lifting, ambulation and eccentric single leg control.      NMR and Therapeutic Activities:    [] (30185 or ) Provided verbal/tactile cueing for activities related to improving balance, coordination, kinesthetic sense, posture, motor skill, proprioception and motor activation to allow for proper function of core, proximal hip and LE with self care and ADLs  [] (50164) Gait Re-education- Provided training and instruction to the patient for proper LE, core and proximal hip recruitment and positioning and eccentric body weight control with ambulation re-education including up and down stairs     Home Exercise Program:    [x] (70232) Reviewed/Progressed HEP activities related to strengthening, flexibility, endurance, ROM of core, proximal hip and LE for functional self-care, mobility, lifting and ambulation/stair navigation   [] (18059)Reviewed/Progressed HEP activities related to improving balance, coordination, kinesthetic sense, posture, motor skill, proprioception of core, proximal hip and LE for self care, mobility, lifting, and ambulation/stair navigation      Manual Treatments:  PROM / STM / Oscillations-Mobs:  G-I, II, III, IV (PA's, Inf., Post.)  [] (81491) Provided manual therapy to mobilize LE, proximal hip and/or LS spine soft tissue/joints for the purpose of modulating pain, promoting relaxation,  increasing ROM, reducing/eliminating soft tissue swelling/inflammation/restriction, improving soft tissue extensibility and allowing for proper ROM for normal function with self care, mobility, lifting and ambulation. Modalities:      Charges:  Timed Code Treatment Minutes: 40   Total Treatment Minutes: 40     [] EVAL (LOW) 07401 (typically 20 minutes face-to-face)  [] EVAL (MOD) 21313 (typically 30 minutes face-to-face)  [] EVAL (HIGH) 20261 (typically 45 minutes face-to-face)  [] RE-EVAL     [x] IL(57195) x  2   [] IONTO  [x] NMR (92401) x 1    [] VASO  [] Manual (20615) x      [] Other:  [] TA x      [] Mech Traction (11450)  [] ES(attended) (09419)      [] ES (un) (57147):     GOALS:   Short Term Goals: To be achieved in: 2 weeks  1.  Independent in HEP and progression per patient tolerance, in order to prevent re-injury. [x] Progressing: [] Met: [] Not Met: [] Adjusted   2. Patient will have a decrease in pain to facilitate improvement in movement, function, and ADLs as indicated by Functional Deficits. [x] Progressing: [] Met: [] Not Met: [] Adjusted    Long Term Goals: To be achieved in: 4 weeks  1. Disability index score of 20% or less for the LEFS to assist with reaching prior level of function. [x] Progressing: [] Met: [] Not Met: [] Adjusted  2. Patient will demonstrate an increase in Strength to 5/5 bilateral knee ext/hip abd to allow for proper functional mobility as indicated by patients Functional Deficits. [x] Progressing: [] Met: [] Not Met: [] Adjusted   3. Patient will return to walking up/down one flight of stairs without increased symptoms or restriction. [x] Progressing: [] Met: [] Not Met: [] Adjusted     Progression Towards Functional goals:  [] Patient is progressing as expected towards functional goals listed. [] Progression is slowed due to complexities listed. [] Progression has been slowed due to co-morbidities. [x] Plan just implemented, too soon to assess goals progression  [] Other:     ASSESSMENT:  Patient tolerated treatment well. Noticeable improvements in bilat LE strength. C/o of \"back tightness\" at the end of treatment session.      Treatment/Activity Tolerance:  [x] Patient tolerated treatment well [] Patient limited by fatique  [] Patient limited by pain  [] Patient limited by other medical complications  [] Other:     Prognosis: [x] Good [] Fair  [] Poor    Patient Requires Follow-up: [x] Yes  [] No    PLAN: See eval  [x] Continue per plan of care [] Alter current plan (see comments)  [] Plan of care initiated [] Hold pending MD visit [] Discharge      Electronically signed by:  Gabe Toney SPT, Martha Morin PT, OMT-C      Note: If patient does not return for scheduled/ recommended follow up visits, this note will serve as a discharge from care along with most recent update on progress.

## 2021-01-05 ENCOUNTER — OFFICE VISIT (OUTPATIENT)
Dept: ORTHOPEDIC SURGERY | Age: 61
End: 2021-01-05
Payer: COMMERCIAL

## 2021-01-05 DIAGNOSIS — M17.0 PRIMARY OSTEOARTHRITIS OF BOTH KNEES: Primary | ICD-10-CM

## 2021-01-05 PROCEDURE — 20610 DRAIN/INJ JOINT/BURSA W/O US: CPT | Performed by: ORTHOPAEDIC SURGERY

## 2021-01-05 NOTE — PROGRESS NOTES
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 HISTORY OF PRESENT ILLNESS: The patient is here for the gel one injection into the left and right knee. PROCEDURE: Under sterile conditions, the patient was injected in the superolateral pouch of the left and right knee with gel one prefilled syringe with a 22-gauge needle. The injection was tolerated well. Post-injection precautions were given. PLAN: The patient will follow up with us in clinic in three months to check to see how they responded to the injections.

## 2021-01-06 ENCOUNTER — HOSPITAL ENCOUNTER (OUTPATIENT)
Dept: PHYSICAL THERAPY | Age: 61
Setting detail: THERAPIES SERIES
End: 2021-01-06
Payer: COMMERCIAL

## 2021-01-08 NOTE — TELEPHONE ENCOUNTER
01/08/2021  UPDATE:     PER MINESH @ [facility] ON 1/8/21 @ 10:48AM.  DRUG WAS SENT VIA UPS FOR DELIVERY ON 12/24/2020 TO THE [facility]

## 2021-01-11 ENCOUNTER — VIRTUAL VISIT (OUTPATIENT)
Dept: FAMILY MEDICINE CLINIC | Age: 61
End: 2021-01-11
Payer: COMMERCIAL

## 2021-01-11 ENCOUNTER — NURSE TRIAGE (OUTPATIENT)
Dept: OTHER | Facility: CLINIC | Age: 61
End: 2021-01-11

## 2021-01-11 ENCOUNTER — TELEPHONE (OUTPATIENT)
Dept: GYNECOLOGY | Age: 61
End: 2021-01-11

## 2021-01-11 DIAGNOSIS — N39.0 RECURRENT UTI: Primary | ICD-10-CM

## 2021-01-11 DIAGNOSIS — N39.41 URGE INCONTINENCE: ICD-10-CM

## 2021-01-11 DIAGNOSIS — G89.29 CHRONIC PAIN OF BOTH KNEES: ICD-10-CM

## 2021-01-11 DIAGNOSIS — M25.561 CHRONIC PAIN OF BOTH KNEES: ICD-10-CM

## 2021-01-11 DIAGNOSIS — M25.562 CHRONIC PAIN OF BOTH KNEES: ICD-10-CM

## 2021-01-11 DIAGNOSIS — N32.81 OAB (OVERACTIVE BLADDER): Primary | ICD-10-CM

## 2021-01-11 DIAGNOSIS — F32.0 CURRENT MILD EPISODE OF MAJOR DEPRESSIVE DISORDER WITHOUT PRIOR EPISODE (HCC): ICD-10-CM

## 2021-01-11 PROCEDURE — 98967 PH1 ASSMT&MGMT NQHP 11-20: CPT | Performed by: NURSE PRACTITIONER

## 2021-01-11 RX ORDER — TRAZODONE HYDROCHLORIDE 100 MG/1
TABLET ORAL
Qty: 180 TABLET | Refills: 2 | Status: SHIPPED | OUTPATIENT
Start: 2021-01-11 | End: 2022-08-17 | Stop reason: SDUPTHER

## 2021-01-11 RX ORDER — DICLOFENAC SODIUM 75 MG/1
TABLET, DELAYED RELEASE ORAL
Qty: 180 TABLET | Refills: 3 | Status: SHIPPED | OUTPATIENT
Start: 2021-01-11 | End: 2021-02-02

## 2021-01-11 ASSESSMENT — ENCOUNTER SYMPTOMS: ABDOMINAL PAIN: 0

## 2021-01-11 NOTE — PATIENT INSTRUCTIONS
Patient Education        Urge Incontinence in Women: Care Instructions  Your Care Instructions     Urge incontinence occurs when the need to urinate is so strong that you cannot reach the toilet in time, even when your bladder contains only a small amount of urine. This is also called overactive bladder or unstable bladder. Some women may have no warning before they leak urine. This condition does not cause major health problems. But it can be embarrassing and can affect a woman's self-esteem and confidence. Treatment can cure or improve your symptoms. Follow-up care is a key part of your treatment and safety. Be sure to make and go to all appointments, and call your doctor if you are having problems. It's also a good idea to know your test results and keep a list of the medicines you take. How can you care for yourself at home? · Be safe with medicines. Take your medicines exactly as prescribed. Call your doctor if you think you are having a problem with your medicine. You will get more details on the specific medicines your doctor prescribes. · Limit caffeine and alcohol. They stimulate urine production. · Urinate every 2 to 4 hours during waking hours, even if you feel that you do not have to go. · Do pelvic floor (Kegel) exercises, which tighten and strengthen pelvic muscles. To do Kegel exercises:  ? Squeeze the same muscles you would use to stop your urine. Your belly and thighs should not move. ? Hold the squeeze for 3 seconds, then relax for 3 seconds. ? Start with 3 seconds. Then add 1 second each week until you are able to squeeze for 10 seconds. ? Repeat the exercise 10 to 15 times for each session. Do three or more sessions each day. · Try wearing pads that absorb the leaks. · Keep skin in the genital area dry. When should you call for help?    Call your doctor now or seek immediate medical care if:

## 2021-01-11 NOTE — TELEPHONE ENCOUNTER
Medication:   Requested Prescriptions     Pending Prescriptions Disp Refills    traZODone (DESYREL) 100 MG tablet [Pharmacy Med Name: TRAZODONE 100 MG TABLET] 180 tablet 2     Sig: TAKE 2 TABLETS BY MOUTH EVERY NIGHT        Last Filled: 4/15/20 #180, 2 RF      Patient Phone Number: [phone] (home) [phone] (work)    Last appt: 1/11/21 Urinary Frequency   Next appt: 1/11/2021    Last OARRS:   RX Monitoring 11/15/2018   Attestation The Prescription Monitoring Report for this patient was reviewed today. Periodic Controlled Substance Monitoring Possible medication side effects, risk of tolerance/dependence & alternative treatments discussed. ;No signs of potential drug abuse or diversion identified.

## 2021-01-11 NOTE — TELEPHONE ENCOUNTER
Medication:   Requested Prescriptions     Pending Prescriptions Disp Refills    diclofenac (VOLTAREN) 75 MG EC tablet [Pharmacy Med Name: DICLOFENAC SOD EC 75 MG TAB] 180 tablet 1     Sig: TAKE 1 TABLET BY MOUTH TWICE A DAY        Last Filled:  4/29/20 #180, 1 RF     Patient Phone Number: [phone] (home) [phone] (work)    Last appt: 12/16/2020 hearing loss   Next appt: Visit date not found    Last OARRS:   RX Monitoring 11/15/2018   Attestation The Prescription Monitoring Report for this patient was reviewed today. Periodic Controlled Substance Monitoring Possible medication side effects, risk of tolerance/dependence & alternative treatments discussed. ;No signs of potential drug abuse or diversion identified.

## 2021-01-11 NOTE — TELEPHONE ENCOUNTER
Reason for Disposition   MODERATE-SEVERE itching (i.e., interferes with school, work, or sleep)    Answer Assessment - Initial Assessment Questions  1. SYMPTOM: \"What's the main symptom you're concerned about? \" (e.g., frequency, incontinence)      Incontinence, urgency for the past few weeks, states vaginal burning and itching x 2 day    2. ONSET: \"When did the  Above symptoms start  start? \"      Urinary symptoms started a few weeks ago and worse in the past 2 days     3. PAIN: \"Is there any pain? \" If so, ask: \"How bad is it? \" (Scale: 1-10; mild, moderate, severe)      No     4. CAUSE: \"What do you think is causing the symptoms? \"      Vaginal burning has occurred before, possible yeast infection    5. OTHER SYMPTOMS: \"Do you have any other symptoms? \" (e.g., fever, flank pain, blood in urine, pain with urination)      No    6. PREGNANCY: \"Is there any chance you are pregnant? \" \"When was your last menstrual period? \"      N/a menopause, and hysterectomy    Answer Assessment - Initial Assessment Questions  1. SYMPTOM: \"What's the main symptom you're concerned about? \" (e.g., pain, itching, dryness)      Burning and itching    2. LOCATION: \"Where is the  burning located? \" (e.g., inside/outside, left/right)      Right at the opening     3. ONSET: \"When did the  Burning   start? \"      2 days ago    4. PAIN: \"Is there any pain? \" If so, ask: \"How bad is it? \" (Scale: 1-10; mild, moderate, severe)      No    5. ITCHING: \"Is there any itching? \" If so, ask: \"How bad is it? \" (Scale: 1-10; mild, moderate, severe)      Yes moderate    6. CAUSE: \"What do you think is causing the discharge? \" \"Have you had the same problem before? What happened then? \"      Yeast    7. OTHER SYMPTOMS: \"Do you have any other symptoms? \" (e.g., fever, itching, vaginal bleeding, pain with urination, injury to genital area, vaginal foreign body)      No    8. PREGNANCY: \"Is there any chance you are pregnant? \" \"When was your last menstrual period? \"

## 2021-01-11 NOTE — TELEPHONE ENCOUNTER
Patient is scheduled for an appointment tomorrow. Believe she has UTI. She stated that Dr Gloria Mandujano once gave her a referral for this problem and she is requesting that information again. Says she can't work or function at the moment.  Contact number is [phone]

## 2021-01-11 NOTE — PROGRESS NOTES
2021      TELEHEALTH EVALUATION -- Telephone (During XQVWA-60 public health emergency)    HPI:    Sherry Ricks (:  1960) has requested a telephone evaluation for the following concern(s):    Sensation of urinary frequency and urgency. Present for several years. Worsening in past several months. States will feel urge to urinate and must go immediately. This morning associated with urinary incontinence. States was not able to hold urine to make it to bathroom and wet self. Denies vaginal protrusion. Denies abd pain, hematuria, dysuria or stress incontinence. States when 30 yo had vaginal protrusion and surgery to correct it- not sure what was done, not sure if sling was placed. No previous medication. Denies fevers or fatigue. No flank pain. Review of Systems   Constitutional: Negative for fever. Gastrointestinal: Negative for abdominal pain. Genitourinary: Positive for urgency (with incontinence). Negative for dysuria, flank pain, frequency and hematuria. Prior to Visit Medications    Medication Sig Taking? Authorizing Provider   diclofenac (VOLTAREN) 75 MG EC tablet TAKE 1 TABLET BY MOUTH TWICE A DAY Yes LANCE Delaney CNP   mirabegron (MYRBETRIQ) 25 MG TB24 Take 1 tablet by mouth daily Yes LANCE Delaney CNP   Estradiol (YUVAFEM) 10 MCG TABS vaginal tablet Place 1 tablet vaginally Twice a Week Yes Carmen Fry MD   buPROPion (WELLBUTRIN XL) 300 MG extended release tablet TAKE 1 TABLET BY MOUTH EVERY DAY IN THE MORNING Yes LANCE Delaney CNP   pregabalin (LYRICA) 200 MG capsule Take 1 capsule by mouth daily for 180 days.  Yes LANCE Delaney CNP   esomeprazole (NEXIUM) 40 MG delayed release capsule Take 1 capsule by mouth every morning (before breakfast) Yes LANCE Delaney CNP   traZODone (DESYREL) 100 MG tablet TAKE 2 TABLETS BY MOUTH EVERY DAY AT NIGHT Yes LANCE Delaney CNP predniSONE (DELTASONE) 10 MG tablet 4 po daily for 3 days, 3 for 3 days, 2 for 3 days, 1 for 3 days, then stop Yes LANCE Bruce CNP   Omega-3 Fatty Acids (FISH OIL PO) Take by mouth Yes Historical Provider, MD   Cholecalciferol (VITAMIN D PO) Take by mouth Yes Historical Provider, MD   pregabalin (LYRICA) 100 MG capsule Take 1 capsule by mouth daily for 90 days.   LANCE Brcue CNP       Past Medical History:   Diagnosis Date    Alcoholism [facility])     Recovering    Chronic left-sided low back pain without sciatica 5/26/2020    Degenerative joint disease     Depression     Fibrocystic disease of breast 6/15/2010    GERD (gastroesophageal reflux disease)     Headache(784.0)     Hemorrhoid 6/15/2010    Hyperlipidemia 6/25/2019    Mitral valve regurgitation     Osteoporosis     Screening mammogram, encounter for 02/04/2018    Dr Reg Pichrado MD    Urinary incontinence     Vitamin D deficiency 3/16/2012       Past Surgical History:   Procedure Laterality Date    BLADDER SURGERY      with hysterectomy-vaginal approach    BREAST SURGERY  2006    benign cyst on left    COLONOSCOPY  11/30/2012    Dr Yana Briseno      facial 12.11    FACIAL COSMETIC SURGERY      HYSTERECTOMY  1996    SALPINGO-OOPHORECTOMY      still with left ovary       Family History   Problem Relation Age of Onset    High Blood Pressure Mother     High Cholesterol Mother     High Blood Pressure Father     High Cholesterol Father     Arthritis Father     Arthritis Paternal Grandmother     Rheum Arthritis Neg Hx     Osteoarthritis Neg Hx     Asthma Neg Hx     Breast Cancer Neg Hx     Cancer Neg Hx     Diabetes Neg Hx     Heart Failure Neg Hx     Hypertension Neg Hx     Migraines Neg Hx     Ovarian Cancer Neg Hx     Rashes/Skin Problems Neg Hx     Seizures Neg Hx     Stroke Neg Hx     Thyroid Disease Neg Hx        No Known Allergies    Social History     Tobacco Use  Smoking status: Never Smoker    Smokeless tobacco: Never Used   Substance Use Topics    Alcohol use: No     Alcohol/week: 0.0 standard drinks    Drug use: No          PHYSICAL EXAMINATION:  N/A            ASSESSMENT/PLAN:  1. OAB (overactive bladder)  New problem  Trial- mirabegron (MYRBETRIQ) 25 MG TB24; Take 1 tablet by mouth daily  Dispense: 30 tablet; Refill: 5  - MIYA - Zully Hastings MD, [facility]  Also recommend Kegel exercises    2. Urge incontinence  New problem  Will trial Ditropan if Myrbetriq not covered by insurance  - mirabegron (MYRBETRIQ) 25 MG TB24; Take 1 tablet by mouth daily  Dispense: 30 tablet; Refill: [place]  - MIYA Hastings MD, [facility]      Return in about 4 weeks (around 2/8/2021) for OAB. Cam Patel is a 62 y.o. female being evaluated by a Telephone Visit encounter to address concerns as mentioned above. A caregiver was present when appropriate. Due to this being a Telephone encounter (During [facility] emergency), a physical examination is not performed. Pursuant to the emergency declaration under the [address] [place] [address], [address] [place] [address] authority and the [employer] Act, this Virtual Visit was conducted with patient's (and/or legal guardian's) consent, to reduce the patient's risk of exposure to COVID-19 and provide necessary medical care. The patient (and/or legal guardian) has also been advised to contact this office for worsening conditions or problems, and seek emergency medical treatment and/or call 911 if deemed necessary. Patient identification was verified at the start of the visit: Yes    Total time spent on this encounter: 15  minutes    Services were provided through a telephone discussion virtually to substitute for in-person clinic visit. Patient and provider were located at their individual homes. --Oleg Montano, APRN - CNP on 1/11/2021 at 1:22 PM    An electronic signature was used to authenticate this note. Sanaz Avitia

## 2021-01-19 ENCOUNTER — VIRTUAL VISIT (OUTPATIENT)
Dept: FAMILY MEDICINE CLINIC | Age: 61
End: 2021-01-19
Payer: COMMERCIAL

## 2021-01-19 ENCOUNTER — TELEPHONE (OUTPATIENT)
Dept: FAMILY MEDICINE CLINIC | Age: 61
End: 2021-01-19

## 2021-01-19 DIAGNOSIS — F32.0 CURRENT MILD EPISODE OF MAJOR DEPRESSIVE DISORDER WITHOUT PRIOR EPISODE (HCC): ICD-10-CM

## 2021-01-19 DIAGNOSIS — G89.29 CHRONIC PAIN OF BOTH KNEES: ICD-10-CM

## 2021-01-19 DIAGNOSIS — N30.00 ACUTE CYSTITIS WITHOUT HEMATURIA: ICD-10-CM

## 2021-01-19 DIAGNOSIS — M25.561 CHRONIC PAIN OF BOTH KNEES: ICD-10-CM

## 2021-01-19 DIAGNOSIS — M25.562 CHRONIC PAIN OF BOTH KNEES: ICD-10-CM

## 2021-01-19 DIAGNOSIS — Z90.49 STATUS POST APPENDECTOMY: ICD-10-CM

## 2021-01-19 DIAGNOSIS — Z09 HOSPITAL DISCHARGE FOLLOW-UP: ICD-10-CM

## 2021-01-19 DIAGNOSIS — I21.4 NSTEMI (NON-ST ELEVATED MYOCARDIAL INFARCTION) (HCC): Primary | ICD-10-CM

## 2021-01-19 PROCEDURE — 98968 PH1 ASSMT&MGMT NQHP 21-30: CPT | Performed by: NURSE PRACTITIONER

## 2021-01-19 RX ORDER — LISINOPRIL 2.5 MG/1
2.5 TABLET ORAL NIGHTLY
COMMUNITY
Start: 2021-01-14 | End: 2021-02-13

## 2021-01-19 RX ORDER — BUPROPION HYDROCHLORIDE 450 MG/1
450 TABLET, FILM COATED, EXTENDED RELEASE ORAL EVERY MORNING
Qty: 90 TABLET | Refills: 1 | Status: SHIPPED | OUTPATIENT
Start: 2021-01-19 | End: 2021-01-19

## 2021-01-19 RX ORDER — BUPROPION HYDROCHLORIDE 150 MG/1
150 TABLET ORAL EVERY MORNING
Qty: 90 TABLET | Refills: 1 | Status: SHIPPED | OUTPATIENT
Start: 2021-01-19 | End: 2021-05-27

## 2021-01-19 RX ORDER — BUPROPION HYDROCHLORIDE 300 MG/1
300 TABLET ORAL EVERY MORNING
Qty: 90 TABLET | Refills: 1 | Status: SHIPPED | OUTPATIENT
Start: 2021-01-19 | End: 2021-06-14 | Stop reason: SDUPTHER

## 2021-01-19 RX ORDER — ASPIRIN 81 MG/1
81 TABLET, CHEWABLE ORAL DAILY
COMMUNITY
Start: 2021-01-19 | End: 2021-09-09

## 2021-01-19 RX ORDER — PREGABALIN 100 MG/1
100 CAPSULE ORAL DAILY
Qty: 90 CAPSULE | Refills: 1 | Status: SHIPPED | OUTPATIENT
Start: 2021-01-19 | End: 2021-09-16

## 2021-01-19 ASSESSMENT — ENCOUNTER SYMPTOMS
CONSTIPATION: 0
ABDOMINAL PAIN: 1
CHEST TIGHTNESS: 0
SHORTNESS OF BREATH: 0
VOMITING: 0
DIARRHEA: 1
NAUSEA: 0
BACK PAIN: 0

## 2021-01-19 NOTE — TELEPHONE ENCOUNTER
Pharmacy states that the Rx for buPROPion (WELLBUTRIN XL) 450 MG extended release tablet that was just sent in is not covered. Can they fill 300 MG and 150MG tablets? Please send new prescription if appropriate. ,

## 2021-01-19 NOTE — PROGRESS NOTES
2021      TELEHEALTH EVALUATION -- Telephone (During BDNJL-85 public health emergency)    HPI:    Trinidad Dakins (:  1960) has requested a telephone evaluation for the following concern(s):    Admitted [facility] - for abd pain. (+) lap appendectomy. Was also advised Troponin elevated. Concern for silent MI. Echo completed and EF: 40-45%. Pt is to schedule outpatient cardiac cath- states working on that today. Started on Lisinopril 2.5 mg, ASA 81 mg & NTG SL. Has not needed NTG. Not having chest pain- will occasionally experience twinges of pain in chest, no associated symptoms. Has follow up with surgeon 21 for post op follow up. States pain is slowly improving. Only taking Percocet prn. Still struggling with stool incontinence. Was also diagnosed with UTI- taking Augmentin- tolerating well, will finish tomorrow. Denies dysuria, hematuria or fevers. Chronic knee pain: improving after receiving Gel-one injections. Would like to decreased lyrica to 100 mg daily. Managed by Dr Jaron Cardenas    Depression: would like to increase Welbutrin to 450 mg. States with recent hospitalization increased anxiety and depression. She tolerates well, taking daily. Review of Systems   Constitutional: Positive for fatigue. Negative for chills and fever. Respiratory: Negative for chest tightness and shortness of breath. Cardiovascular: Negative for chest pain (twinges of pain in chest), palpitations and leg swelling. Gastrointestinal: Positive for abdominal pain (generalized) and diarrhea (with stool incontinence). Negative for constipation, nausea and vomiting. Genitourinary: Negative for dysuria, flank pain, frequency, hematuria and urgency. Musculoskeletal: Positive for arthralgias (B knee pain- improving). Negative for back pain. Psychiatric/Behavioral: Negative for agitation and sleep disturbance. The patient is nervous/anxious.          Prior to Visit Medications Medication Sig Taking?  Authorizing Provider   lisinopril (PRINIVIL;ZESTRIL) 2.5 MG tablet Take 2.5 mg by mouth nightly Yes Historical Provider, MD   aspirin 81 MG chewable tablet Take 81 mg by mouth daily Yes Historical Provider, MD   traZODone (DESYREL) 100 MG tablet TAKE 2 TABLETS BY MOUTH EVERY NIGHT Yes LANCE Lagunas CNP   diclofenac (VOLTAREN) 75 MG EC tablet TAKE 1 TABLET BY MOUTH TWICE A DAY Yes LANCE Lagunas CNP   mirabegron (MYRBETRIQ) 25 MG TB24 Take 1 tablet by mouth daily Yes LANCE Lagunas CNP   Estradiol (YUVAFEM) 10 MCG TABS vaginal tablet Place 1 tablet vaginally Twice a Week Yes Lali Griffith MD   buPROPion (WELLBUTRIN XL) 300 MG extended release tablet TAKE 1 TABLET BY MOUTH EVERY DAY IN THE MORNING Yes LANCE Lagunas CNP   esomeprazole (NEXIUM) 40 MG delayed release capsule Take 1 capsule by mouth every morning (before breakfast) Yes LANCE Lagunas CNP   Omega-3 Fatty Acids (FISH OIL PO) Take by mouth Yes Historical Provider, MD   Cholecalciferol (VITAMIN D PO) Take by mouth Yes Historical Provider, MD       Past Medical History:   Diagnosis Date    Alcoholism ([address]     Recovering    Chronic left-sided low back pain without sciatica 5/26/2020    Degenerative joint disease     Depression     Fibrocystic disease of breast 6/15/2010    GERD (gastroesophageal reflux disease)     Headache(784.0)     Hemorrhoid 6/15/2010    Hyperlipidemia 6/25/2019    Mitral valve regurgitation     Osteoporosis     Screening mammogram, encounter for 02/04/2018    Dr Clotilde Castaneda MD    Urinary incontinence     Vitamin D deficiency 3/16/2012       Past Surgical History:   Procedure Laterality Date    APPENDECTOMY  01/13/2021    BLADDER SURGERY      with hysterectomy-vaginal approach    BREAST SURGERY  2006    benign cyst on left    COLONOSCOPY  11/30/2012    Dr Roxanne Araujo      facial 12.11    FACIAL COSMETIC SURGERY  HYSTERECTOMY  1996    SALPINGO-OOPHORECTOMY      still with left ovary       Family History   Problem Relation Age of Onset    High Blood Pressure Mother     High Cholesterol Mother     High Blood Pressure Father     High Cholesterol Father     Arthritis Father     Arthritis Paternal Grandmother     Rheum Arthritis Neg Hx     Osteoarthritis Neg Hx     Asthma Neg Hx     Breast Cancer Neg Hx     Cancer Neg Hx     Diabetes Neg Hx     Heart Failure Neg Hx     Hypertension Neg Hx     Migraines Neg Hx     Ovarian Cancer Neg Hx     Rashes/Skin Problems Neg Hx     Seizures Neg Hx     Stroke Neg Hx     Thyroid Disease Neg Hx        No Known Allergies    Social History     Tobacco Use    Smoking status: Never Smoker    Smokeless tobacco: Never Used   Substance Use Topics    Alcohol use: No     Alcohol/week: 0.0 standard drinks    Drug use: No          PHYSICAL EXAMINATION:  N/A            ASSESSMENT/PLAN:  1. NSTEMI (non-ST elevated myocardial infarction) ([facility] [address]  New problem  Schedule outpatient cath  Will consider repeat echo in 3-6 months, pending cardiology work up  Criteria for emergent care reviewed    2. Status post appendectomy  New problem  Improving  Keep upcoming appt with  Gen Surg 1/26    3. Acute cystitis without hematuria  Improving  Complete Augmentin    4. Chronic pain of both knees  Improving  Continue treatment plan per Dr Marisa Abdi    5. Current mild episode of major depressive disorder without prior episode (HCC)  Stable  Increase Wellbutrin Xl to 450 mg dialy    6. Hospital discharge follow-up  Records reviewed through [facility]      Soren in about 2 months (around 3/19/2021) for annual physical with fasting labs. Trinidad Dakins is a 62 y.o. female being evaluated by a Telephone Visit encounter to address concerns as mentioned above. A caregiver was present when appropriate. Due to this being a Telephone encounter (During [address] emergency), a physical examination is not performed. Pursuant to the emergency declaration under the [address] [place] [address], [address] [place] [address] and the [employer] Act, this Virtual Visit was conducted with patient's (and/or legal guardian's) consent, to reduce the patient's risk of exposure to COVID-19 and provide necessary medical care. The patient (and/or legal guardian) has also been advised to contact this office for worsening conditions or problems, and seek emergency medical treatment and/or call 911 if deemed necessary. Patient identification was verified at the start of the visit: Yes    Total time spent on this encounter: 26 minutes    Services were provided through a telephone discussion virtually to substitute for in-person clinic visit. Patient and provider were located at their individual homes. --LANCE Torres CNP on 1/19/2021 at 12:33 PM    An electronic signature was used to authenticate this note. Yasmin Hahn

## 2021-01-20 ENCOUNTER — VIRTUAL VISIT (OUTPATIENT)
Dept: FAMILY MEDICINE CLINIC | Age: 61
End: 2021-01-20
Payer: COMMERCIAL

## 2021-01-20 DIAGNOSIS — R19.7 DIARRHEA, UNSPECIFIED TYPE: Primary | ICD-10-CM

## 2021-01-20 PROCEDURE — 99213 OFFICE O/P EST LOW 20 MIN: CPT | Performed by: NURSE PRACTITIONER

## 2021-01-20 ASSESSMENT — ENCOUNTER SYMPTOMS
NAUSEA: 0
BLOOD IN STOOL: 0
ANAL BLEEDING: 0
ABDOMINAL DISTENTION: 0
CONSTIPATION: 0
VOMITING: 0
ABDOMINAL PAIN: 1
RECTAL PAIN: 0
CHEST TIGHTNESS: 0
BLOATING: 1
WHEEZING: 0
SHORTNESS OF BREATH: 0
COUGH: 0
DIARRHEA: 1

## 2021-01-21 ENCOUNTER — TELEPHONE (OUTPATIENT)
Dept: FAMILY MEDICINE CLINIC | Age: 61
End: 2021-01-21

## 2021-01-21 ENCOUNTER — HOSPITAL ENCOUNTER (OUTPATIENT)
Age: 61
Discharge: HOME | End: 2021-01-21
Payer: COMMERCIAL

## 2021-01-21 LAB
REASON FOR REJECTION: NORMAL
REJECTED TEST: NORMAL

## 2021-01-21 NOTE — TELEPHONE ENCOUNTER
Pt dropped off stool sample this morning at [facility] (c-diff). Pt is asking for results.

## 2021-01-21 NOTE — TELEPHONE ENCOUNTER
Patient is dropping off stool sample , now,at [facility] as directed by [facility] patient w/results

## 2021-01-22 NOTE — RESULT ENCOUNTER NOTE
Please call patient and let her know that the lab can not run a c-diff test on stool that is formed. It has to be liquid stool. And likely she does not have c-diff if she is not having loose, watery stools.

## 2021-01-25 ENCOUNTER — APPOINTMENT (OUTPATIENT)
Dept: PHYSICAL THERAPY | Age: 61
End: 2021-01-25
Payer: COMMERCIAL

## 2021-01-27 ENCOUNTER — APPOINTMENT (OUTPATIENT)
Dept: PHYSICAL THERAPY | Age: 61
End: 2021-01-27
Payer: COMMERCIAL

## 2021-02-01 NOTE — TELEPHONE ENCOUNTER
C diff test was not performed d/t pt not having diarrhea.  Sample was firmed stool, therefore they don't run test.

## 2021-02-02 ENCOUNTER — OFFICE VISIT (OUTPATIENT)
Dept: FAMILY MEDICINE CLINIC | Age: 61
End: 2021-02-02
Payer: COMMERCIAL

## 2021-02-02 VITALS
OXYGEN SATURATION: 98 % | HEART RATE: 90 BPM | BODY MASS INDEX: 22.01 KG/M2 | SYSTOLIC BLOOD PRESSURE: 102 MMHG | DIASTOLIC BLOOD PRESSURE: 64 MMHG | WEIGHT: 153.4 LBS

## 2021-02-02 DIAGNOSIS — R10.9 ABDOMINAL PAIN, UNSPECIFIED ABDOMINAL LOCATION: Primary | ICD-10-CM

## 2021-02-02 DIAGNOSIS — F51.01 PRIMARY INSOMNIA: ICD-10-CM

## 2021-02-02 DIAGNOSIS — I21.4 NSTEMI (NON-ST ELEVATED MYOCARDIAL INFARCTION) (HCC): ICD-10-CM

## 2021-02-02 DIAGNOSIS — I95.9 HYPOTENSION, UNSPECIFIED HYPOTENSION TYPE: ICD-10-CM

## 2021-02-02 PROCEDURE — 99214 OFFICE O/P EST MOD 30 MIN: CPT | Performed by: NURSE PRACTITIONER

## 2021-02-02 ASSESSMENT — ENCOUNTER SYMPTOMS
NAUSEA: 0
SHORTNESS OF BREATH: 0
ABDOMINAL PAIN: 1
VOMITING: 0
CONSTIPATION: 0
DIARRHEA: 0
BACK PAIN: 0

## 2021-02-02 NOTE — PATIENT INSTRUCTIONS
Hold Lisinopril 2.5 mg if blood pressure is less than 95/60, or if any lightheadedness, dizziness or fatigue    Can take 1.5 tablets of Trazodone to help with sleep. If no improvement, can increase to 2 tabs after 3-4 days      Patient Education        Abdominal Pain: Care Instructions  Your Care Instructions     Abdominal pain has many possible causes. Some aren't serious and get better on their own in a few days. Others need more testing and treatment. If your pain continues or gets worse, you need to be rechecked and may need more tests to find out what is wrong. You may need surgery to correct the problem. Don't ignore new symptoms, such as fever, nausea and vomiting, urination problems, pain that gets worse, and dizziness. These may be signs of a more serious problem. Your doctor may have recommended a follow-up visit in the next 8 to 12 hours. If you are not getting better, you may need more tests or treatment. The doctor has checked you carefully, but problems can develop later. If you notice any problems or new symptoms, get medical treatment right away. Follow-up care is a key part of your treatment and safety. Be sure to make and go to all appointments, and call your doctor if you are having problems. It's also a good idea to know your test results and keep a list of the medicines you take. How can you care for yourself at home? · Rest until you feel better. · To prevent dehydration, drink plenty of fluids, enough so that your urine is light yellow or clear like water. Choose water and other caffeine-free clear liquids until you feel better. If you have kidney, heart, or liver disease and have to limit fluids, talk with your doctor before you increase the amount of fluids you drink. · If your stomach is upset, eat mild foods, such as rice, dry toast or crackers, bananas, and applesauce. Try eating several small meals instead of two or three large ones.   · Wait until 48 hours after all symptoms have gone away before you have spicy foods, alcohol, and drinks that contain caffeine. · Do not eat foods that are high in fat. · Avoid anti-inflammatory medicines such as aspirin, ibuprofen (Advil, Motrin), and naproxen (Aleve). These can cause stomach upset. Talk to your doctor if you take daily aspirin for another health problem. When should you call for help? Call 911 anytime you think you may need emergency care. For example, call if:    · You passed out (lost consciousness).     · You pass maroon or very bloody stools.     · You vomit blood or what looks like coffee grounds.     · You have new, severe belly pain. Call your doctor now or seek immediate medical care if:    · Your pain gets worse, especially if it becomes focused in one area of your belly.     · You have a new or higher fever.     · Your stools are black and look like tar, or they have streaks of blood.     · You have unexpected vaginal bleeding.     · You have symptoms of a urinary tract infection. These may include:  ? Pain when you urinate. ? Urinating more often than usual.  ? Blood in your urine.     · You are dizzy or lightheaded, or you feel like you may faint. Watch closely for changes in your health, and be sure to contact your doctor if:    · You are not getting better after 1 day (24 hours). Where can you learn more? Go to [email or web address] and sign in to your [employer] account. Enter N111 in the Vocollect box to learn more about \"Abdominal Pain: Care Instructions. \"     If you do not have an account, please click on the \"Sign Up Now\" link. Current as of: June 26, 2019               Content Version: 12.6  © 0373-1199 [employer]. Care instructions adapted under license by [facility] ([facility]).  If you have questions about a medical condition or this instruction, always ask your healthcare professional. [address] any warranty or liability for your use of this information.

## 2021-04-21 RX ORDER — CIPROFLOXACIN 500 MG/1
500 TABLET, FILM COATED ORAL 2 TIMES DAILY
Qty: 6 TABLET | Refills: 0 | Status: SHIPPED | OUTPATIENT
Start: 2021-04-21 | End: 2021-04-24

## 2021-04-28 ENCOUNTER — TELEPHONE (OUTPATIENT)
Dept: FAMILY MEDICINE CLINIC | Age: 61
End: 2021-04-28

## 2021-05-27 RX ORDER — BUPROPION HYDROCHLORIDE 150 MG/1
TABLET ORAL
Qty: 90 TABLET | Refills: 1 | Status: SHIPPED | OUTPATIENT
Start: 2021-05-27 | End: 2021-09-09

## 2021-05-27 NOTE — TELEPHONE ENCOUNTER
Medication:   Requested Prescriptions     Pending Prescriptions Disp Refills    buPROPion (WELLBUTRIN XL) 150 MG extended release tablet [Pharmacy Med Name: BUPROPION HCL  MG TABLET] 90 tablet 1     Sig: TAKE 1 TABLET BY MOUTH EVERY DAY IN THE MORNING        Last Filled:  1/19/21 #90, 1 RF     Patient Phone Number: [phone] (home) [phone] (work)    Last appt: 2/2/2021 GI problems    Next appt: Visit date not found    Last OARRS:   RX Monitoring 11/15/2018   Attestation The Prescription Monitoring Report for this patient was reviewed today. Periodic Controlled Substance Monitoring Possible medication side effects, risk of tolerance/dependence & alternative treatments discussed. ;No signs of potential drug abuse or diversion identified.

## 2021-06-14 ENCOUNTER — OFFICE VISIT (OUTPATIENT)
Dept: FAMILY MEDICINE CLINIC | Age: 61
End: 2021-06-14
Payer: COMMERCIAL

## 2021-06-14 VITALS
SYSTOLIC BLOOD PRESSURE: 104 MMHG | HEIGHT: 70 IN | HEART RATE: 78 BPM | OXYGEN SATURATION: 97 % | WEIGHT: 153.6 LBS | DIASTOLIC BLOOD PRESSURE: 62 MMHG | BODY MASS INDEX: 21.99 KG/M2

## 2021-06-14 DIAGNOSIS — F32.0 CURRENT MILD EPISODE OF MAJOR DEPRESSIVE DISORDER WITHOUT PRIOR EPISODE (HCC): ICD-10-CM

## 2021-06-14 DIAGNOSIS — E78.5 HYPERLIPIDEMIA, UNSPECIFIED HYPERLIPIDEMIA TYPE: ICD-10-CM

## 2021-06-14 DIAGNOSIS — I42.9 SECONDARY CARDIOMYOPATHY (HCC): ICD-10-CM

## 2021-06-14 DIAGNOSIS — Z00.00 WELL ADULT EXAM: Primary | ICD-10-CM

## 2021-06-14 DIAGNOSIS — Z00.00 WELL ADULT EXAM: ICD-10-CM

## 2021-06-14 DIAGNOSIS — N39.41 URGE INCONTINENCE OF URINE: ICD-10-CM

## 2021-06-14 DIAGNOSIS — K21.9 GASTROESOPHAGEAL REFLUX DISEASE, UNSPECIFIED WHETHER ESOPHAGITIS PRESENT: ICD-10-CM

## 2021-06-14 DIAGNOSIS — Z23 NEED FOR SHINGLES VACCINE: ICD-10-CM

## 2021-06-14 PROBLEM — Z86.010 HISTORY OF COLONIC POLYPS: Status: ACTIVE | Noted: 2021-06-14

## 2021-06-14 PROBLEM — Z86.0100 HISTORY OF COLONIC POLYPS: Status: ACTIVE | Noted: 2021-06-14

## 2021-06-14 LAB
ANION GAP SERPL CALCULATED.3IONS-SCNC: 10 MMOL/L (ref 3–16)
BASOPHILS ABSOLUTE: 0.1 K/UL (ref 0–0.2)
BASOPHILS RELATIVE PERCENT: 1 %
BILIRUBIN, POC: NORMAL
BLOOD URINE, POC: NORMAL
BUN BLDV-MCNC: 13 MG/DL (ref 7–20)
CALCIUM SERPL-MCNC: 9.3 MG/DL (ref 8.3–10.6)
CHLORIDE BLD-SCNC: 102 MMOL/L (ref 99–110)
CLARITY, POC: NORMAL
CO2: 28 MMOL/L (ref 21–32)
COLOR, POC: YELLOW
CREAT SERPL-MCNC: 0.8 MG/DL (ref 0.6–1.2)
EOSINOPHILS ABSOLUTE: 0.2 K/UL (ref 0–0.6)
EOSINOPHILS RELATIVE PERCENT: 3.5 %
GFR AFRICAN AMERICAN: >60
GFR NON-AFRICAN AMERICAN: >60
GLUCOSE BLD-MCNC: 81 MG/DL (ref 70–99)
GLUCOSE URINE, POC: NORMAL
HCT VFR BLD CALC: 37.8 % (ref 36–48)
HEMOGLOBIN: 13.1 G/DL (ref 12–16)
KETONES, POC: NORMAL
LEUKOCYTE EST, POC: NORMAL
LYMPHOCYTES ABSOLUTE: 1.5 K/UL (ref 1–5.1)
LYMPHOCYTES RELATIVE PERCENT: 30.7 %
MCH RBC QN AUTO: 30.8 PG (ref 26–34)
MCHC RBC AUTO-ENTMCNC: 34.8 G/DL (ref 31–36)
MCV RBC AUTO: 88.4 FL (ref 80–100)
MONOCYTES ABSOLUTE: 0.5 K/UL (ref 0–1.3)
MONOCYTES RELATIVE PERCENT: 9.8 %
NEUTROPHILS ABSOLUTE: 2.8 K/UL (ref 1.7–7.7)
NEUTROPHILS RELATIVE PERCENT: 55 %
NITRITE, POC: NORMAL
PDW BLD-RTO: 12.5 % (ref 12.4–15.4)
PH, POC: 5
PLATELET # BLD: 266 K/UL (ref 135–450)
PMV BLD AUTO: 9 FL (ref 5–10.5)
POTASSIUM SERPL-SCNC: 4.8 MMOL/L (ref 3.5–5.1)
PROTEIN, POC: NORMAL
RBC # BLD: 4.27 M/UL (ref 4–5.2)
SODIUM BLD-SCNC: 140 MMOL/L (ref 136–145)
SPECIFIC GRAVITY, POC: 1.01
TSH REFLEX FT4: 1.99 UIU/ML (ref 0.27–4.2)
UROBILINOGEN, POC: 0.2
WBC # BLD: 5 K/UL (ref 4–11)

## 2021-06-14 PROCEDURE — 99213 OFFICE O/P EST LOW 20 MIN: CPT | Performed by: NURSE PRACTITIONER

## 2021-06-14 PROCEDURE — 90471 IMMUNIZATION ADMIN: CPT | Performed by: NURSE PRACTITIONER

## 2021-06-14 PROCEDURE — 99396 PREV VISIT EST AGE 40-64: CPT | Performed by: NURSE PRACTITIONER

## 2021-06-14 PROCEDURE — 90750 HZV VACC RECOMBINANT IM: CPT | Performed by: NURSE PRACTITIONER

## 2021-06-14 PROCEDURE — 81002 URINALYSIS NONAUTO W/O SCOPE: CPT | Performed by: NURSE PRACTITIONER

## 2021-06-14 RX ORDER — VALSARTAN 80 MG/1
80 TABLET ORAL DAILY
Qty: 30 TABLET | Refills: 3 | COMMUNITY
Start: 2021-06-14

## 2021-06-14 RX ORDER — BUPROPION HYDROCHLORIDE 300 MG/1
300 TABLET ORAL EVERY MORNING
Qty: 90 TABLET | Refills: 1 | Status: SHIPPED | OUTPATIENT
Start: 2021-06-14 | End: 2022-04-29

## 2021-06-14 SDOH — ECONOMIC STABILITY: FOOD INSECURITY: WITHIN THE PAST 12 MONTHS, THE FOOD YOU BOUGHT JUST DIDN'T LAST AND YOU DIDN'T HAVE MONEY TO GET MORE.: NEVER TRUE

## 2021-06-14 SDOH — ECONOMIC STABILITY: FOOD INSECURITY: WITHIN THE PAST 12 MONTHS, YOU WORRIED THAT YOUR FOOD WOULD RUN OUT BEFORE YOU GOT MONEY TO BUY MORE.: NEVER TRUE

## 2021-06-14 ASSESSMENT — SOCIAL DETERMINANTS OF HEALTH (SDOH): HOW HARD IS IT FOR YOU TO PAY FOR THE VERY BASICS LIKE FOOD, HOUSING, MEDICAL CARE, AND HEATING?: NOT HARD AT ALL

## 2021-06-14 NOTE — PROGRESS NOTES
Chief Complaint:   Braden Barrett is a 62 y.o. female who presents for complete physical examination. History of Present Illness:    Urinary incontinence: stopped Myrbetriq. Read online it is linked to   Alzheimer's disease. Urge incontinence worsening. Leaking daily. Need pad daily. Also with concern for bladder prolapse- can feel something coming out of vagina. Denies pain. States issues with bladder prolapse after 2nd pregnancy approx 30 yrs ago. GERD: trouble clearing throat d/t sensation of \"cement in throat\". At times voice sounds hoarse. Associated with wheezing cough, dry cough. Noticing mild burning in throat. Denies wet burps or acid in throat. She is taking Nexium 40 mg daily with breakfast     Cardiomyopathy & Hyperlipidemia: managed by Dr. Michelle Benitez, [facility] cardiology. Last eval 4/28/21. Lisinopril stopped d/t cough and started on Diovan 80 mg. Tolerating well, no side effects.      Past Medical History:   Diagnosis Date    Alcoholism [facility])     Recovering    Chronic left-sided low back pain without sciatica 5/26/2020    Degenerative joint disease     Depression     Fibrocystic disease of breast 6/15/2010    GERD (gastroesophageal reflux disease)     Headache(784.0)     Hemorrhoid 6/15/2010    Hyperlipidemia 6/25/2019    Mitral valve regurgitation     Osteoporosis     Screening mammogram, encounter for 02/04/2018    Dr Nino Odom MD    Urinary incontinence     Vitamin D deficiency 3/16/2012       Past Surgical History:   Procedure Laterality Date    APPENDECTOMY  01/13/2021    BLADDER SURGERY      with hysterectomy-vaginal approach    BREAST SURGERY  2006    benign cyst on left    CARDIAC CATHETERIZATION  01/29/2021    COLONOSCOPY  11/30/2012    Dr Galindo Citizen      facial 12.11    FACIAL COSMETIC SURGERY      HYSTERECTOMY  1996    SALPINGO-OOPHORECTOMY      still with left ovary       Outpatient Medications Marked as Taking for the 6/14/21 encounter (Office Visit) with LANCE Donaldson CNP   Medication Sig Dispense Refill    buPROPion (WELLBUTRIN XL) 300 MG extended release tablet Take 1 tablet by mouth every morning 90 tablet 1    valsartan (DIOVAN) 80 MG tablet Take 1 tablet by mouth daily 30 tablet 3    buPROPion (WELLBUTRIN XL) 150 MG extended release tablet TAKE 1 TABLET BY MOUTH EVERY DAY IN THE MORNING 90 tablet 1    pregabalin (LYRICA) 100 MG capsule Take 1 capsule by mouth daily for 180 days. 90 capsule 1    traZODone (DESYREL) 100 MG tablet TAKE 2 TABLETS BY MOUTH EVERY NIGHT (Patient taking differently: Only taking 1 tablet day) 180 tablet 2    Estradiol (YUVAFEM) 10 MCG TABS vaginal tablet Place 1 tablet vaginally Twice a Week 36 tablet 3    esomeprazole (NEXIUM) 40 MG delayed release capsule Take 1 capsule by mouth every morning (before breakfast) 90 capsule 1    Omega-3 Fatty Acids (FISH OIL PO) Take by mouth      Cholecalciferol (VITAMIN D PO) Take by mouth       Allergies   Allergen Reactions    Lisinopril      Cough       Social History     Socioeconomic History    Marital status:      Spouse name: None    Number of children: None    Years of education: None    Highest education level: None   Occupational History    None   Tobacco Use    Smoking status: Never Smoker    Smokeless tobacco: Never Used   Vaping Use    Vaping Use: Never used   Substance and Sexual Activity    Alcohol use: No     Alcohol/week: 0.0 standard drinks    Drug use: No    Sexual activity: Yes     Partners: Male   Other Topics Concern    None   Social History Narrative    None     Social Determinants of Health     Financial Resource Strain: Low Risk     Difficulty of Paying Living Expenses: Not hard at all   Food Insecurity: No Food Insecurity    Worried About Running Out of Food in the Last Year: Never true    Frankie of Food in the Last Year: Never true   Transportation Needs:     Lack of Transportation (Medical):      Lack of Transportation (Non-Medical):    Physical Activity:     Days of Exercise per Week:     Minutes of Exercise per Session:    Stress:     Feeling of Stress :    Social Connections:     Frequency of Communication with Friends and Family:     Frequency of Social Gatherings with Friends and Family:     Attends Orthodox Services:     Active Member of Clubs or Organizations:     Attends Club or Organization Meetings:     Marital Status:    Intimate Partner Violence:     Fear of Current or Ex-Partner:     Emotionally Abused:     Physically Abused:     Sexually Abused:        Family history was updated. Health Maintenance   Topic Date Due    Pneumococcal 0-64 years Vaccine (1 of 2 - PPSV23) Never done    Potassium monitoring  04/18/2020    Creatinine monitoring  04/18/2020    Shingles Vaccine (2 of 2) 08/09/2021    Breast cancer screen  03/15/2023    DTaP/Tdap/Td vaccine (2 - Td or Tdap) 04/03/2023    Lipid screen  04/18/2024    Colon cancer screen colonoscopy  12/23/2024    Flu vaccine  Completed    COVID-19 Vaccine  Completed    Hepatitis C screen  Completed    HIV screen  Completed    Hepatitis A vaccine  Aged Out    Hepatitis B vaccine  Aged Out    Hib vaccine  Aged Out    Meningococcal (ACWY) vaccine  Aged Out     Last PAP: < 1 yr  Last mammogram: < 1 yr  Last colonoscopy: 2020  Last eye exam: 2021  Last dental exam: < 12 months  Regular exercise: none  Balanced diet: mostly      Review Of Systems:  A comprehensive review of systems was negative except for what was noted in the HPI. PHYSICAL EXAMINATION:  /62   Pulse 78   Ht 5' 10\" (1.778 m)   Wt 153 lb 9.6 oz (69.7 kg)   LMP  (Exact Date)   SpO2 97%   BMI 22.04 kg/m²   Constitutional: Oriented to person, place, and time. Appears well-developed and well-nourished. No distress. HENT:   Head: Normocephalic and atraumatic.    Ears: Hearing, tympanic membrane, external ear and ear canal normal.   Nose: Nose normal. Mouth/Throat: Uvula is midline, oropharynx is clear and moist and mucous membranes are normal.   Eyes: Conjunctivae and EOM are normal. Pupils are equal, round, and reactive to light. Neck: Normal range of motion. Neck supple. Normal carotid pulses and no JVD present. Carotid bruit is not present. No tracheal deviation present. No mass and no thyromegaly present. Cardiovascular: Normal rate, regular rhythm, S1 normal, S2 normal, normal heart sounds and intact distal pulses. No murmur heard. Pulmonary/Chest: Effort normal and breath sounds normal. No stridor. No respiratory distress. No decreased breath sounds. No wheezes. No rhonchi. No rales. Abdominal: Soft. Normal appearance and bowel sounds are normal. No distension, no abdominal bruit and no mass. There is no hepatomegaly. No tenderness. Musculoskeletal: Normal range of motion. No edema. Lymphadenopathy:     No cervical adenopathy. No supraclavicular adenopathy. Neurological: Alert and oriented to person, place, and time. No tremor. She exhibits normal muscle tone. Coordination and gait normal.   Skin: Skin is warm and dry. No rash noted. Not diaphoretic. No cyanosis. No pallor. Nails show no clubbing. Psychiatric:  Normal mood and affect. Speech is normal and behavior is normal. Cognition and memory are normal.   Pelvic: Exam deferred to OB/GYN    UA:   Results for POC orders placed in visit on 06/14/21   POCT Urinalysis no Micro   Result Value Ref Range    Color, UA yellow     Clarity, UA cloudy     Glucose, UA POC neg     Bilirubin, UA neg     Ketones, UA neg     Spec Grav, UA 1.010     Blood, UA POC trace-intact     pH, UA 5.0     Protein, UA POC neg     Urobilinogen, UA 0.2     Leukocytes, UA neg     Nitrite, UA neg        ASSESSMENT:   Complete physical without pap. 1. Well adult exam    2. Urge incontinence of urine    3. Gastroesophageal reflux disease, unspecified whether esophagitis present    4.  Secondary cardiomyopathy ([facility]    5. Hyperlipidemia, unspecified hyperlipidemia type    6. Current mild episode of major depressive disorder without prior episode ([facility]    7. Need for shingles vaccine          PLAN:   1. Well adult exam  All health maintenance issues were updated. Recommend begin progressive daily aerobic exercise program, follow a low fat, low cholesterol diet, continue current medications and return for routine annual checkups  -     POCT Urinalysis no Micro  -     CBC Auto Differential; Future  -     TSH WITH REFLEX TO FT4; Future  -     Basic Metabolic Panel; Future    2. Urge incontinence of urine  Worsening   -     External Referral To Urology    3. Gastroesophageal reflux disease, unspecified whether esophagitis present  Worsening  Take Nexium on empty stomach daily  May add OTC Prevacid or Pepcid for 1- 2 weeks  Tift diet for 3-5 days  -     External Referral To Gastroenterology    4. Secondary cardiomyopathy ([facility]  Stable  Progress note from 4/28/21 with Dr Blair Chi reviewed today through [address] updated in med rec    5. Hyperlipidemia, unspecified hyperlipidemia type  FLP from 4/6/21 reviewed today through [address] with diet  Strongly recommend eliminating concentrated sweets, reducing simple carbs. Avoid corn syrup and hydrogenated oils. Focus on fruits, vegs, whole grains, lean meats and push water. Fish oil, high fiber foods recommended. 6. Current mild episode of major depressive disorder without prior episode (HCC)  Stable   -     buPROPion (WELLBUTRIN XL) 300 MG extended release tablet; Take 1 tablet by mouth every morning    7. Need for shingles vaccine  -     Zoster recombinant [facility])  See pt instructions  F/u 6 months, sooner for SHingrix x 2  Discussed use, benefit, and side effects of prescribed medications. All patient questions answered. Pt voiced understanding.

## 2021-06-14 NOTE — PATIENT INSTRUCTIONS
May take over the counter Pepcid and Prevacid twice a day as needed for 10-14 fays. Patient Education        Well Visit, Women 48 to 72: Care Instructions  Overview     Well visits can help you stay healthy. Your doctor has checked your overall health and may have suggested ways to take good care of yourself. Your doctor also may have recommended tests. At home, you can help prevent illness with healthy eating, regular exercise, and other steps. Follow-up care is a key part of your treatment and safety. Be sure to make and go to all appointments, and call your doctor if you are having problems. It's also a good idea to know your test results and keep a list of the medicines you take. How can you care for yourself at home? · Get screening tests that you and your doctor decide on. Screening helps find diseases before any symptoms appear. · Eat healthy foods. Choose fruits, vegetables, whole grains, protein, and low-fat dairy foods. Limit fat, especially saturated fat. Reduce salt in your diet. · Limit alcohol. Have no more than 1 drink a day or 7 drinks a week. · Get at least 30 minutes of exercise on most days of the week. Walking is a good choice. You also may want to do other activities, such as running, swimming, cycling, or playing tennis or team sports. · Reach and stay at a healthy weight. This will lower your risk for many problems, such as obesity, diabetes, heart disease, and high blood pressure. · Do not smoke. Smoking can make health problems worse. If you need help quitting, talk to your doctor about stop-smoking programs and medicines. These can increase your chances of quitting for good. · Care for your mental health. It is easy to get weighed down by worry and stress. Learn strategies to manage stress, like deep breathing and mindfulness, and stay connected with your family and community. If you find you often feel sad or hopeless, talk with your doctor. Treatment can help.   · Talk to your doctor about whether you have any risk factors for sexually transmitted infections (STIs). You can help prevent STIs if you wait to have sex with a new partner (or partners) until you've each been tested for STIs. It also helps if you use condoms (male or female condoms) and if you limit your sex partners to one person who only has sex with you. Vaccines are available for some STIs. · If you think you may have a problem with alcohol or drug use, talk to your doctor. This includes prescription medicines (such as amphetamines and opioids) and illegal drugs (such as cocaine and methamphetamine). Your doctor can help you figure out what type of treatment is best for you. · Protect your skin from too much sun. When you're outdoors from 10 a.m. to 4 p.m., stay in the shade or cover up with clothing and a hat with a wide brim. Wear sunglasses that block UV rays. Even when it's cloudy, put broad-spectrum sunscreen (SPF 30 or higher) on any exposed skin. · See a dentist one or two times a year for checkups and to have your teeth cleaned. · Wear a seat belt in the car. When should you call for help? Watch closely for changes in your health, and be sure to contact your doctor if you have any problems or symptoms that concern you. Where can you learn more? Go to https://[employer] Solutionsandressaeb.[employer]. org and sign in to your [employer] account. Enter G262 in the [employer] box to learn more about \"Well Visit, Women 50 to 72: Care Instructions. \"     If you do not have an account, please click on the \"Sign Up Now\" link. Current as of: May 27, 2020               Content Version: 12.8  © 3003-8711 Healthwise, Incorporated. Care instructions adapted under license by [facility] ([facility]). If you have questions about a medical condition or this instruction, always ask your healthcare professional. [address] any warranty or liability for your use of this information.

## 2021-09-01 ENCOUNTER — OFFICE VISIT (OUTPATIENT)
Dept: FAMILY MEDICINE CLINIC | Age: 61
End: 2021-09-01
Payer: COMMERCIAL

## 2021-09-01 VITALS
TEMPERATURE: 97.8 F | OXYGEN SATURATION: 99 % | DIASTOLIC BLOOD PRESSURE: 68 MMHG | HEIGHT: 69 IN | WEIGHT: 156.4 LBS | BODY MASS INDEX: 23.16 KG/M2 | RESPIRATION RATE: 14 BRPM | SYSTOLIC BLOOD PRESSURE: 104 MMHG | HEART RATE: 67 BPM

## 2021-09-01 DIAGNOSIS — M25.562 CHRONIC PAIN OF BOTH KNEES: Primary | ICD-10-CM

## 2021-09-01 DIAGNOSIS — M81.0 POSTMENOPAUSAL OSTEOPOROSIS: ICD-10-CM

## 2021-09-01 DIAGNOSIS — M25.561 CHRONIC PAIN OF BOTH KNEES: Primary | ICD-10-CM

## 2021-09-01 DIAGNOSIS — E55.9 VITAMIN D DEFICIENCY: ICD-10-CM

## 2021-09-01 DIAGNOSIS — K21.9 GASTROESOPHAGEAL REFLUX DISEASE, UNSPECIFIED WHETHER ESOPHAGITIS PRESENT: ICD-10-CM

## 2021-09-01 DIAGNOSIS — I25.2 HISTORY OF NON-ST ELEVATION MYOCARDIAL INFARCTION (NSTEMI): ICD-10-CM

## 2021-09-01 DIAGNOSIS — G89.29 CHRONIC PAIN OF BOTH KNEES: Primary | ICD-10-CM

## 2021-09-01 DIAGNOSIS — Z01.818 PREOP EXAMINATION: ICD-10-CM

## 2021-09-01 DIAGNOSIS — I42.9 SECONDARY CARDIOMYOPATHY (HCC): ICD-10-CM

## 2021-09-01 PROCEDURE — 99214 OFFICE O/P EST MOD 30 MIN: CPT | Performed by: NURSE PRACTITIONER

## 2021-09-01 RX ORDER — MIRABEGRON 50 MG/1
TABLET, FILM COATED, EXTENDED RELEASE ORAL
COMMUNITY
Start: 2021-08-09 | End: 2022-08-11

## 2021-09-01 NOTE — PATIENT INSTRUCTIONS
Patient Education        Partial Knee Replacement: Before Your Surgery  What is partial knee replacement? Partial knee replacement is used when one side of the knee is damaged. It replaces only the damaged part of the knee. Your doctor will make a cut in your knee. This cut is called an incision. It will leave a scar that usually fades with time. You may be able to go home the same day. If you have partials on both knees at once, you may need to stay in the hospital for a day or more. Most people go back to normal activities or work in 6 to 12 weeks. This depends on your health. It also depends on how well your knee does in your rehab program. This may take longer if you have both knees done at the same time. Follow-up care is a key part of your treatment and safety. Be sure to make and go to all appointments, and call your doctor if you are having problems. It's also a good idea to know your test results and keep a list of the medicines you take. How do you prepare for surgery? Surgery can be stressful. This information will help you understand what you can expect. And it will help you safely prepare for surgery. Preparing for surgery    · You may need to shower or bathe with a special soap the night before and the morning of your surgery. The soap contains chlorhexidine. It reduces the amount of bacteria on your skin that could cause an infection after surgery.     · Be sure you have someone to take you home. Anesthesia and pain medicine will make it unsafe for you to drive or get home on your own.     · Understand exactly what surgery is planned, along with the risks, benefits, and other options.     · Tell your doctor ALL the medicines, vitamins, supplements, and herbal remedies you take. Some may increase the risk of problems during your surgery.  Your doctor will tell you if you should stop taking any of them before the surgery and how soon to do it.     · If you take aspirin or some other blood thinner, ask your doctor if you should stop taking it before your surgery. Make sure that you understand exactly what your doctor wants you to do. These medicines increase the risk of bleeding.     · Make sure your doctor and the hospital have a copy of your advance directive. If you don't have one, you may want to prepare one. It lets others know your health care wishes. It's a good thing to have before any type of surgery or procedure. What happens on the day of surgery? · Follow the instructions exactly about when to stop eating and drinking. If you don't, your surgery may be canceled. If your doctor told you to take your medicines on the day of surgery, take them with only a sip of water.     · Take a bath or shower before you come in for your surgery. Do not apply lotions, perfumes, deodorants, or nail polish.     · Do not shave the surgical site yourself.     · Take off all jewelry and piercings. And take out contact lenses, if you wear them. At the hospital or surgery center   · Bring a picture ID.     · The area for surgery is often marked to make sure there are no errors.     · You will be kept comfortable and safe by your anesthesia provider. The anesthesia may make you sleep. Or it may just numb the area being worked on.     · You may also get a shot of medicine into your spine. This will make your legs numb. You will not feel pain during the surgery.     · You also will get antibiotics through an IV tube before surgery. This lowers the risk of an infection of the incision.     · The surgery will take about 2 to 3 hours. When should you call your doctor? · You have questions or concerns.     · You don't understand how to prepare for your surgery.     · You become ill before the surgery (such as fever, flu, or a cold).     · You need to reschedule or have changed your mind about having the surgery. Where can you learn more? Go to https://chandressaeb.[employer]. org and sign in to your [employer] account. Enter [phone] in the [facility] box to learn more about \"Partial Knee Replacement: Before Your Surgery. \"     If you do not have an account, please click on the \"Sign Up Now\" link. Current as of: November 16, 2020               Content Version: 12.9  © 9056-0489 [employer]. Care instructions adapted under license by [facility] ([facility]). If you have questions about a medical condition or this instruction, always ask your healthcare professional. [address] any warranty or liability for your use of this information.

## 2021-09-01 NOTE — PROGRESS NOTES
[address] Preoperative Evaluation       Salvador Bernal CNP     [address], [address] [facility]     (phone) [phone]       (fax) [phone]    Dear Dr. Gina Coe,     Thank you for referring Wesley Urias to me for Preoperative Evaluation. Below are the relevant portions of my assessment and plan of care. Wesley Urias    62 y.o.   1960    [address] [place] [address]    Vitals:    09/01/21 1011   BP: 104/68   Pulse: 67   Resp: 14   Temp: 97.8 °F (36.6 °C)   SpO2: 99%   Weight: 156 lb 6.4 oz (70.9 kg)   Height: 5' 8.5\" (1.74 m)      Wt Readings from Last 2 Encounters:   09/01/21 156 lb 6.4 oz (70.9 kg)   06/14/21 153 lb 9.6 oz (69.7 kg)     BP Readings from Last 3 Encounters:   09/01/21 104/68   06/14/21 104/62   02/02/21 102/64        Allergies   Allergen Reactions    Lisinopril      Cough     Current Outpatient Medications   Medication Sig Dispense Refill    MYRBETRIQ 50 MG TB24 TAKE 1 TABLET BY MOUTH EVERY DAY      buPROPion (WELLBUTRIN XL) 300 MG extended release tablet Take 1 tablet by mouth every morning 90 tablet 1    valsartan (DIOVAN) 80 MG tablet Take 1 tablet by mouth daily 30 tablet 3    buPROPion (WELLBUTRIN XL) 150 MG extended release tablet TAKE 1 TABLET BY MOUTH EVERY DAY IN THE MORNING 90 tablet 1    aspirin 81 MG chewable tablet Take 81 mg by mouth daily      traZODone (DESYREL) 100 MG tablet TAKE 2 TABLETS BY MOUTH EVERY NIGHT (Patient taking differently: Only taking 1 tablet day) 180 tablet 2    Estradiol (YUVAFEM) 10 MCG TABS vaginal tablet Place 1 tablet vaginally Twice a Week 36 tablet 3    esomeprazole (NEXIUM) 40 MG delayed release capsule Take 1 capsule by mouth every morning (before breakfast) 90 capsule 1    Omega-3 Fatty Acids (FISH OIL PO) Take by mouth      Cholecalciferol (VITAMIN D PO) Take by mouth      pregabalin (LYRICA) 100 MG capsule Take 1 capsule by mouth daily for 180 days.  90 capsule 1     No current facility-administered medications for this visit. She presents to the office today for a preoperative consultation at the request of surgeon, Katherine Chavarria who plans on performing left partial knee replacement on September 20 at [facility]. The current problem began several years ago and has worsened. Conservative therapy, including PT, cortisone injection, gel injections, has failed. Planned anesthesia is General.  The patient has no known anesthesia issues. Patient has no bleeding risk. Patient does not have objection to receiving blood products if needed.     Past Medical History:   Diagnosis Date    Alcoholism [facility])     Recovering    Chronic left-sided low back pain without sciatica 5/26/2020    Degenerative joint disease     Depression     Fibrocystic disease of breast 6/15/2010    GERD (gastroesophageal reflux disease)     Headache(784.0)     Hemorrhoid 6/15/2010    Hyperlipidemia 6/25/2019    Mitral valve regurgitation     Osteoporosis     Screening mammogram, encounter for 02/04/2018    Dr Gibran Hernández MD    Urinary incontinence     Vitamin D deficiency 3/16/2012     Past Surgical History:   Procedure Laterality Date    APPENDECTOMY  01/13/2021    BLADDER SURGERY      with hysterectomy-vaginal approach    BREAST SURGERY  2006    benign cyst on left    CARDIAC CATHETERIZATION  01/29/2021    COLONOSCOPY  11/30/2012    Dr Rohit Baxter      facial 12.11    FACIAL COSMETIC SURGERY      HYSTERECTOMY  1996    SALPINGO-OOPHORECTOMY      still with left ovary     Family History is not significant for reactions to anesthesia    Family History   Problem Relation Age of Onset    High Blood Pressure Mother     High Cholesterol Mother     High Blood Pressure Father     High Cholesterol Father     Arthritis Father     Arthritis Paternal Grandmother     Rheum Arthritis Neg Hx     Osteoarthritis Neg Hx     Asthma Neg Hx     Breast Cancer Neg Hx     Cancer Neg Hx     Diabetes Neg Hx     Heart Failure Neg Hx     Hypertension Neg Hx     Migraines Neg Hx     Ovarian Cancer Neg Hx     Rashes/Skin Problems Neg Hx     Seizures Neg Hx     Stroke Neg Hx     Thyroid Disease Neg Hx      Social History     Socioeconomic History    Marital status:      Spouse name: Not on file    Number of children: Not on file    Years of education: Not on file    Highest education level: Not on file   Occupational History    Not on file   Tobacco Use    Smoking status: Never Smoker    Smokeless tobacco: Never Used   Vaping Use    Vaping Use: Never used   Substance and Sexual Activity    Alcohol use: No     Alcohol/week: 0.0 standard drinks    Drug use: No    Sexual activity: Yes     Partners: Male   Other Topics Concern    Not on file   Social History Narrative    Not on file     Social Determinants of Health     Financial Resource Strain: Low Risk     Difficulty of Paying Living Expenses: Not hard at all   Food Insecurity: No Food Insecurity    Worried About Running Out of Food in the Last Year: Never true    Frankie of Food in the Last Year: Never true   Transportation Needs:     Lack of Transportation (Medical):      Lack of Transportation (Non-Medical):    Physical Activity:     Days of Exercise per Week:     Minutes of Exercise per Session:    Stress:     Feeling of Stress :    Social Connections:     Frequency of Communication with Friends and Family:     Frequency of Social Gatherings with Friends and Family:     Attends Oriental orthodox Services:     Active Member of Clubs or Organizations:     Attends Club or Organization Meetings:     Marital Status:    Intimate Partner Violence:     Fear of Current or Ex-Partner:     Emotionally Abused:     Physically Abused:     Sexually Abused:        Review of Systems  Constitutional: negative  Eyes: negative  Ears, nose, mouth, throat, and face: negative  Respiratory: negative  Cardiovascular: negative  Gastrointestinal: negative  Genitourinary:negative  Integument/breast: negative  Hematologic/lymphatic: negative  Musculoskeletal:negative except for chronic knee, shoulder and low back pain  Neurological: negative  Behavioral/Psych: negative  Endocrine: negative     Physical Exam   /68   Pulse 67   Temp 97.8 °F (36.6 °C)   Resp 14   Ht 5' 8.5\" (1.74 m)   Wt 156 lb 6.4 oz (70.9 kg)   LMP  (Exact Date)   SpO2 99%   Breastfeeding No   BMI 23.43 kg/m²   Constitutional: Patient is oriented to person, place, and time. She appears well-developed and well-nourished. No distress. Head: Normocephalic and atraumatic. Right Ear: Tympanic membrane, external ear and ear canal normal.   Left Ear: Tympanic membrane, external ear and ear canal normal.   Nose: Nose normal.   Mouth/Throat: Oropharynx is clear and moist, and mucous membranes are normal.  There is no cervical adenopathy. There are no loose teeth. Eyes: Conjunctivae and extraocular motions are normal. Pupils are equal, round, and reactive to light bilaterally. Neck: Neck supple. No JVD present. No carotid bruit present. No mass and no thyromegaly present. Cardiovascular: Normal rate, regular rhythm, normal heart sounds and intact distal pulses. Exam reveals no gallop and no friction rub. No murmur heard. Pulmonary/Chest: Effort normal and breath sounds normal. No respiratory distress. There are no wheezes, rhonchi or rales. Abdominal: Soft, non-tender. Normal bowel sounds and aorta. There is no organomegaly, bruit or palpable mass. Neurological: She is alert and oriented to person, place, and time. No cranial nerve deficit. Coordination normal.   Skin: Skin is warm and dry. There is no rash or erythema. No suspicious lesions noted. Psychiatric: She has a normal mood and affect.  Speech and behavior are normal. Judgment, cognition and memory are normal.        Lab Review   Lab Results   Component Value Date     06/14/2021

## 2021-09-09 RX ORDER — M-VIT,TX,IRON,MINS/CALC/FOLIC 27MG-0.4MG
1 TABLET ORAL DAILY
COMMUNITY

## 2021-09-09 NOTE — PROGRESS NOTES
Name_______________________________________Printed:____________________  Date and time of surgery___9/20/21 @ 1215_____________________Arrival Time:__[facility] [place]__   1. The instructions given regarding when and if a patient needs to stop oral intake prior to surgery varies. Follow the specific instructions you were given                  _x__Nothing to eat or to drink after Midnight the night before.                   ____Carbo loading or ERAS instructions will be given to select patients-if you have been given those instructions -please do the following                           The evening before your surgery after dinner before midnight drink 40 ounces of gatorade. If you are diabetic use sugar free. The morning of surgery drink 40 ounces of water. This needs to be finished 3 hours prior to your surgery start time. 2. Take the following pills with a small sip of water on the morning of surgery_____nexium______________________________________________                  Do not take blood pressure medications ending in pril or sartan the clark prior to surgery or the morning of surgery_   3. Aspirin, Ibuprofen, Advil, Naproxen, Vitamin E and other Anti-inflammatory products and supplements should be stopped for 5 -7days before surgery or as directed by your physician. 4. Check with your Doctor regarding stopping Plavix, Coumadin,Eliquis, Lovenox,Effient,Pradaxa,Xarelto, Fragmin or other blood thinners and follow their instructions. 5. Do not smoke, and do not drink any alcoholic beverages 24 hours prior to surgery. This includes NA Beer. Refrain from the usage of any recreational drugs. 6. You may brush your teeth and gargle the morning of surgery. DO NOT SWALLOW WATER   7. You MUST make arrangements for a responsible adult to stay on site while you are here and take you home after your surgery. You will not be allowed to leave alone or drive yourself home.   It is strongly suggested someone stay with you the first 24 hrs. Your surgery will be cancelled if you do not have a ride home. 8. A parent/legal guardian must accompany a child scheduled for surgery and plan to stay at the hospital until the child is discharged. Please do not bring other children with you. 9. Please wear simple, loose fitting clothing to the hospital.  Heike Perez not bring valuables (money, credit cards, checkbooks, etc.) Do not wear any makeup (including no eye makeup) or nail polish on your fingers or toes. 10. DO NOT wear any jewelry or piercings on day of surgery. All body piercing jewelry must be removed. 11. If you have ___dentures, they will be removed before going to the OR; we will provide you a container. If you wear ___contact lenses or _x__glasses, they will be removed; please bring a case for them. 12. Please see your family doctor/pediatrician for a history & physical and/or concerning medications. Bring any test results/reports from your physician's office. PCP___on chart_______________Phone___________H&P Appt. Date________             13 If you  have a Living Will and Durable Power of  for Healthcare, please bring in a copy. 15. Notify your Surgeon if you develop any illness between now and surgery  time, cough, cold, fever, sore throat, nausea, vomiting, etc.  Please notify your surgeon if you experience dizziness, shortness of breath or blurred vision between now & the time of your surgery             15. DO NOT shave your operative site 96 hours prior to surgery. For face & neck surgery, men may use an electric razor 48 hours prior to surgery. 16. Shower the night before or morning of surgery using an antibacterial soap or as you have been instructed. 17. To provide excellent care visitors will be limited to one in the room at any given time. 18.  Please bring picture ID and insurance card.              19.  Visit our web site for additional information:  [email or web address]              20.During flu season no children under the age of 15 are permitted in the hospital for the safety of all patients. 21. If you take a long acting insulin in the evening only  take half of your usual  dose the night  before your procedure              22. If you use a c-pap please bring DOS if staying overnight,             23.For your convenience [facility] has a pharmacy on site to fill your prescriptions. 24. If you use oxygen and have a portable tank please bring it  with you the DOS             25. Bring a complete list of all your medications with name and dose include any supplements. 26. Other__________________________________________   *Please call pre admission testing if you any further questions   Lisa Obando   [address] [place] [address]. [place]  [phone]   [facility]    ___ Vaccinated-patient instructed to bring card    __x_ Covid test to be done 3-5 days prior to scheduled surgery if not vaccinated-patient aware they are REQUIRED to bring a copy of the negative result DOS-if they receive a positive result to notify their surgeon         If known - indicate where patient is getting covid test done ______[facility]  _____________________________________________________    ___ [facility] - DOS    ___ Other___________________________________      Milagro Watters POLICY(subject to change)    There is a one visitor policy at [facility] for all surgeries and endoscopies. Whether the visitor can stay or will be asked to wait in the car will depend on the current policy and if social distancing can be maintained. The policy is subject to change at any time. Please make sure the visitor has a cell phone that is on,charged and able to accept calls, as this may be the way that the staff communicates with them. Pain management is NO VISITOR policyThe patients ride is expected to remain in the car with a cell phone for communication. If the ride is leaving the hospital grounds please make sure they are back in time for pickup. Have the patient inform the staff on arrival what their rides plans are while the patient is in the facility. At the MAIN there is one visitor allowed. Please note that the visitor policy is subject to change. All above information reviewed with patient in person or by phone. Patient verbalizes understanding. All questions and concerns addressed.                                                                                                  Patient/Rep____pt_______________                                                                                                                                    PRE OP INSTRUCTIONS

## 2021-09-11 ENCOUNTER — HOSPITAL ENCOUNTER (OUTPATIENT)
Age: 61
Discharge: HOME | End: 2021-09-11
Payer: COMMERCIAL

## 2021-09-11 DIAGNOSIS — Z01.818 PREOP TESTING: ICD-10-CM

## 2021-09-11 LAB
ABO/RH: NORMAL
ANION GAP SERPL CALCULATED.3IONS-SCNC: 12 MMOL/L (ref 3–16)
ANTIBODY SCREEN: NORMAL
APTT: 38 SEC (ref 26.2–38.6)
BACTERIA: ABNORMAL /HPF
BASOPHILS ABSOLUTE: 0 K/UL (ref 0–0.2)
BASOPHILS RELATIVE PERCENT: 0.7 %
BILIRUBIN URINE: NEGATIVE
BLOOD, URINE: NEGATIVE
BUN BLDV-MCNC: 11 MG/DL (ref 7–20)
CALCIUM SERPL-MCNC: 9.8 MG/DL (ref 8.3–10.6)
CHLORIDE BLD-SCNC: 98 MMOL/L (ref 99–110)
CLARITY: ABNORMAL
CO2: 23 MMOL/L (ref 21–32)
COLOR: YELLOW
CREAT SERPL-MCNC: 0.9 MG/DL (ref 0.6–1.2)
EOSINOPHILS ABSOLUTE: 0.2 K/UL (ref 0–0.6)
EOSINOPHILS RELATIVE PERCENT: 3.6 %
EPITHELIAL CELLS, UA: 0 /HPF (ref 0–5)
GFR AFRICAN AMERICAN: >60
GFR NON-AFRICAN AMERICAN: >60
GLUCOSE BLD-MCNC: 88 MG/DL (ref 70–99)
GLUCOSE URINE: NEGATIVE MG/DL
HCT VFR BLD CALC: 36.9 % (ref 36–48)
HEMOGLOBIN: 12.6 G/DL (ref 12–16)
HYALINE CASTS: 2 /LPF (ref 0–8)
INR BLD: 1.01 (ref 0.88–1.12)
KETONES, URINE: NEGATIVE MG/DL
LEUKOCYTE ESTERASE, URINE: ABNORMAL
LYMPHOCYTES ABSOLUTE: 1.5 K/UL (ref 1–5.1)
LYMPHOCYTES RELATIVE PERCENT: 28.3 %
MCH RBC QN AUTO: 30.7 PG (ref 26–34)
MCHC RBC AUTO-ENTMCNC: 34.1 G/DL (ref 31–36)
MCV RBC AUTO: 89.9 FL (ref 80–100)
MICROSCOPIC EXAMINATION: YES
MONOCYTES ABSOLUTE: 0.4 K/UL (ref 0–1.3)
MONOCYTES RELATIVE PERCENT: 8.2 %
NEUTROPHILS ABSOLUTE: 3.2 K/UL (ref 1.7–7.7)
NEUTROPHILS RELATIVE PERCENT: 59.2 %
NITRITE, URINE: NEGATIVE
PDW BLD-RTO: 12.6 % (ref 12.4–15.4)
PH UA: 6.5 (ref 5–8)
PLATELET # BLD: 284 K/UL (ref 135–450)
PMV BLD AUTO: 8.7 FL (ref 5–10.5)
POTASSIUM SERPL-SCNC: 4.5 MMOL/L (ref 3.5–5.1)
PROTEIN UA: NEGATIVE MG/DL
PROTHROMBIN TIME: 11.4 SEC (ref 9.9–12.7)
RBC # BLD: 4.1 M/UL (ref 4–5.2)
RBC UA: 3 /HPF (ref 0–4)
SODIUM BLD-SCNC: 133 MMOL/L (ref 136–145)
SPECIFIC GRAVITY UA: 1.02 (ref 1–1.03)
URINE TYPE: ABNORMAL
UROBILINOGEN, URINE: 0.2 E.U./DL
WBC # BLD: 5.4 K/UL (ref 4–11)
WBC UA: 126 /HPF (ref 0–5)

## 2021-09-11 PROCEDURE — 87186 SC STD MICRODIL/AGAR DIL: CPT

## 2021-09-11 PROCEDURE — 86900 BLOOD TYPING SEROLOGIC ABO: CPT

## 2021-09-11 PROCEDURE — 86850 RBC ANTIBODY SCREEN: CPT

## 2021-09-11 PROCEDURE — 85025 COMPLETE CBC W/AUTO DIFF WBC: CPT

## 2021-09-11 PROCEDURE — 87077 CULTURE AEROBIC IDENTIFY: CPT

## 2021-09-11 PROCEDURE — 85730 THROMBOPLASTIN TIME PARTIAL: CPT

## 2021-09-11 PROCEDURE — 80048 BASIC METABOLIC PNL TOTAL CA: CPT

## 2021-09-11 PROCEDURE — 81001 URINALYSIS AUTO W/SCOPE: CPT

## 2021-09-11 PROCEDURE — 36415 COLL VENOUS BLD VENIPUNCTURE: CPT

## 2021-09-11 PROCEDURE — 86901 BLOOD TYPING SEROLOGIC RH(D): CPT

## 2021-09-11 PROCEDURE — 87081 CULTURE SCREEN ONLY: CPT

## 2021-09-11 PROCEDURE — 85610 PROTHROMBIN TIME: CPT

## 2021-09-11 PROCEDURE — 87086 URINE CULTURE/COLONY COUNT: CPT

## 2021-09-13 ENCOUNTER — TELEPHONE (OUTPATIENT)
Dept: FAMILY MEDICINE CLINIC | Age: 61
End: 2021-09-13

## 2021-09-13 LAB
MRSA CULTURE ONLY: NORMAL
ORGANISM: ABNORMAL
URINE CULTURE, ROUTINE: ABNORMAL

## 2021-09-13 RX ORDER — CIPROFLOXACIN 500 MG/1
500 TABLET, FILM COATED ORAL 2 TIMES DAILY
Qty: 10 TABLET | Refills: 0 | Status: SHIPPED | OUTPATIENT
Start: 2021-09-13 | End: 2021-09-18

## 2021-09-13 NOTE — PROGRESS NOTES
Notified Babetta Apgar @ Dr. [facility] office of abnormal urine culture done on 21.

## 2021-09-13 NOTE — TELEPHONE ENCOUNTER
I was able to review urine culture as ordered by Dr Angelo Lacy. I sent over Cipro 500 mg .  To be taken twice a day for 5 days

## 2021-09-13 NOTE — TELEPHONE ENCOUNTER
Pt is scheduled for surgery on 9/20/21, and she has a abnormal urine culture. Hospital would like us to treat the pt before her surgery. Please call and speak with pt.

## 2021-09-17 NOTE — PROGRESS NOTES
Virtual joint class not completed. Called patient and resent. Patient is planning to be outpatient. She will need a rw and is planning on setting up her first outpatient pt appt. Patient lives in a 2 story home with every needed room on the main level. Patient has 1 step into home. Haroldo Dial requires the assistance of a standard walker to successfully complete daily living tasks such as: bathing, toileting, dressing and grooming. A standard walker is necessary due to the patient's impaired ambulation and mobility restrictions, and can ambulate only by using a walker instead of a lesser assistive device, such as a cane or crutch.       Electronically signed by Kassidy Abraham RN on 9/20/2021 at 9:47 AM

## 2021-09-20 ENCOUNTER — ANESTHESIA EVENT (OUTPATIENT)
Dept: OPERATING ROOM | Age: 61
End: 2021-09-20
Payer: COMMERCIAL

## 2021-09-20 ENCOUNTER — ANESTHESIA (OUTPATIENT)
Dept: OPERATING ROOM | Age: 61
End: 2021-09-20
Payer: COMMERCIAL

## 2021-09-20 ENCOUNTER — HOSPITAL ENCOUNTER (OUTPATIENT)
Age: 61
Discharge: HOME | End: 2021-09-20
Attending: ORTHOPAEDIC SURGERY | Admitting: ORTHOPAEDIC SURGERY
Payer: COMMERCIAL

## 2021-09-20 VITALS
BODY MASS INDEX: 23.99 KG/M2 | SYSTOLIC BLOOD PRESSURE: 118 MMHG | TEMPERATURE: 97 F | DIASTOLIC BLOOD PRESSURE: 53 MMHG | RESPIRATION RATE: 16 BRPM | WEIGHT: 162 LBS | HEIGHT: 69 IN | OXYGEN SATURATION: 100 % | HEART RATE: 57 BPM

## 2021-09-20 VITALS
TEMPERATURE: 96.8 F | DIASTOLIC BLOOD PRESSURE: 78 MMHG | SYSTOLIC BLOOD PRESSURE: 134 MMHG | RESPIRATION RATE: 9 BRPM | OXYGEN SATURATION: 99 %

## 2021-09-20 DIAGNOSIS — Z01.818 PREOP TESTING: Primary | ICD-10-CM

## 2021-09-20 DIAGNOSIS — M17.12 PRIMARY OSTEOARTHRITIS OF LEFT KNEE: ICD-10-CM

## 2021-09-20 LAB
ABO/RH: NORMAL
ANTIBODY SCREEN: NORMAL

## 2021-09-20 PROCEDURE — 3600000004 HC SURGERY LEVEL 4 BASE: Performed by: ORTHOPAEDIC SURGERY

## 2021-09-20 PROCEDURE — C1713 ANCHOR/SCREW BN/BN,TIS/BN: HCPCS | Performed by: ORTHOPAEDIC SURGERY

## 2021-09-20 PROCEDURE — 86850 RBC ANTIBODY SCREEN: CPT

## 2021-09-20 PROCEDURE — 3600000014 HC SURGERY LEVEL 4 ADDTL 15MIN: Performed by: ORTHOPAEDIC SURGERY

## 2021-09-20 PROCEDURE — 7100000011 HC PHASE II RECOVERY - ADDTL 15 MIN: Performed by: ORTHOPAEDIC SURGERY

## 2021-09-20 PROCEDURE — 7100000000 HC PACU RECOVERY - FIRST 15 MIN: Performed by: ORTHOPAEDIC SURGERY

## 2021-09-20 PROCEDURE — 86901 BLOOD TYPING SEROLOGIC RH(D): CPT

## 2021-09-20 PROCEDURE — 2500000003 HC RX 250 WO HCPCS: Performed by: NURSE ANESTHETIST, CERTIFIED REGISTERED

## 2021-09-20 PROCEDURE — 2709999900 HC NON-CHARGEABLE SUPPLY: Performed by: ORTHOPAEDIC SURGERY

## 2021-09-20 PROCEDURE — 6360000002 HC RX W HCPCS: Performed by: ANESTHESIOLOGY

## 2021-09-20 PROCEDURE — 3700000000 HC ANESTHESIA ATTENDED CARE: Performed by: ORTHOPAEDIC SURGERY

## 2021-09-20 PROCEDURE — 86900 BLOOD TYPING SEROLOGIC ABO: CPT

## 2021-09-20 PROCEDURE — 64999 UNLISTED PX NERVOUS SYSTEM: CPT | Performed by: ANESTHESIOLOGY

## 2021-09-20 PROCEDURE — 7100000010 HC PHASE II RECOVERY - FIRST 15 MIN: Performed by: ORTHOPAEDIC SURGERY

## 2021-09-20 PROCEDURE — 2580000003 HC RX 258: Performed by: ORTHOPAEDIC SURGERY

## 2021-09-20 PROCEDURE — 6360000002 HC RX W HCPCS: Performed by: NURSE ANESTHETIST, CERTIFIED REGISTERED

## 2021-09-20 PROCEDURE — 64447 NJX AA&/STRD FEMORAL NRV IMG: CPT | Performed by: ANESTHESIOLOGY

## 2021-09-20 PROCEDURE — 6370000000 HC RX 637 (ALT 250 FOR IP): Performed by: ANESTHESIOLOGY

## 2021-09-20 PROCEDURE — 6360000002 HC RX W HCPCS: Performed by: ORTHOPAEDIC SURGERY

## 2021-09-20 PROCEDURE — 7100000001 HC PACU RECOVERY - ADDTL 15 MIN: Performed by: ORTHOPAEDIC SURGERY

## 2021-09-20 PROCEDURE — 3700000001 HC ADD 15 MINUTES (ANESTHESIA): Performed by: ORTHOPAEDIC SURGERY

## 2021-09-20 PROCEDURE — 2720000010 HC SURG SUPPLY STERILE: Performed by: ORTHOPAEDIC SURGERY

## 2021-09-20 PROCEDURE — 2500000003 HC RX 250 WO HCPCS: Performed by: ANESTHESIOLOGY

## 2021-09-20 PROCEDURE — 2500000003 HC RX 250 WO HCPCS: Performed by: ORTHOPAEDIC SURGERY

## 2021-09-20 PROCEDURE — C1776 JOINT DEVICE (IMPLANTABLE): HCPCS | Performed by: ORTHOPAEDIC SURGERY

## 2021-09-20 DEVICE — GENESIS II OVAL RESURFACING                                    PATELLAR 32MM
Type: IMPLANTABLE DEVICE | Site: KNEE | Status: FUNCTIONAL
Brand: GENESIS II

## 2021-09-20 DEVICE — JOURNEY PATELLOFEMORAL IMPLANT                                    X-SMALL LEFT
Type: IMPLANTABLE DEVICE | Site: KNEE | Status: FUNCTIONAL
Brand: JOURNEY

## 2021-09-20 DEVICE — CEMENT BNE 40GM FULL DOSE PMMA W/ GENT HI VISC RADPQ LNG: Type: IMPLANTABLE DEVICE | Site: KNEE | Status: FUNCTIONAL

## 2021-09-20 RX ORDER — LIDOCAINE HYDROCHLORIDE 20 MG/ML
INJECTION, SOLUTION EPIDURAL; INFILTRATION; INTRACAUDAL; PERINEURAL PRN
Status: DISCONTINUED | OUTPATIENT
Start: 2021-09-20 | End: 2021-09-20 | Stop reason: SDUPTHER

## 2021-09-20 RX ORDER — SODIUM CHLORIDE, SODIUM LACTATE, POTASSIUM CHLORIDE, CALCIUM CHLORIDE 600; 310; 30; 20 MG/100ML; MG/100ML; MG/100ML; MG/100ML
INJECTION, SOLUTION INTRAVENOUS CONTINUOUS
Status: DISCONTINUED | OUTPATIENT
Start: 2021-09-20 | End: 2021-09-20 | Stop reason: HOSPADM

## 2021-09-20 RX ORDER — HYDROMORPHONE HCL 110MG/55ML
0.25 PATIENT CONTROLLED ANALGESIA SYRINGE INTRAVENOUS
Status: CANCELLED | OUTPATIENT
Start: 2021-09-20

## 2021-09-20 RX ORDER — SENNA AND DOCUSATE SODIUM 50; 8.6 MG/1; MG/1
1 TABLET, FILM COATED ORAL 2 TIMES DAILY
Status: CANCELLED | OUTPATIENT
Start: 2021-09-20

## 2021-09-20 RX ORDER — CEPHALEXIN 500 MG/1
500 CAPSULE ORAL 2 TIMES DAILY
Qty: 4 CAPSULE | Refills: 0 | Status: SHIPPED | OUTPATIENT
Start: 2021-09-20 | End: 2021-11-03 | Stop reason: ALTCHOICE

## 2021-09-20 RX ORDER — HYDROCODONE BITARTRATE AND ACETAMINOPHEN 5; 325 MG/1; MG/1
1 TABLET ORAL
Status: COMPLETED | OUTPATIENT
Start: 2021-09-20 | End: 2021-09-20

## 2021-09-20 RX ORDER — FENTANYL CITRATE 50 UG/ML
25 INJECTION, SOLUTION INTRAMUSCULAR; INTRAVENOUS EVERY 5 MIN PRN
Status: DISCONTINUED | OUTPATIENT
Start: 2021-09-20 | End: 2021-09-20 | Stop reason: HOSPADM

## 2021-09-20 RX ORDER — PROPOFOL 10 MG/ML
INJECTION, EMULSION INTRAVENOUS PRN
Status: DISCONTINUED | OUTPATIENT
Start: 2021-09-20 | End: 2021-09-20 | Stop reason: SDUPTHER

## 2021-09-20 RX ORDER — SODIUM CHLORIDE, SODIUM LACTATE, POTASSIUM CHLORIDE, CALCIUM CHLORIDE 600; 310; 30; 20 MG/100ML; MG/100ML; MG/100ML; MG/100ML
INJECTION, SOLUTION INTRAVENOUS CONTINUOUS
Status: CANCELLED | OUTPATIENT
Start: 2021-09-20

## 2021-09-20 RX ORDER — ONDANSETRON 2 MG/ML
4 INJECTION INTRAMUSCULAR; INTRAVENOUS EVERY 6 HOURS PRN
Status: CANCELLED | OUTPATIENT
Start: 2021-09-20

## 2021-09-20 RX ORDER — NEOSTIGMINE METHYLSULFATE 5 MG/5 ML
SYRINGE (ML) INTRAVENOUS PRN
Status: DISCONTINUED | OUTPATIENT
Start: 2021-09-20 | End: 2021-09-20 | Stop reason: SDUPTHER

## 2021-09-20 RX ORDER — BUPIVACAINE HYDROCHLORIDE 5 MG/ML
INJECTION, SOLUTION EPIDURAL; INTRACAUDAL
Status: COMPLETED | OUTPATIENT
Start: 2021-09-20 | End: 2021-09-20

## 2021-09-20 RX ORDER — SUCCINYLCHOLINE/SOD CL,ISO/PF 200MG/10ML
SYRINGE (ML) INTRAVENOUS PRN
Status: DISCONTINUED | OUTPATIENT
Start: 2021-09-20 | End: 2021-09-20 | Stop reason: SDUPTHER

## 2021-09-20 RX ORDER — OXYCODONE HYDROCHLORIDE 5 MG/1
5 TABLET ORAL EVERY 4 HOURS PRN
Qty: 40 TABLET | Refills: 0 | Status: SHIPPED | OUTPATIENT
Start: 2021-09-20 | End: 2021-09-27

## 2021-09-20 RX ORDER — SODIUM CHLORIDE 0.9 % (FLUSH) 0.9 %
10 SYRINGE (ML) INJECTION PRN
Status: CANCELLED | OUTPATIENT
Start: 2021-09-20

## 2021-09-20 RX ORDER — LIDOCAINE HYDROCHLORIDE 10 MG/ML
0.5 INJECTION, SOLUTION EPIDURAL; INFILTRATION; INTRACAUDAL; PERINEURAL ONCE
Status: DISCONTINUED | OUTPATIENT
Start: 2021-09-20 | End: 2021-09-20 | Stop reason: HOSPADM

## 2021-09-20 RX ORDER — PROMETHAZINE HYDROCHLORIDE 25 MG/1
12.5 TABLET ORAL EVERY 6 HOURS PRN
Status: CANCELLED | OUTPATIENT
Start: 2021-09-20

## 2021-09-20 RX ORDER — HYDRALAZINE HYDROCHLORIDE 20 MG/ML
5 INJECTION INTRAMUSCULAR; INTRAVENOUS EVERY 10 MIN PRN
Status: DISCONTINUED | OUTPATIENT
Start: 2021-09-20 | End: 2021-09-20 | Stop reason: HOSPADM

## 2021-09-20 RX ORDER — TROSPIUM CHLORIDE 20 MG/1
20 TABLET, FILM COATED ORAL NIGHTLY
Status: CANCELLED | OUTPATIENT
Start: 2021-09-20

## 2021-09-20 RX ORDER — OXYCODONE HYDROCHLORIDE 5 MG/1
5 TABLET ORAL EVERY 4 HOURS PRN
Status: CANCELLED | OUTPATIENT
Start: 2021-09-20

## 2021-09-20 RX ORDER — EPHEDRINE SULFATE 50 MG/ML
INJECTION INTRAVENOUS PRN
Status: DISCONTINUED | OUTPATIENT
Start: 2021-09-20 | End: 2021-09-20 | Stop reason: SDUPTHER

## 2021-09-20 RX ORDER — HYDROMORPHONE HCL 110MG/55ML
0.5 PATIENT CONTROLLED ANALGESIA SYRINGE INTRAVENOUS EVERY 5 MIN PRN
Status: DISCONTINUED | OUTPATIENT
Start: 2021-09-20 | End: 2021-09-20 | Stop reason: HOSPADM

## 2021-09-20 RX ORDER — BUPIVACAINE HYDROCHLORIDE 2.5 MG/ML
INJECTION, SOLUTION EPIDURAL; INFILTRATION; INTRACAUDAL
Status: COMPLETED | OUTPATIENT
Start: 2021-09-20 | End: 2021-09-20

## 2021-09-20 RX ORDER — ONDANSETRON 2 MG/ML
4 INJECTION INTRAMUSCULAR; INTRAVENOUS
Status: DISCONTINUED | OUTPATIENT
Start: 2021-09-20 | End: 2021-09-20 | Stop reason: HOSPADM

## 2021-09-20 RX ORDER — BUPROPION HYDROCHLORIDE 300 MG/1
300 TABLET ORAL EVERY MORNING
Status: CANCELLED | OUTPATIENT
Start: 2021-09-21

## 2021-09-20 RX ORDER — ONDANSETRON 2 MG/ML
INJECTION INTRAMUSCULAR; INTRAVENOUS PRN
Status: DISCONTINUED | OUTPATIENT
Start: 2021-09-20 | End: 2021-09-20 | Stop reason: SDUPTHER

## 2021-09-20 RX ORDER — ACETAMINOPHEN 325 MG/1
650 TABLET ORAL EVERY 6 HOURS
Status: CANCELLED | OUTPATIENT
Start: 2021-09-20

## 2021-09-20 RX ORDER — MELOXICAM 15 MG/1
15 TABLET ORAL DAILY
Qty: 30 TABLET | Refills: 3 | Status: SHIPPED | OUTPATIENT
Start: 2021-09-20 | End: 2021-11-03 | Stop reason: ALTCHOICE

## 2021-09-20 RX ORDER — HYDROMORPHONE HCL 110MG/55ML
0.5 PATIENT CONTROLLED ANALGESIA SYRINGE INTRAVENOUS
Status: CANCELLED | OUTPATIENT
Start: 2021-09-20

## 2021-09-20 RX ORDER — ACETAMINOPHEN 500 MG
1000 TABLET ORAL EVERY 6 HOURS PRN
Qty: 60 TABLET | Refills: 3 | Status: ON HOLD | OUTPATIENT
Start: 2021-09-20 | End: 2021-11-15 | Stop reason: HOSPADM

## 2021-09-20 RX ORDER — M-VIT,TX,IRON,MINS/CALC/FOLIC 27MG-0.4MG
1 TABLET ORAL DAILY
Status: CANCELLED | OUTPATIENT
Start: 2021-09-20

## 2021-09-20 RX ORDER — VALSARTAN 80 MG/1
80 TABLET ORAL DAILY
Status: CANCELLED | OUTPATIENT
Start: 2021-09-20

## 2021-09-20 RX ORDER — PROCHLORPERAZINE EDISYLATE 5 MG/ML
5 INJECTION INTRAMUSCULAR; INTRAVENOUS
Status: DISCONTINUED | OUTPATIENT
Start: 2021-09-20 | End: 2021-09-20 | Stop reason: HOSPADM

## 2021-09-20 RX ORDER — OXYCODONE HYDROCHLORIDE 5 MG/1
10 TABLET ORAL EVERY 4 HOURS PRN
Status: CANCELLED | OUTPATIENT
Start: 2021-09-20

## 2021-09-20 RX ORDER — SODIUM CHLORIDE 0.9 % (FLUSH) 0.9 %
10 SYRINGE (ML) INJECTION EVERY 12 HOURS SCHEDULED
Status: CANCELLED | OUTPATIENT
Start: 2021-09-20

## 2021-09-20 RX ORDER — GLYCOPYRROLATE 1 MG/5 ML
SYRINGE (ML) INTRAVENOUS PRN
Status: DISCONTINUED | OUTPATIENT
Start: 2021-09-20 | End: 2021-09-20 | Stop reason: SDUPTHER

## 2021-09-20 RX ORDER — HYDROMORPHONE HYDROCHLORIDE 2 MG/1
2 TABLET ORAL EVERY 12 HOURS PRN
Qty: 8 TABLET | Refills: 0 | Status: SHIPPED | OUTPATIENT
Start: 2021-09-20 | End: 2021-09-27

## 2021-09-20 RX ORDER — DEXAMETHASONE SODIUM PHOSPHATE 4 MG/ML
INJECTION, SOLUTION INTRA-ARTICULAR; INTRALESIONAL; INTRAMUSCULAR; INTRAVENOUS; SOFT TISSUE PRN
Status: DISCONTINUED | OUTPATIENT
Start: 2021-09-20 | End: 2021-09-20 | Stop reason: SDUPTHER

## 2021-09-20 RX ORDER — ASPIRIN 81 MG/1
81 TABLET ORAL 2 TIMES DAILY
Qty: 60 TABLET | Refills: 0 | Status: SHIPPED | OUTPATIENT
Start: 2021-09-20 | End: 2021-11-03 | Stop reason: ALTCHOICE

## 2021-09-20 RX ORDER — LABETALOL HYDROCHLORIDE 5 MG/ML
5 INJECTION, SOLUTION INTRAVENOUS EVERY 10 MIN PRN
Status: DISCONTINUED | OUTPATIENT
Start: 2021-09-20 | End: 2021-09-20 | Stop reason: HOSPADM

## 2021-09-20 RX ORDER — FENTANYL CITRATE 50 UG/ML
INJECTION, SOLUTION INTRAMUSCULAR; INTRAVENOUS PRN
Status: DISCONTINUED | OUTPATIENT
Start: 2021-09-20 | End: 2021-09-20 | Stop reason: SDUPTHER

## 2021-09-20 RX ORDER — ROCURONIUM BROMIDE 10 MG/ML
INJECTION, SOLUTION INTRAVENOUS PRN
Status: DISCONTINUED | OUTPATIENT
Start: 2021-09-20 | End: 2021-09-20 | Stop reason: SDUPTHER

## 2021-09-20 RX ORDER — SODIUM CHLORIDE 9 MG/ML
25 INJECTION, SOLUTION INTRAVENOUS PRN
Status: CANCELLED | OUTPATIENT
Start: 2021-09-20

## 2021-09-20 RX ADMIN — Medication 100 MG: at 13:38

## 2021-09-20 RX ADMIN — HYDROCODONE BITARTRATE AND ACETAMINOPHEN 1 TABLET: 5; 325 TABLET ORAL at 16:00

## 2021-09-20 RX ADMIN — FENTANYL CITRATE 50 MCG: 50 INJECTION, SOLUTION INTRAMUSCULAR; INTRAVENOUS at 14:53

## 2021-09-20 RX ADMIN — Medication 0.2 MG: at 14:11

## 2021-09-20 RX ADMIN — PROPOFOL 150 MG: 10 INJECTION, EMULSION INTRAVENOUS at 13:38

## 2021-09-20 RX ADMIN — BUPIVACAINE HYDROCHLORIDE 20 ML: 5 INJECTION, SOLUTION EPIDURAL; INTRACAUDAL at 11:41

## 2021-09-20 RX ADMIN — DEXAMETHASONE SODIUM PHOSPHATE 10 MG: 4 INJECTION, SOLUTION INTRAMUSCULAR; INTRAVENOUS at 13:45

## 2021-09-20 RX ADMIN — Medication 3.5 MG: at 14:49

## 2021-09-20 RX ADMIN — FENTANYL CITRATE 50 MCG: 50 INJECTION, SOLUTION INTRAMUSCULAR; INTRAVENOUS at 13:38

## 2021-09-20 RX ADMIN — Medication 0.1 MG: at 15:01

## 2021-09-20 RX ADMIN — CEFAZOLIN SODIUM 2000 MG: 10 INJECTION, POWDER, FOR SOLUTION INTRAVENOUS at 13:03

## 2021-09-20 RX ADMIN — TRANEXAMIC ACID 1000 MG: 1 INJECTION, SOLUTION INTRAVENOUS at 13:02

## 2021-09-20 RX ADMIN — TRANEXAMIC ACID 1000 MG: 1 INJECTION, SOLUTION INTRAVENOUS at 14:40

## 2021-09-20 RX ADMIN — Medication 1 MG: at 15:01

## 2021-09-20 RX ADMIN — BUPIVACAINE HYDROCHLORIDE AND EPINEPHRINE BITARTRATE: 2.5; .005 INJECTION, SOLUTION EPIDURAL; INFILTRATION; INTRACAUDAL; PERINEURAL at 14:43

## 2021-09-20 RX ADMIN — ONDANSETRON 4 MG: 2 INJECTION INTRAMUSCULAR; INTRAVENOUS at 13:46

## 2021-09-20 RX ADMIN — EPHEDRINE SULFATE 10 MG: 50 INJECTION, SOLUTION INTRAVENOUS at 13:52

## 2021-09-20 RX ADMIN — SODIUM CHLORIDE, POTASSIUM CHLORIDE, SODIUM LACTATE AND CALCIUM CHLORIDE: 600; 310; 30; 20 INJECTION, SOLUTION INTRAVENOUS at 14:54

## 2021-09-20 RX ADMIN — ROCURONIUM BROMIDE 30 MG: 10 INJECTION, SOLUTION INTRAVENOUS at 13:44

## 2021-09-20 RX ADMIN — SODIUM CHLORIDE, POTASSIUM CHLORIDE, SODIUM LACTATE AND CALCIUM CHLORIDE: 600; 310; 30; 20 INJECTION, SOLUTION INTRAVENOUS at 13:33

## 2021-09-20 RX ADMIN — LIDOCAINE HYDROCHLORIDE 100 MG: 20 INJECTION, SOLUTION EPIDURAL; INFILTRATION; INTRACAUDAL; PERINEURAL at 13:38

## 2021-09-20 RX ADMIN — HYDROMORPHONE HYDROCHLORIDE 0.5 MG: 2 INJECTION, SOLUTION INTRAMUSCULAR; INTRAVENOUS; SUBCUTANEOUS at 15:46

## 2021-09-20 RX ADMIN — BUPIVACAINE HYDROCHLORIDE 20 ML: 2.5 INJECTION, SOLUTION EPIDURAL; INFILTRATION; INTRACAUDAL; PERINEURAL at 11:42

## 2021-09-20 RX ADMIN — Medication 0.7 MG: at 14:49

## 2021-09-20 ASSESSMENT — PULMONARY FUNCTION TESTS
PIF_VALUE: 13
PIF_VALUE: 13
PIF_VALUE: 14
PIF_VALUE: 13
PIF_VALUE: 14
PIF_VALUE: 15
PIF_VALUE: 15
PIF_VALUE: 13
PIF_VALUE: 2
PIF_VALUE: 14
PIF_VALUE: 14
PIF_VALUE: 13
PIF_VALUE: 14
PIF_VALUE: 1
PIF_VALUE: 15
PIF_VALUE: 7
PIF_VALUE: 14
PIF_VALUE: 12
PIF_VALUE: 13
PIF_VALUE: 14
PIF_VALUE: 14
PIF_VALUE: 1
PIF_VALUE: 1
PIF_VALUE: 14
PIF_VALUE: 10
PIF_VALUE: 13
PIF_VALUE: 2
PIF_VALUE: 13
PIF_VALUE: 13
PIF_VALUE: 14
PIF_VALUE: 14
PIF_VALUE: 3
PIF_VALUE: 14
PIF_VALUE: 13
PIF_VALUE: 13
PIF_VALUE: 14
PIF_VALUE: 14
PIF_VALUE: 13
PIF_VALUE: 14
PIF_VALUE: 14
PIF_VALUE: 15
PIF_VALUE: 14
PIF_VALUE: 1
PIF_VALUE: 13
PIF_VALUE: 14
PIF_VALUE: 13
PIF_VALUE: 14
PIF_VALUE: 15
PIF_VALUE: 13
PIF_VALUE: 13
PIF_VALUE: 14
PIF_VALUE: 1
PIF_VALUE: 14
PIF_VALUE: 14
PIF_VALUE: 13
PIF_VALUE: 14
PIF_VALUE: 19
PIF_VALUE: 14
PIF_VALUE: 13
PIF_VALUE: 1
PIF_VALUE: 14
PIF_VALUE: 13
PIF_VALUE: 13
PIF_VALUE: 15
PIF_VALUE: 13
PIF_VALUE: 14
PIF_VALUE: 0
PIF_VALUE: 14
PIF_VALUE: 9
PIF_VALUE: 14
PIF_VALUE: 2
PIF_VALUE: 14
PIF_VALUE: 1
PIF_VALUE: 13
PIF_VALUE: 14
PIF_VALUE: 11
PIF_VALUE: 14
PIF_VALUE: 13
PIF_VALUE: 14
PIF_VALUE: 14
PIF_VALUE: 13
PIF_VALUE: 14

## 2021-09-20 ASSESSMENT — PAIN - FUNCTIONAL ASSESSMENT
PAIN_FUNCTIONAL_ASSESSMENT: 0-10
PAIN_FUNCTIONAL_ASSESSMENT: PREVENTS OR INTERFERES SOME ACTIVE ACTIVITIES AND ADLS
PAIN_FUNCTIONAL_ASSESSMENT: ACTIVITIES ARE NOT PREVENTED

## 2021-09-20 ASSESSMENT — PAIN SCALES - GENERAL
PAINLEVEL_OUTOF10: 3
PAINLEVEL_OUTOF10: 0
PAINLEVEL_OUTOF10: 5
PAINLEVEL_OUTOF10: 1

## 2021-09-20 ASSESSMENT — PAIN DESCRIPTION - PROGRESSION
CLINICAL_PROGRESSION: GRADUALLY IMPROVING
CLINICAL_PROGRESSION: GRADUALLY WORSENING
CLINICAL_PROGRESSION: GRADUALLY IMPROVING

## 2021-09-20 ASSESSMENT — PAIN DESCRIPTION - PAIN TYPE
TYPE: SURGICAL PAIN

## 2021-09-20 ASSESSMENT — PAIN DESCRIPTION - ONSET
ONSET: GRADUAL

## 2021-09-20 ASSESSMENT — PAIN DESCRIPTION - LOCATION
LOCATION: KNEE

## 2021-09-20 ASSESSMENT — PAIN DESCRIPTION - DESCRIPTORS
DESCRIPTORS: ACHING

## 2021-09-20 ASSESSMENT — PAIN DESCRIPTION - ORIENTATION
ORIENTATION: LEFT

## 2021-09-20 ASSESSMENT — PAIN DESCRIPTION - FREQUENCY
FREQUENCY: CONTINUOUS

## 2021-09-20 ASSESSMENT — LIFESTYLE VARIABLES: SMOKING_STATUS: 0

## 2021-09-20 NOTE — PROGRESS NOTES
Dr. Cathie Fonseca aware that patient will not be able to have a PT/OT evaluation due to it being a late case. Patient alert and oriented. Patient assisted to bathroom with 2 nurses, gait belt and rolling walker. minimal assist needed to bathroom. Approx 50 feet total with steady gait. Patient voided without difficulty. Patient's has 2 steps into home and has a bedroom on the main level. Directions given for knee positioning, exercises, stair instructions. Reported minimal pain. Reviewed discharge, follow up, and medication instructions with patient and family member. Patient states understanding. Educated patient  to wear nael hose for 2 weeks and to remove at night and use incentive spirometer and take all medication as directed. Patient given instructions that due to state law, pain medication was written for 7 days and cannot be filled early. Instructed patient to take small dose of narcotic as possible and call the office if refill is needed after 7 days.  has prescriptions. Patient has Rehan Herrera RN at bedside.

## 2021-09-20 NOTE — PROGRESS NOTES
Pt assisted with dressing and ambulation into wheel chair. Pt discharged via wheel chair in stable condition to  to be transported home.

## 2021-09-20 NOTE — ANESTHESIA POSTPROCEDURE EVALUATION
Department of Anesthesiology  Postprocedure Note    Patient: Singh Knutson  MRN: [id number]  YOB: 1960  Date of evaluation: 9/20/2021  Time:  3:21 PM     Procedure Summary     Date: 09/20/21 Room / Location: [address]    Anesthesia Start: 6402 Anesthesia Stop: 9913    Procedure: OPEN PATELLOFEMORAL  ARTHROPLASTY WITH ROBOTIC NAVIGATION- LEFT KNEE  [id number] (Left Knee) Diagnosis:       (M22.42  Nonda Marine)      (Y89.476C  MEDIAL MENISCUS TEAR)    Surgeons: Kathleen Figueredo MD Responsible Provider: Daljit Clark MD    Anesthesia Type: general, regional ASA Status: 3          Anesthesia Type: general, regional    Nabila Phase I:      Nabila Phase II:      Last vitals: Reviewed and per EMR flowsheets.        Anesthesia Post Evaluation    Patient location during evaluation: PACU  Patient participation: complete - patient participated  Level of consciousness: awake and alert  Airway patency: patent  Nausea & Vomiting: no vomiting and no nausea  Complications: no  Cardiovascular status: hemodynamically stable  Respiratory status: acceptable  Hydration status: stable

## 2021-09-20 NOTE — ANESTHESIA PROCEDURE NOTES
Peripheral Block    Patient location during procedure: pre-op  Start time: 9/20/2021 11:35 AM  End time: 9/20/2021 11:38 AM  Staffing  Performed: anesthesiologist   Anesthesiologist: Abdoul Bates MD  Preanesthetic Checklist  Completed: patient identified, IV checked, site marked, risks and benefits discussed, surgical consent, monitors and equipment checked, pre-op evaluation, timeout performed, anesthesia consent given, oxygen available and patient being monitored  Peripheral Block  Patient position: supine  Prep: ChloraPrep  Patient monitoring: cardiac monitor, continuous pulse ox, frequent blood pressure checks and IV access  Block type: Femoral  Laterality: left  Injection technique: single-shot  Guidance: ultrasound guided  Local infiltration: lidocaine  Infiltration strength: 1 %  Dose: 1 mL  Adductor canal  Provider prep: mask and sterile gloves  Local infiltration: lidocaine  Needle  Needle type: short-bevel   Needle gauge: 20 G  Needle length: 10 cm  Needle localization: ultrasound guidance and anatomical landmarks  Assessment  Injection assessment: negative aspiration for heme, no paresthesia on injection and local visualized surrounding nerve on ultrasound  Paresthesia pain: none  Slow fractionated injection: yes  Hemodynamics: stable  Additional Notes  U/S [id number].  (1) Under ultrasound guidance, a 20 gauge needle was inserted and placed in close proximity to the ac nerve.  (2) Ultrasound was also used to visualize the spread of the anesthetic in close proximity to the nerve being blocked. (3) The nerve appeared anatomically normal, and (4 there were no apparent abnormal pathological findings on the image that were readily visible and related to the nerve being blocked. (5) A permanent ultrasound image was saved in the patient's record.           Medications Administered  Bupivacaine (MARCAINE) PF injection 0.5%, 20 mL  Reason for block: post-op pain management and at surgeon's request

## 2021-09-20 NOTE — PROGRESS NOTES
ana RN nurse navigator called no answere, voice mail  left to come see pt and review discharge information. PT called to come evaluate and see pt.

## 2021-09-20 NOTE — ANESTHESIA PROCEDURE NOTES
Peripheral Block    Patient location during procedure: pre-op  Start time: 9/20/2021 11:30 AM  End time: 9/20/2021 11:32 AM  Staffing  Performed: anesthesiologist   Anesthesiologist: Tanmay Andujar MD  Preanesthetic Checklist  Completed: patient identified, IV checked, site marked, risks and benefits discussed, surgical consent, monitors and equipment checked, pre-op evaluation, timeout performed, anesthesia consent given, oxygen available and patient being monitored  Peripheral Block  Patient position: right lateral decubitus  Prep: ChloraPrep  Patient monitoring: cardiac monitor, continuous pulse ox, frequent blood pressure checks and IV access  Block type: iPacks  Laterality: left  Injection technique: single-shot  Guidance: ultrasound guided  Local infiltration: lidocaine  Infiltration strength: 1 %  Dose: 1 mL  Provider prep: mask and sterile gloves  Local infiltration: lidocaine  Needle  Needle type: short-bevel   Needle gauge: 20 G  Needle length: 10 cm  Needle localization: ultrasound guidance and anatomical landmarks  Assessment  Injection assessment: negative aspiration for heme, no paresthesia on injection and local visualized surrounding nerve on ultrasound  Paresthesia pain: none  Slow fractionated injection: yes  Hemodynamics: stable  Additional Notes  U/S [id number].  (1) Under ultrasound guidance, a 20 gauge needle was inserted and placed in close proximity to the ipack nerve.  (2) Ultrasound was also used to visualize the spread of the anesthetic in close proximity to the nerve being blocked. (3) The nerve appeared anatomically normal, and (4 there were no apparent abnormal pathological findings on the image that were readily visible and related to the nerve being blocked. (5) A permanent ultrasound image was saved in the patient's record.           Medications Administered  Bupivacaine (MARCAINE) PF injection 0.25%, 20 mL  Reason for block: post-op pain management and at surgeon's request

## 2021-09-20 NOTE — PROGRESS NOTES
Pt  brought back to bedside to sit with pt. Pt medicated with PO pain medication, pt tolerating PO fluids well, no report or nausea.

## 2021-09-20 NOTE — DISCHARGE INSTR - COC
Continuity of Care Form    Patient Name: Wesley Urias   :  1960  MRN:  [id number]    Admit date:  2021  Discharge date:  ***    Code Status Order: No Order   Advance Directives:   Advance Care Flowsheet Documentation     Date/Time Healthcare Directive Type of Healthcare Directive Copy in [address] Agent's Name Healthcare Agent's Phone Number    21 1057  Yes, patient has an advance directive for healthcare treatment  Living will;Durable power of  for health care  No, copy requested from family  --  --  lefty [phone]    21 1310  Yes, patient has an advance directive for healthcare treatment  Durable power of  for health care;Living will  --  --  --  --          Admitting Physician:  Katherine Chavarria MD  PCP: LANCE Eddy CNP    Discharging Nurse: [facility] Unit/Room#: [address] [facility] Phone Number: ***    Emergency Contact:   Extended Emergency Contact Information  Primary Emergency Contact: Lefty Velasquez  Address: [address] [place] [address] [address] Phone: [phone]  Relation: Spouse    Past Surgical History:  Past Surgical History:   Procedure Laterality Date    APPENDECTOMY  2021    BLADDER SURGERY      with hysterectomy-vaginal approach    BREAST SURGERY  2006    benign cyst on left    CARDIAC CATHETERIZATION  2021    COLONOSCOPY  2012    Dr Rohit Baxter      facial 12.11    FACIAL COSMETIC SURGERY      HYSTERECTOMY  1996    SALPINGO-OOPHORECTOMY      still with left ovary       Immunization History:   Immunization History   Administered Date(s) Administered    COVID-19, Pfizer, PF, 30mcg/0.3mL 2021, 2021    Influenza, Quadv, IM, PF (6 mo and older Fluzone, Flulaval, Fluarix, and 3 yrs and older Afluria) 11/15/2018, 2021    Tdap (Boostrix, Adacel) 2013    Zoster Recombinant (Shingrix) 2021       Active Problems:  Patient Active Problem List   Diagnosis Code    Hemorrhoid K64.9    Nevus D22.9    Lipid disorder E78.9    Fibrocystic disease of breast N60.19    Acne necrotica L70.2    Depression F32.9    GERD (gastroesophageal reflux disease) K21.9    Vitamin D deficiency E55.9    Postmenopausal osteoporosis M81.0    Chronic pain of both knees M25.561, M25.562, G89.29    Hyperlipidemia E78.5    Chronic pain of both shoulders M25.511, G89.29, M25.512    Chronic left-sided low back pain without sciatica M54.5, G89.29    NSTEMI (non-ST elevated myocardial infarction) (HCC) I21.4    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    History of colonic polyps Z86.010    Primary osteoarthritis of left knee M17.12       Isolation/Infection:   Isolation          No Isolation        Patient Infection Status     Infection Onset Added Last Indicated Last Indicated By Review Planned Expiration Resolved Resolved By    C-diff Rule Out 21 Clostridium Difficile Toxin/Antigen (Ordered)              Nurse Assessment:  Last Vital Signs: /68   Pulse 68   Temp 97.6 °F (36.4 °C) (Temporal)   Resp 16   Ht 5' 9\" (1.753 m)   Wt 162 lb (73.5 kg)   SpO2 98%   BMI 23.92 kg/m²     Last documented pain score (0-10 scale): Pain Level: 3  Last Weight:   Wt Readings from Last 1 Encounters:   21 162 lb (73.5 kg)     Mental Status:  {IP PT MENTAL STATUS:43338}    IV Access:  { ROSIE IV ACCESS:108312205}    Nursing Mobility/ADLs:  Walking   {P DME WRTZ:216950630}  Transfer  {P DME TQPD:646418254}  Bathing  {P DME RMQW:061418950}  Dressing  {P DME NKFJ:334751577}  Toileting  {P DME QRMN:112439729}  Feeding  {P DME LDUM:330657555}  Med Admin  {[facility] DME GBV}  Med Delivery   {[facility] MED Delivery:916240435}    Wound Care Documentation and Therapy:        Elimination:  Continence:   · Bowel: {YES / YL:64693}  · Bladder: {YES / BENITO:97123}  Urinary Catheter: {Urinary Catheter:175839959}   Colostomy/Ileostomy/Ileal Conduit: {YES / SM:80142}       Date of Last BM: ***    Intake/Output Summary (Last 24 hours) at 2021 1513  Last data filed at 2021 1511  Gross per 24 hour   Intake 1050 ml   Output 15 ml   Net 1035 ml     I/O last 3 completed shifts:   In: 0 [I.V.:1000; IV Piggyback:50]  Out: -     Safety Concerns:     [address] Safety Concerns:219115280}    Impairments/Disabilities:      [facility] Impairments/Disabilities:558521310}    Nutrition Therapy:  Current Nutrition Therapy:   [facility] Diet List:905528076}    Routes of Feeding: {[facility] DME Other Feedings:807066340}  Liquids: {Slp liquid thickness:59546}  Daily Fluid Restriction: {CHP DME Yes amt example:535901420}  Last Modified Barium Swallow with Video (Video Swallowing Test): {Done Not Done GDJV:032294135}    Treatments at the Time of Hospital Discharge:   Respiratory Treatments: ***  Oxygen Therapy:  {Therapy; copd oxygen:96392}  Ventilator:    {[facility] Vent RXPI:266683846}    Rehab Therapies: {THERAPEUTIC INTERVENTION:4359440317}  Weight Bearing Status/Restrictions: [facility] Weight Bearin}  Other Medical Equipment (for information only, NOT a DME order):  {EQUIPMENT:411197257}  Other Treatments: ***    Patient's personal belongings (please select all that are sent with patient):  {[facility] DME Belongings:831024040}    RN SIGNATURE:  {Esignature:766855283}    CASE MANAGEMENT/SOCIAL WORK SECTION    Inpatient Status Date: ***    Readmission Risk Assessment Score:  Readmission Risk              Risk of Unplanned Readmission:  0           Discharging to Facility/ Agency   · Name:   · Address:  · Phone:  · Fax:    Dialysis Facility (if applicable)   · Name:  · Address:  · Dialysis Schedule:  · Phone:  · Fax:    / signature: {Esignature:425554366}    PHYSICIAN SECTION    Prognosis: {Prognosis:4920691228}    Condition at Discharge: [facility] Patient Condition:895298365}    Rehab Potential (if transferring to Rehab): {Prognosis:8513394215}    Recommended Labs or Other Treatments After Discharge: ***    Physician Certification: I certify the above information and transfer of Marycruz Dove  is necessary for the continuing treatment of the diagnosis listed and that she requires {Admit to Appropriate Level of Care:51883} for {GREATER/LESS:182005649} 30 days.      Update Admission H&P: [facility]}    PHYSICIAN SIGNATURE:  {Esignature:363320205}

## 2021-09-20 NOTE — PROGRESS NOTES
Pt arrived from OR, report from crna/rn. Pt had left knee surgery, left knee dressing clean dry and intact, ace wrap in place, ice pack placed on left knee. Pt awake and alert upon arrival to pacu, respirations even unlabored, simple mask 6 liters in place. VSS. outpt pharmacy delivered pt medications to bedside for pt use at home.

## 2021-09-20 NOTE — PROGRESS NOTES
Ashwini LEIGH ortho navigator call back, states she will be down to review discharge instructions. PT will not be able to come to bedside to evaluate before she is discharged home, CONSUELO CHOWDARY [facility] manager notified. Dr Kelton Mora to be notified per [facility].

## 2021-09-20 NOTE — H&P
Date of Surgery Update:  Lynder Sever was seen, history and physical examination reviewed, and patient examined by me today. There have been no significant clinical changes since the completion of the previous history and physical.    The risk, benefits, and alternatives of the proposed procedure have been explained to the patient (or appropriate guardian) and understanding verbalized. All questions answered. Patient wishes to proceed.     Electronically signed by: Johana Rao MD,9/20/2021,12:51 PM

## 2021-09-20 NOTE — PROGRESS NOTES
ana RN ortho navigator at bedside to speak with pt and review discharge instructions. Pt  at bedside with pt. Pt awake and alert, pain controlled.  Dr Haylie Cheatham aware PT will not be evaluating pt prior to discharge and pt cleared pt to be discharged home after ambulation with walker and RN assist.

## 2021-09-20 NOTE — ANESTHESIA PRE PROCEDURE
Department of Anesthesiology  Preprocedure Note       Name:  Haroldo Dial   Age:  62 y.o.  :  1960                                          MRN:  [id number]         Date:  2021      Surgeon: Joelle Archuleta):  Niles Miles MD    Procedure: Procedure(s):  OPEN PATELLOFEMORAL  ARTHROPLASTY WITH ROBOTIC NAVIGATION- LEFT KNEE  - Roylene Sermon & NEPHEW ([id number])    Medications prior to admission:   Prior to Admission medications    Medication Sig Start Date End Date Taking? Authorizing Provider   MAGNESIUM-POTASSIUM PO Take by mouth daily   Yes Historical Provider, MD   Calcium Carbonate-Vit D-Min (CALCIUM 1200 PO) Take by mouth daily   Yes Historical Provider, MD   Multiple Vitamins-Minerals (THERAPEUTIC MULTIVITAMIN-MINERALS) tablet Take 1 tablet by mouth daily   Yes Historical Provider, MD   traZODone (DESYREL) 100 MG tablet TAKE 2 TABLETS BY MOUTH EVERY NIGHT  Patient taking differently:  Only taking 1 tablet day 21  Yes LANCE Pires CNP   Estradiol (YUVAFEM) 10 MCG TABS vaginal tablet Place 1 tablet vaginally Twice a Week 20  Yes Briana Chang MD   esomeprazole (NEXIUM) 40 MG delayed release capsule Take 1 capsule by mouth every morning (before breakfast) 20  Yes LANCE Pires CNP   pregabalin (LYRICA) 100 MG capsule TAKE 1 CAPSULE BY MOUTH EVERY DAY 9/16/21 3/16/22  LANCE Pires CNP   MYRBETRIQ 50 MG TB24 TAKE 1 TABLET BY MOUTH EVERY DAY 21   Historical Provider, MD   buPROPion (WELLBUTRIN XL) 300 MG extended release tablet Take 1 tablet by mouth every morning 21   LANCE Pires CNP   valsartan (DIOVAN) 80 MG tablet Take 1 tablet by mouth daily 21   LANCE Pires CNP   Omega-3 Fatty Acids (FISH OIL PO) Take by mouth    Historical Provider, MD   Cholecalciferol (VITAMIN D PO) Take by mouth    Historical Provider, MD       Current medications:    Current Facility-Administered Medications   Medication Dose Route Frequency Provider Last Rate Last Admin    lactated ringers infusion   IntraVENous Continuous Johana Rao MD        lidocaine PF 1 % injection 0.5 mL  0.5 mL IntraDERmal Once Johana Rao MD        ceFAZolin (ANCEF) 2000 mg in dextrose 5 % 50 mL IVPB  2,000 mg IntraVENous On Call to [address], MD        ortho mix injection   Injection On Call Johana Rao MD        tranexamic acid (CYKLOKAPRON) 1,000 mg in sodium chloride 0.9 % 50 mL IVPB  1,000 mg IntraVENous Once Johana Rao MD        tranexamic acid (CYKLOKAPRON) 1,000 mg in sodium chloride 0.9 % 50 mL IVPB  1,000 mg IntraVENous On Call to [address] MD           Allergies:     Allergies   Allergen Reactions    Lisinopril      Cough       Problem List:    Patient Active Problem List   Diagnosis Code    Hemorrhoid K64.9    Nevus D22.9    Lipid disorder E78.9    Fibrocystic disease of breast N60.19    Acne necrotica L70.2    Depression F32.9    GERD (gastroesophageal reflux disease) K21.9    Vitamin D deficiency E55.9    Postmenopausal osteoporosis M81.0    Chronic pain of both knees M25.561, M25.562, G89.29    Hyperlipidemia E78.5    Chronic pain of both shoulders M25.511, G89.29, M25.512    Chronic left-sided low back pain without sciatica M54.5, G89.29    NSTEMI (non-ST elevated myocardial infarction) (HCC) I21.4    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    History of colonic polyps Z86.010       Past Medical History:        Diagnosis Date    Alcoholism ([facility] [address]     Recovering    Chronic left-sided low back pain without sciatica 5/26/2020    Degenerative joint disease     Depression     Fibrocystic disease of breast 6/15/2010    GERD (gastroesophageal reflux disease)     Headache(784.0)     Hemorrhoid 6/15/2010    Hyperlipidemia 6/25/2019    Mitral valve regurgitation     Osteoporosis     Screening mammogram, encounter for 02/04/2018    Dr Joon Li MD    Urinary incontinence     Vitamin D deficiency 1.9 04/18/2019    LABGLOM >60 09/11/2021    GLUCOSE 88 09/11/2021    PROT 6.9 04/18/2019    PROT 6.9 03/13/2012    CALCIUM 9.8 09/11/2021    BILITOT 0.5 04/18/2019    ALKPHOS 65 04/18/2019    AST 15 04/18/2019    ALT 11 04/18/2019       POC Tests: No results for input(s): POCGLU, POCNA, POCK, POCCL, POCBUN, POCHEMO, POCHCT in the last 72 hours.     Coags:   Lab Results   Component Value Date    PROTIME 11.4 09/11/2021    INR 1.01 09/11/2021    APTT 38.0 09/11/2021       HCG (If Applicable): No results found for: PREGTESTUR, PREGSERUM, HCG, HCGQUANT     ABGs: No results found for: PHART, PO2ART, LIG7QFG, KCX0FVA, BEART, F5LSEERF     Type & Screen (If Applicable):  No results found for: LABABO, LABRH    Drug/Infectious Status (If Applicable):  No results found for: HIV, HEPCAB    COVID-19 Screening (If Applicable): No results found for: COVID19        Anesthesia Evaluation  Patient summary reviewed and Nursing notes reviewed no history of anesthetic complications:   Airway: Mallampati: II  TM distance: >3 FB   Neck ROM: full  Mouth opening: > = 3 FB Dental: normal exam         Pulmonary:normal exam  breath sounds clear to auscultation      (-) COPD, asthma, sleep apnea and not a current smoker                           Cardiovascular:    (+) hypertension:, past MI (NSTEMI 1/21):, CHF (NICM, echo 4/21 EF 45 no RWMA gr 1 DD; no current s/s of failure or acs, compensated and stable):, hyperlipidemia    (-) CAD (neg cath 1/21), CABG/stent, dysrhythmias and  angina    ECG reviewed  Rhythm: regular  Rate: normal  Echocardiogram reviewed                  Neuro/Psych:   (+) neuromuscular disease (chronic lbp):, headaches:, psychiatric history (Alcoholism hx, recovering):depression/anxiety    (-) seizures, TIA and CVA           GI/Hepatic/Renal:   (+) GERD: well controlled,      (-) liver disease and no renal disease       Endo/Other:    (+) : arthritis: OA., .    (-) diabetes mellitus, hypothyroidism, hyperthyroidism               Abdominal:             Vascular: Other Findings:           Anesthesia Plan      general and regional     ASA 3     (Consented for ipack and adductor canal block, risks/benefits discussed including nerve damage, bleeding, infection, and local anesthetic toxicity, patient understanding and wishes to proceed)  Induction: intravenous. MIPS: Postoperative opioids intended and Prophylactic antiemetics administered. Anesthetic plan and risks discussed with patient. Plan discussed with CRNA.                   Casandra Lockwood MD   9/20/2021

## 2021-09-21 ENCOUNTER — TELEPHONE (OUTPATIENT)
Dept: INPATIENT UNIT | Age: 61
End: 2021-09-21

## 2021-09-21 NOTE — OP NOTE
[facility]                     [address] [facility], [facility]                                OPERATIVE REPORT    PATIENT NAME: Anuj CHAU                      :        1960  MED REC NO:   [id number]                          ROOM:  ACCOUNT NO:   [de-identified]                           ADMIT DATE: 2021  PROVIDER:     Siena Virk MD    DATE OF PROCEDURE:  2021    PREOPERATIVE DIAGNOSIS:  Left knee patellofemoral arthritis. POSTOPERATIVE DIAGNOSIS:  Left knee patellofemoral arthritis. OPERATION PERFORMED:  Left knee patellofemoral joint replacement under  robotic navigation. PRIMARY SURGEON:  Siena Virk MD    FIRST ASSISTANT:  JANNETTE Verdugo    ANESTHESIA:  General endotracheal.  The patient also did receive an  adductor canal block. IMPLANT:  [employer] II extra small patellofemoral joint  with a size 32 mm oval dome patella. TOURNIQUET TIME:  44 minutes at 300 mmHg. BLOOD LOSS:  100 mL. CONDITION:  The patient postoperatively was stable. HISTORY:  The patient is a 61-year-old female with a longstanding  history of primarily patellofemoral arthritis in the left knee that has  been treated for a long time with just viscosupplementation injections  and occasional cortisone injections and physical therapy. The pain has  gotten progressively worse to now where she is very limited with  activity. We discussed what her further treatment options would be and  we did recommend patellofemoral replacement. After discussing the risks  and benefits of the procedure with her, informed consent was obtained. OPERATIVE PROCEDURE:  The patient was seen in the preoperative holding  area. The left knee was confirmed to be the operative extremity, it was  marked at that period of time. She did receive 2 gm of Ancef  preoperatively. She was then brought back to the operating room in  supine position.   General endotracheal anesthesia was started by the  Anesthesia Service. She was positioned supine on the operating room  table with all bony prominences padded. Tourniquet was placed upon the  patient's left thigh and the left lower extremity at that time was  prepped and draped in the standard sterile fashion. A standard anterior midline incision was made directly over the anterior  aspect of the left knee. It was carried down sharply through the skin  and subcutaneous tissues. A medial parapatellar arthrotomy at that time  was performed. Patella was everted laterally. Fat pad was excised and  the patellofemoral joint was now fully exposed. We did look at the  entire joint. Medial and lateral articular surfaces and meniscus were  found to be intact. There were areas of grade IV chondromalacia within  the trochlear groove especially along the medial trochlear groove as  well as under the lateral facet of the patella. We then at that point  in time placed our femoral trackers for the robotic navigation. We  collected our data including the hip center, the knee center, the medial  and lateral epicondylar points as well as Hamilton's line. We then  created our digital image of the trochlear groove for surgical planning. We came up with our surgical plan at that point in time. We then burred  out the trochlear groove and our anterior cortical cut for our implant. We then placed the trial implant on for marking our rotation and  position of the implant. We made sure that we were happy with the  location of the implant. We confirmed that our rotation and placement  was consistent with our operative plan. We then drilled our four peg  holes for our trochlear implant. We then placed the trochlear trial.   We then measured the patella. We made a resection on the back surface  of the patella to cut it to a thickness of 13 mm. We then reamed lug  holes for a size 32 oval dome patella.   We then placed our trial patella  on and took the knee through range of motion. We had excellent patellar  tracking centrally within the groove and good patellofemoral stability. The trial components were removed from the knee at that time. The knee  was washed with an IrriSept antibacterial solution. It was washed with  pulse lavage as well as the soft tissues were injected with an Orthomix  local anesthetic solution while the cement was being mixed on the back  table. Once the cement was ready, we cemented our final components which  included an extra small patellofemoral joint replacement followed by a  size 32 mm oval dome patella. We waited for the cement to cure and any  excess cement was removed from the knee at that time. Once the cement  fully cured, we once again trialed the knee, which demonstrated  excellent patellar tracking. The tourniquet at that time was deflated  and electrocautery was used to obtain hemostasis within the soft  tissues. Closure at that time was begun with a #1 Stratafix suture, 2-0  Vicryl was used to reapproximate the subcutaneous tissues and a 4-0  Monocryl was used to close the skin. Sterile dressings were placed over  the knee. Knee was placed into an Ace wrap. The patient at that time  was awakened from anesthesia and transferred to PACU in stable  condition.         Krys Royal MD    D: 09/20/2021 15:49:28       T: 09/21/2021 2:55:06     [email or web address]  Job#: [id number]     Doc#: [id number]    CC:

## 2021-09-21 NOTE — TELEPHONE ENCOUNTER
Attempted to contact patient. Left voicemail for patient stating purpose and call back number.    Jensen Cabezas  Orthopedic Nurse Navigator  Phone number: [phone]  Future Appointments   Date Time Provider Gissel Aleman   12/13/2021 11:00 AM Angie Landaverde MD [facility]

## 2021-09-22 NOTE — TELEPHONE ENCOUNTER
Spoke with patient  regarding post discharge from hospital.    Incision status: no drainage, odor, or redness noted per patient    Edema/Swelling:  :moderate    Wearing Teds: yes    Pain level and status: controlled, is currently weaning off the narcotics now. Use of pain medications: oxycodone and dilaudid; Patient stated they are taking their pain medication as prescribed. Reminded patient due to state law, we will be unable to refill pain pills early, so takes smallest dose possible and call the office if prescription refill is needed past 7 days. Use of ice therapy: Yes     Blood thinner: ASPIRIN ; Verified with patient that they are taking their anticoagulant as prescribed 2 a day. Bowels: No: taking miralax     Outpatient therapy: yes. Went to therapy yesterday, looks good, no dressing changed needed. Do you have all of your medications: Yes    Changes in medications: no    Using Incentive Spirometer?: Yes    Did you Attend Pre-hab?  no    Ortho Vitals: N/A    How was your care while you were in the hospital? great     Answered questions or concerns about  Walker length    Reminded patient that they will be getting a survey in the mail and we appreciate their feedback and taking the time to answer all the questions and return. No other questions/concerns at this time. Follow up appointment confirmed. Encouraged patient to call Orthopedic Nurse Harjeet Sabillon (#[phone]) or Orthopedic office if has any questions/concerns.        Follow up appointments:    Future Appointments   Date Time Provider Gissel Aleman   12/13/2021 11:00 AM Mary Louise MD [facility]

## 2021-09-22 NOTE — TELEPHONE ENCOUNTER
Attempted to contact patient. Left voicemail for patient stating purpose and call back number.    Bigg Aguilar  Orthopedic Nurse Navigator  Phone number: [phone]  Future Appointments   Date Time Provider Gissel Aleman   12/13/2021 11:00 AM Cory Chi MD [facility]

## 2021-10-18 NOTE — PROGRESS NOTES
The patient will attend class on____not ordered already had___________  The patent will get ordered PATs on___ 10/23 will  soap________________________________  The patient will see PCP within 30 days of scheduled surgery__to male appt____________  COVID__11/9 pt will bring results_______

## 2021-10-18 NOTE — PROGRESS NOTES
Name_______________________________________Printed:____________________  Date and time of surgery__11.15 0700______________________Arrival Time:_0600_______________   1. The instructions given regarding when and if a patient needs to stop oral intake prior to surgery varies. Follow the specific instructions you were given                  _x__Nothing to eat or to drink after Midnight the night before.                   ____Carbo loading or ERAS instructions will be given to select patients-if you have been given those instructions -please do the following                           The evening before your surgery after dinner before midnight drink 40 ounces of gatorade. If you are diabetic use sugar free. The morning of surgery drink 40 ounces of water. This needs to be finished 3 hours prior to your surgery start time. 2. Take the following pills with a small sip of water on the morning of surgery____ wellbutrin_______________________________________________                  Do not take blood pressure medications ending in pril or sartan the clark prior to surgery or the morning of surgery_   3. Aspirin, Ibuprofen, Advil, Naproxen, Vitamin E and other Anti-inflammatory products and supplements should be stopped for 5 -7days before surgery or as directed by your physician. 4. Check with your Doctor regarding stopping Plavix, Coumadin,Eliquis, Lovenox,Effient,Pradaxa,Xarelto, Fragmin or other blood thinners and follow their instructions. 5. Do not smoke, and do not drink any alcoholic beverages 24 hours prior to surgery. This includes NA Beer. Refrain from the usage of any recreational drugs. 6. You may brush your teeth and gargle the morning of surgery. DO NOT SWALLOW WATER   7. You MUST make arrangements for a responsible adult to stay on site while you are here and take you home after your surgery. You will not be allowed to leave alone or drive yourself home.   It is strongly suggested someone stay with you information:  [email or web address]              20.During flu season no children under the age of 15 are permitted in the hospital for the safety of all patients. 21. If you take a long acting insulin in the evening only  take half of your usual  dose the night  before your procedure              22. If you use a c-pap please bring DOS if staying overnight,             23.For your convenience [facility] has a pharmacy on site to fill your prescriptions. 24. If you use oxygen and have a portable tank please bring it  with you the DOS             25. Bring a complete list of all your medications with name and dose include any supplements. 26. Other__________________________________________   *Please call pre admission testing if you any further questions   Alexander Obando   [address] [place] [address]. [place]  [phone]   [facility]    ___ Covid test to be done 3-5 days prior to scheduled surgery -patient aware they are REQUIRED to bring a copy of the nxegative result DOS-if they receive a positive result to notify their surgeon         If known - indicate where patient is getting covid test done _____11/9 patient knows to bring results______________________________________________________    ___ Rapid - DOS    ___ Other___________________________________      [facility] POLICY(subject to change)    There is a one visitor policy at [facility] for all surgeries and endoscopies. Whether the visitor can stay or will be asked to wait in the car will depend on the current policy and if social distancing can be maintained. The policy is subject to change at any time. Please make sure the visitor has a cell phone that is on,charged and able to accept calls, as this may be the way that the staff communicates with them. Pain management is NO VISITOR policyThe patients ride is expected to remain in the car with a

## 2021-10-23 ENCOUNTER — HOSPITAL ENCOUNTER (OUTPATIENT)
Age: 61
Discharge: HOME | End: 2021-10-23
Payer: COMMERCIAL

## 2021-10-23 DIAGNOSIS — Z01.818 PREOP TESTING: ICD-10-CM

## 2021-10-23 LAB
ABO/RH: NORMAL
ANION GAP SERPL CALCULATED.3IONS-SCNC: 14 MMOL/L (ref 3–16)
ANTIBODY SCREEN: NORMAL
APTT: 37.4 SEC (ref 26.2–38.6)
BASOPHILS ABSOLUTE: 0 K/UL (ref 0–0.2)
BASOPHILS RELATIVE PERCENT: 0.7 %
BILIRUBIN URINE: NEGATIVE
BLOOD, URINE: NEGATIVE
BUN BLDV-MCNC: 14 MG/DL (ref 7–20)
CALCIUM SERPL-MCNC: 9.7 MG/DL (ref 8.3–10.6)
CHLORIDE BLD-SCNC: 101 MMOL/L (ref 99–110)
CLARITY: CLEAR
CO2: 24 MMOL/L (ref 21–32)
COLOR: YELLOW
CREAT SERPL-MCNC: 0.8 MG/DL (ref 0.6–1.2)
EOSINOPHILS ABSOLUTE: 0.1 K/UL (ref 0–0.6)
EOSINOPHILS RELATIVE PERCENT: 3 %
GFR AFRICAN AMERICAN: >60
GFR NON-AFRICAN AMERICAN: >60
GLUCOSE BLD-MCNC: 80 MG/DL (ref 70–99)
GLUCOSE URINE: NEGATIVE MG/DL
HCT VFR BLD CALC: 37.9 % (ref 36–48)
HEMOGLOBIN: 12.7 G/DL (ref 12–16)
INR BLD: 0.97 (ref 0.88–1.12)
KETONES, URINE: NEGATIVE MG/DL
LEUKOCYTE ESTERASE, URINE: NEGATIVE
LYMPHOCYTES ABSOLUTE: 1.2 K/UL (ref 1–5.1)
LYMPHOCYTES RELATIVE PERCENT: 25.2 %
MCH RBC QN AUTO: 30.8 PG (ref 26–34)
MCHC RBC AUTO-ENTMCNC: 33.5 G/DL (ref 31–36)
MCV RBC AUTO: 92.2 FL (ref 80–100)
MICROSCOPIC EXAMINATION: NORMAL
MONOCYTES ABSOLUTE: 0.4 K/UL (ref 0–1.3)
MONOCYTES RELATIVE PERCENT: 8.5 %
NEUTROPHILS ABSOLUTE: 2.9 K/UL (ref 1.7–7.7)
NEUTROPHILS RELATIVE PERCENT: 62.6 %
NITRITE, URINE: NEGATIVE
PDW BLD-RTO: 12.8 % (ref 12.4–15.4)
PH UA: 6.5 (ref 5–8)
PLATELET # BLD: 271 K/UL (ref 135–450)
PMV BLD AUTO: 8.6 FL (ref 5–10.5)
POTASSIUM SERPL-SCNC: 4.4 MMOL/L (ref 3.5–5.1)
PROTEIN UA: NEGATIVE MG/DL
PROTHROMBIN TIME: 10.9 SEC (ref 9.9–12.7)
RBC # BLD: 4.11 M/UL (ref 4–5.2)
SODIUM BLD-SCNC: 139 MMOL/L (ref 136–145)
SPECIFIC GRAVITY UA: 1.02 (ref 1–1.03)
URINE TYPE: NORMAL
UROBILINOGEN, URINE: 0.2 E.U./DL
WBC # BLD: 4.7 K/UL (ref 4–11)

## 2021-10-23 PROCEDURE — 87086 URINE CULTURE/COLONY COUNT: CPT

## 2021-10-23 PROCEDURE — 86901 BLOOD TYPING SEROLOGIC RH(D): CPT

## 2021-10-23 PROCEDURE — 87081 CULTURE SCREEN ONLY: CPT

## 2021-10-23 PROCEDURE — 81003 URINALYSIS AUTO W/O SCOPE: CPT

## 2021-10-23 PROCEDURE — 36415 COLL VENOUS BLD VENIPUNCTURE: CPT

## 2021-10-23 PROCEDURE — 80048 BASIC METABOLIC PNL TOTAL CA: CPT

## 2021-10-23 PROCEDURE — 86900 BLOOD TYPING SEROLOGIC ABO: CPT

## 2021-10-23 PROCEDURE — 85025 COMPLETE CBC W/AUTO DIFF WBC: CPT

## 2021-10-23 PROCEDURE — 85730 THROMBOPLASTIN TIME PARTIAL: CPT

## 2021-10-23 PROCEDURE — 86850 RBC ANTIBODY SCREEN: CPT

## 2021-10-23 PROCEDURE — 85610 PROTHROMBIN TIME: CPT

## 2021-10-24 LAB — URINE CULTURE, ROUTINE: NORMAL

## 2021-10-25 LAB — MRSA CULTURE ONLY: NORMAL

## 2021-11-03 ENCOUNTER — OFFICE VISIT (OUTPATIENT)
Dept: FAMILY MEDICINE CLINIC | Age: 61
End: 2021-11-03
Payer: COMMERCIAL

## 2021-11-03 VITALS
DIASTOLIC BLOOD PRESSURE: 76 MMHG | TEMPERATURE: 98 F | OXYGEN SATURATION: 99 % | HEART RATE: 71 BPM | RESPIRATION RATE: 16 BRPM | WEIGHT: 161 LBS | BODY MASS INDEX: 23.05 KG/M2 | SYSTOLIC BLOOD PRESSURE: 112 MMHG | HEIGHT: 70 IN

## 2021-11-03 DIAGNOSIS — Z23 NEED FOR VACCINATION: ICD-10-CM

## 2021-11-03 DIAGNOSIS — E55.9 VITAMIN D DEFICIENCY: ICD-10-CM

## 2021-11-03 DIAGNOSIS — G89.29 CHRONIC PAIN OF RIGHT KNEE: Primary | ICD-10-CM

## 2021-11-03 DIAGNOSIS — Z01.818 PREOP EXAMINATION: ICD-10-CM

## 2021-11-03 DIAGNOSIS — I21.4 NSTEMI (NON-ST ELEVATED MYOCARDIAL INFARCTION) (HCC): ICD-10-CM

## 2021-11-03 DIAGNOSIS — K21.9 GASTROESOPHAGEAL REFLUX DISEASE, UNSPECIFIED WHETHER ESOPHAGITIS PRESENT: ICD-10-CM

## 2021-11-03 DIAGNOSIS — I42.9 SECONDARY CARDIOMYOPATHY (HCC): ICD-10-CM

## 2021-11-03 DIAGNOSIS — M25.561 CHRONIC PAIN OF RIGHT KNEE: Primary | ICD-10-CM

## 2021-11-03 DIAGNOSIS — M81.0 POSTMENOPAUSAL OSTEOPOROSIS: ICD-10-CM

## 2021-11-03 PROCEDURE — 90750 HZV VACC RECOMBINANT IM: CPT | Performed by: NURSE PRACTITIONER

## 2021-11-03 PROCEDURE — 90674 CCIIV4 VAC NO PRSV 0.5 ML IM: CPT | Performed by: NURSE PRACTITIONER

## 2021-11-03 PROCEDURE — 90471 IMMUNIZATION ADMIN: CPT | Performed by: NURSE PRACTITIONER

## 2021-11-03 PROCEDURE — 99214 OFFICE O/P EST MOD 30 MIN: CPT | Performed by: NURSE PRACTITIONER

## 2021-11-03 PROCEDURE — 90472 IMMUNIZATION ADMIN EACH ADD: CPT | Performed by: NURSE PRACTITIONER

## 2021-11-03 RX ORDER — OXYCODONE HYDROCHLORIDE 5 MG/1
15 TABLET ORAL
Status: ON HOLD | COMMUNITY
Start: 2021-10-26 | End: 2021-11-15 | Stop reason: HOSPADM

## 2021-11-03 NOTE — PROGRESS NOTES
[address] Preoperative Evaluation       Haleigh Dietrich, CNP     [address], [address]     (phone) [phone]       (fax) [phone]    Dear Dr. Erika Singh,     Thank you for referring Joan Sanchez to me for Preoperative Evaluation. Below are the relevant portions of my assessment and plan of care. Joan Sanchez    62 y.o.   1960    [address] [address]    Vitals:    11/03/21 1048   BP: 112/76   Pulse: 71   Resp: 16   Temp: 98 °F (36.7 °C)   SpO2: 99%   Weight: 161 lb (73 kg)   Height: 5' 10\" (1.778 m)      Wt Readings from Last 2 Encounters:   11/03/21 161 lb (73 kg)   09/20/21 162 lb (73.5 kg)     BP Readings from Last 3 Encounters:   11/03/21 112/76   09/20/21 (!) 118/53   09/20/21 134/78        Allergies   Allergen Reactions    Lisinopril      Cough     Current Outpatient Medications   Medication Sig Dispense Refill    oxyCODONE (ROXICODONE) 5 MG immediate release tablet 15 mg.       acetaminophen (APAP EXTRA STRENGTH) 500 MG tablet Take 2 tablets by mouth every 6 hours as needed for Pain 60 tablet 3    pregabalin (LYRICA) 100 MG capsule TAKE 1 CAPSULE BY MOUTH EVERY DAY (Patient taking differently: nightly. ) 90 capsule 1    MAGNESIUM-POTASSIUM PO Take by mouth daily      Calcium Carbonate-Vit D-Min (CALCIUM 1200 PO) Take by mouth daily      Multiple Vitamins-Minerals (THERAPEUTIC MULTIVITAMIN-MINERALS) tablet Take 1 tablet by mouth daily      MYRBETRIQ 50 MG TB24 TAKE 1 TABLET BY MOUTH EVERY DAY      buPROPion (WELLBUTRIN XL) 300 MG extended release tablet Take 1 tablet by mouth every morning 90 tablet 1    valsartan (DIOVAN) 80 MG tablet Take 1 tablet by mouth daily 30 tablet 3    traZODone (DESYREL) 100 MG tablet TAKE 2 TABLETS BY MOUTH EVERY NIGHT (Patient taking differently:  Only taking 1 tablet day) 180 tablet 2    Estradiol (YUVAFEM) 10 MCG TABS vaginal tablet Place 1 tablet vaginally Twice a Week 36 tablet 3    esomeprazole (NEXIUM) 40 MG delayed release capsule Take 1 capsule by mouth every morning (before breakfast) 90 capsule 1    Omega-3 Fatty Acids (FISH OIL PO) Take by mouth      Cholecalciferol (VITAMIN D PO) Take by mouth       No current facility-administered medications for this visit. She presents to the office today for a preoperative consultation at the request of surgeon, Jesusita Gaviria who plans on performing right partial knee replacement on November 15 at [facility]. The current problem began several years ago and has worsened. Conservative therapy, including PT, cortisone injection, gel injections, has failed. Planned anesthesia is General.  The patient has no known anesthesia issues. Patient has no bleeding risk. Patient does not have objection to receiving blood products if needed.     Past Medical History:   Diagnosis Date    Alcoholism ([address]     Recovering    Cardiomyopathy [facility] [address]     Chronic left-sided low back pain without sciatica 5/26/2020    Degenerative joint disease     Depression     Fibrocystic disease of breast 6/15/2010    GERD (gastroesophageal reflux disease)     Headache(784.0)     Hemorrhoid 6/15/2010    Hyperlipidemia 6/25/2019    Mitral valve regurgitation     Osteoporosis     Screening mammogram, encounter for 02/04/2018    Dr Pratibha Giraldo MD    Urinary incontinence     Vitamin D deficiency 3/16/2012     Past Surgical History:   Procedure Laterality Date    APPENDECTOMY  01/13/2021    BLADDER SURGERY      with hysterectomy-vaginal approach    BREAST SURGERY  2006    benign cyst on left    CARDIAC CATHETERIZATION  01/29/2021    COLONOSCOPY  11/30/2012    Dr Marguerite Chanel      facial 12.11    FACIAL COSMETIC SURGERY      HYSTERECTOMY  1996    KNEE ARTHROPLASTY Left 9/20/2021    OPEN PATELLOFEMORAL  ARTHROPLASTY WITH ROBOTIC NAVIGATION- LEFT KNEE  - Shirin Yiplla & NEPHENRRIQUE ([id number]) performed by [facility] at [facility]  SALPINGO-OOPHORECTOMY      still with left ovary     Family History is not significant for reactions to anesthesia    Family History   Problem Relation Age of Onset    High Blood Pressure Mother     High Cholesterol Mother     High Blood Pressure Father     High Cholesterol Father     Arthritis Father     Arthritis Paternal Grandmother     Rheum Arthritis Neg Hx     Osteoarthritis Neg Hx     Asthma Neg Hx     Breast Cancer Neg Hx     Cancer Neg Hx     Diabetes Neg Hx     Heart Failure Neg Hx     Hypertension Neg Hx     Migraines Neg Hx     Ovarian Cancer Neg Hx     Rashes/Skin Problems Neg Hx     Seizures Neg Hx     Stroke Neg Hx     Thyroid Disease Neg Hx      Social History     Socioeconomic History    Marital status:      Spouse name: Not on file    Number of children: Not on file    Years of education: Not on file    Highest education level: Not on file   Occupational History    Not on file   Tobacco Use    Smoking status: Never Smoker    Smokeless tobacco: Never Used   Vaping Use    Vaping Use: Never used   Substance and Sexual Activity    Alcohol use: No     Alcohol/week: 0.0 standard drinks     Comment: none since 2003    Drug use: No    Sexual activity: Yes     Partners: Male   Other Topics Concern    Not on file   Social History Narrative    Not on file     Social Determinants of Health     Financial Resource Strain: Low Risk     Difficulty of Paying Living Expenses: Not hard at all   Food Insecurity: No Food Insecurity    Worried About Running Out of Food in the Last Year: Never true    Frankie of Food in the Last Year: Never true   Transportation Needs:     Lack of Transportation (Medical):      Lack of Transportation (Non-Medical):    Physical Activity:     Days of Exercise per Week:     Minutes of Exercise per Session:    Stress:     Feeling of Stress :    Social Connections:     Frequency of Communication with Friends and Family:     Frequency of and aorta. There is no organomegaly, bruit or palpable mass. Neurological: She is alert and oriented to person, place, and time. No cranial nerve deficit. Coordination normal.   Skin: Skin is warm and dry. There is no rash or erythema. No suspicious lesions noted. Psychiatric: She has a normal mood and affect. Speech and behavior are normal. Judgment, cognition and memory are normal.        Lab Review   Lab Results   Component Value Date     10/23/2021    K 4.4 10/23/2021     10/23/2021    CO2 24 10/23/2021    BUN 14 10/23/2021    CREATININE 0.8 10/23/2021    GLUCOSE 80 10/23/2021    CALCIUM 9.7 10/23/2021     Lab Results   Component Value Date    WBC 4.7 10/23/2021    HGB 12.7 10/23/2021    HCT 37.9 10/23/2021    MCV 92.2 10/23/2021     10/23/2021          Assessment:     62 y.o. patient with planned surgery as above. Known risk factors for perioperative complications:   History of non-ST elevation myocardial infarction (NSTEMI)  Secondary cardiomyopathy (HCC)  Gastroesophageal reflux disease, unspecified whether esophagitis present  Postmenopausal osteoporosis  Vitamin D deficiency              Plan:    1. Preoperative workup as follows: none, per surgeon. 2. Change in medication regimen before surgery: none, continue current medications. 3. Cardiac clearance needs to be obtained from Dr. Felisha Cespedes  4. Patient is cleared for upcoming surgery, assuming cardiac clearance is obtained. If you have questions, please do not hesitate to call me.     Sincerely,        Thomasina Phoenix, CNP

## 2021-11-03 NOTE — PATIENT INSTRUCTIONS
Patient Education        Partial Knee Replacement: Before Your Surgery  What is partial knee replacement? Partial knee replacement is used when one side of the knee is damaged. It replaces only the damaged part of the knee. Your doctor will make a cut in your knee. This cut is called an incision. It will leave a scar that usually fades with time. You may be able to go home the same day. If you have partials on both knees at once, you may need to stay in the hospital for a day or more. Most people go back to normal activities or work in 4 to 12 weeks. This depends on your health. It also depends on how well your knee does in your rehab program. This may take longer if you have both knees done at the same time. How do you prepare for surgery? Surgery can be stressful. This information will help you understand what you can expect. And it will help you safely prepare for surgery. Preparing for surgery    · You may need to shower or bathe with a special soap the night before and the morning of your surgery. The soap contains chlorhexidine. It reduces the amount of bacteria on your skin that could cause an infection after surgery.     · Be sure you have someone to take you home. Anesthesia and pain medicine will make it unsafe for you to drive or get home on your own.     · Understand exactly what surgery is planned, along with the risks, benefits, and other options.     · Tell your doctor ALL the medicines, vitamins, supplements, and herbal remedies you take. Some may increase the risk of problems during your surgery. Your doctor will tell you if you should stop taking any of them before the surgery and how soon to do it.     · If you take aspirin or some other blood thinner, ask your doctor if you should stop taking it before your surgery. Make sure that you understand exactly what your doctor wants you to do.  These medicines increase the risk of bleeding.     · Make sure your doctor and the hospital have a copy of your advance directive. If you don't have one, you may want to prepare one. It lets others know your health care wishes. It's a good thing to have before any type of surgery or procedure. What happens on the day of surgery? · Follow the instructions exactly about when to stop eating and drinking. If you don't, your surgery may be canceled. If your doctor told you to take your medicines on the day of surgery, take them with only a sip of water.     · Take a bath or shower before you come in for your surgery. Do not apply lotions, perfumes, deodorants, or nail polish.     · Do not shave the surgical site yourself.     · Take off all jewelry and piercings. And take out contact lenses, if you wear them. At the hospital or surgery center   · Bring a picture ID.     · The area for surgery is often marked to make sure there are no errors.     · You will be kept comfortable and safe by your anesthesia provider. The anesthesia may make you sleep. Or it may just numb the area being worked on.     · You may also get a shot of medicine into your spine. This will make your legs numb. You will not feel pain during the surgery.     · You also will get antibiotics through an IV tube before surgery. This lowers the risk of an infection of the incision.     · The surgery will take about 2 to 3 hours. When should you call your doctor? · You have questions or concerns.     · You don't understand how to prepare for your surgery.     · You become ill before the surgery (such as fever, flu, or a cold).     · You need to reschedule or have changed your mind about having the surgery. Where can you learn more? Go to [email or web address] and sign in to your [employer] account. Enter [phone] in the [facility] box to learn more about \"Partial Knee Replacement: Before Your Surgery. \"     If you do not have an account, please click on the \"Sign Up Now\" link.   Current as of: July 1, 2021               Content Version: 13.0  © 1269-8725 [employer], [employer]. Care instructions adapted under license by [facility] ([facility]). If you have questions about a medical condition or this instruction, always ask your healthcare professional. [address] any warranty or liability for your use of this information.

## 2021-11-15 ENCOUNTER — HOSPITAL ENCOUNTER (OUTPATIENT)
Age: 61
Setting detail: SURGERY ADMIT
Discharge: HOME | End: 2021-11-15
Attending: ORTHOPAEDIC SURGERY | Admitting: ORTHOPAEDIC SURGERY
Payer: COMMERCIAL

## 2021-11-15 ENCOUNTER — ANESTHESIA EVENT (OUTPATIENT)
Dept: OPERATING ROOM | Age: 61
End: 2021-11-15
Payer: COMMERCIAL

## 2021-11-15 ENCOUNTER — APPOINTMENT (OUTPATIENT)
Dept: GENERAL RADIOLOGY | Age: 61
End: 2021-11-15
Attending: ORTHOPAEDIC SURGERY
Payer: COMMERCIAL

## 2021-11-15 ENCOUNTER — ANESTHESIA (OUTPATIENT)
Dept: OPERATING ROOM | Age: 61
End: 2021-11-15
Payer: COMMERCIAL

## 2021-11-15 VITALS
SYSTOLIC BLOOD PRESSURE: 120 MMHG | TEMPERATURE: 99.7 F | OXYGEN SATURATION: 100 % | DIASTOLIC BLOOD PRESSURE: 60 MMHG | RESPIRATION RATE: 8 BRPM

## 2021-11-15 VITALS
DIASTOLIC BLOOD PRESSURE: 133 MMHG | TEMPERATURE: 97 F | HEART RATE: 71 BPM | OXYGEN SATURATION: 100 % | RESPIRATION RATE: 16 BRPM | WEIGHT: 156.5 LBS | SYSTOLIC BLOOD PRESSURE: 162 MMHG | HEIGHT: 70 IN | BODY MASS INDEX: 22.41 KG/M2

## 2021-11-15 DIAGNOSIS — M17.11 ARTHRITIS OF KNEE, RIGHT: ICD-10-CM

## 2021-11-15 DIAGNOSIS — Z01.818 PREOP TESTING: Primary | ICD-10-CM

## 2021-11-15 LAB
ABO/RH: NORMAL
ANTIBODY SCREEN: NORMAL

## 2021-11-15 PROCEDURE — 6360000002 HC RX W HCPCS: Performed by: ORTHOPAEDIC SURGERY

## 2021-11-15 PROCEDURE — 86900 BLOOD TYPING SEROLOGIC ABO: CPT

## 2021-11-15 PROCEDURE — 7100000001 HC PACU RECOVERY - ADDTL 15 MIN: Performed by: ORTHOPAEDIC SURGERY

## 2021-11-15 PROCEDURE — C1713 ANCHOR/SCREW BN/BN,TIS/BN: HCPCS | Performed by: ORTHOPAEDIC SURGERY

## 2021-11-15 PROCEDURE — 7100000000 HC PACU RECOVERY - FIRST 15 MIN: Performed by: ORTHOPAEDIC SURGERY

## 2021-11-15 PROCEDURE — 3700000001 HC ADD 15 MINUTES (ANESTHESIA): Performed by: ORTHOPAEDIC SURGERY

## 2021-11-15 PROCEDURE — 97535 SELF CARE MNGMENT TRAINING: CPT

## 2021-11-15 PROCEDURE — 2500000003 HC RX 250 WO HCPCS: Performed by: ORTHOPAEDIC SURGERY

## 2021-11-15 PROCEDURE — 3700000000 HC ANESTHESIA ATTENDED CARE: Performed by: ORTHOPAEDIC SURGERY

## 2021-11-15 PROCEDURE — 6360000002 HC RX W HCPCS: Performed by: NURSE ANESTHETIST, CERTIFIED REGISTERED

## 2021-11-15 PROCEDURE — 3600000015 HC SURGERY LEVEL 5 ADDTL 15MIN: Performed by: ORTHOPAEDIC SURGERY

## 2021-11-15 PROCEDURE — 2580000003 HC RX 258: Performed by: ORTHOPAEDIC SURGERY

## 2021-11-15 PROCEDURE — 7100000011 HC PHASE II RECOVERY - ADDTL 15 MIN: Performed by: ORTHOPAEDIC SURGERY

## 2021-11-15 PROCEDURE — 36415 COLL VENOUS BLD VENIPUNCTURE: CPT

## 2021-11-15 PROCEDURE — 3600000005 HC SURGERY LEVEL 5 BASE: Performed by: ORTHOPAEDIC SURGERY

## 2021-11-15 PROCEDURE — 2500000003 HC RX 250 WO HCPCS: Performed by: NURSE ANESTHETIST, CERTIFIED REGISTERED

## 2021-11-15 PROCEDURE — 6370000000 HC RX 637 (ALT 250 FOR IP): Performed by: ORTHOPAEDIC SURGERY

## 2021-11-15 PROCEDURE — 97161 PT EVAL LOW COMPLEX 20 MIN: CPT

## 2021-11-15 PROCEDURE — 97165 OT EVAL LOW COMPLEX 30 MIN: CPT

## 2021-11-15 PROCEDURE — C1776 JOINT DEVICE (IMPLANTABLE): HCPCS | Performed by: ORTHOPAEDIC SURGERY

## 2021-11-15 PROCEDURE — 2580000003 HC RX 258: Performed by: ANESTHESIOLOGY

## 2021-11-15 PROCEDURE — 2580000003 HC RX 258: Performed by: NURSE ANESTHETIST, CERTIFIED REGISTERED

## 2021-11-15 PROCEDURE — 6370000000 HC RX 637 (ALT 250 FOR IP): Performed by: ANESTHESIOLOGY

## 2021-11-15 PROCEDURE — 64447 NJX AA&/STRD FEMORAL NRV IMG: CPT | Performed by: ANESTHESIOLOGY

## 2021-11-15 PROCEDURE — 2720000010 HC SURG SUPPLY STERILE: Performed by: ORTHOPAEDIC SURGERY

## 2021-11-15 PROCEDURE — 6360000002 HC RX W HCPCS: Performed by: ANESTHESIOLOGY

## 2021-11-15 PROCEDURE — 2500000003 HC RX 250 WO HCPCS: Performed by: ANESTHESIOLOGY

## 2021-11-15 PROCEDURE — 7100000010 HC PHASE II RECOVERY - FIRST 15 MIN: Performed by: ORTHOPAEDIC SURGERY

## 2021-11-15 PROCEDURE — 2709999900 HC NON-CHARGEABLE SUPPLY: Performed by: ORTHOPAEDIC SURGERY

## 2021-11-15 PROCEDURE — 86850 RBC ANTIBODY SCREEN: CPT

## 2021-11-15 PROCEDURE — 73560 X-RAY EXAM OF KNEE 1 OR 2: CPT

## 2021-11-15 PROCEDURE — 86901 BLOOD TYPING SEROLOGIC RH(D): CPT

## 2021-11-15 DEVICE — GENESIS II OVAL RESURFACING                                    PATELLAR 29MM
Type: IMPLANTABLE DEVICE | Site: KNEE | Status: FUNCTIONAL
Brand: GENESIS II

## 2021-11-15 DEVICE — JOURNEY PATELLOFEMORAL IMPLANT                                    X-SMALL RIGHT
Type: IMPLANTABLE DEVICE | Site: KNEE | Status: FUNCTIONAL
Brand: JOURNEY

## 2021-11-15 DEVICE — CEMENT BNE 40GM FULL DOSE PMMA W/ GENT HI VISC RADPQ LNG: Type: IMPLANTABLE DEVICE | Site: KNEE | Status: FUNCTIONAL

## 2021-11-15 RX ORDER — OXYCODONE HYDROCHLORIDE 5 MG/1
10 TABLET ORAL EVERY 4 HOURS PRN
Status: DISCONTINUED | OUTPATIENT
Start: 2021-11-15 | End: 2021-11-15 | Stop reason: HOSPADM

## 2021-11-15 RX ORDER — SODIUM CHLORIDE 0.9 % (FLUSH) 0.9 %
10 SYRINGE (ML) INJECTION EVERY 12 HOURS SCHEDULED
Status: DISCONTINUED | OUTPATIENT
Start: 2021-11-15 | End: 2021-11-15 | Stop reason: HOSPADM

## 2021-11-15 RX ORDER — HYDROMORPHONE HCL 110MG/55ML
0.25 PATIENT CONTROLLED ANALGESIA SYRINGE INTRAVENOUS EVERY 5 MIN PRN
Status: DISCONTINUED | OUTPATIENT
Start: 2021-11-15 | End: 2021-11-15 | Stop reason: HOSPADM

## 2021-11-15 RX ORDER — HYDROCODONE BITARTRATE AND ACETAMINOPHEN 5; 325 MG/1; MG/1
1 TABLET ORAL
Status: COMPLETED | OUTPATIENT
Start: 2021-11-15 | End: 2021-11-15

## 2021-11-15 RX ORDER — PROPOFOL 10 MG/ML
INJECTION, EMULSION INTRAVENOUS PRN
Status: DISCONTINUED | OUTPATIENT
Start: 2021-11-15 | End: 2021-11-15 | Stop reason: SDUPTHER

## 2021-11-15 RX ORDER — OXYCODONE HYDROCHLORIDE 5 MG/1
5 TABLET ORAL EVERY 4 HOURS PRN
Qty: 42 TABLET | Refills: 0 | Status: SHIPPED | OUTPATIENT
Start: 2021-11-15 | End: 2021-11-22

## 2021-11-15 RX ORDER — MELOXICAM 15 MG/1
15 TABLET ORAL DAILY
Qty: 30 TABLET | Refills: 3 | Status: SHIPPED | OUTPATIENT
Start: 2021-11-15 | End: 2021-11-15 | Stop reason: HOSPADM

## 2021-11-15 RX ORDER — SODIUM CHLORIDE, SODIUM LACTATE, POTASSIUM CHLORIDE, CALCIUM CHLORIDE 600; 310; 30; 20 MG/100ML; MG/100ML; MG/100ML; MG/100ML
INJECTION, SOLUTION INTRAVENOUS CONTINUOUS
Status: DISCONTINUED | OUTPATIENT
Start: 2021-11-15 | End: 2021-11-15 | Stop reason: HOSPADM

## 2021-11-15 RX ORDER — SODIUM CHLORIDE 0.9 % (FLUSH) 0.9 %
10 SYRINGE (ML) INJECTION PRN
Status: DISCONTINUED | OUTPATIENT
Start: 2021-11-15 | End: 2021-11-15 | Stop reason: HOSPADM

## 2021-11-15 RX ORDER — LIDOCAINE HYDROCHLORIDE 10 MG/ML
1 INJECTION, SOLUTION EPIDURAL; INFILTRATION; INTRACAUDAL; PERINEURAL
Status: DISCONTINUED | OUTPATIENT
Start: 2021-11-15 | End: 2021-11-15 | Stop reason: HOSPADM

## 2021-11-15 RX ORDER — ROCURONIUM BROMIDE 10 MG/ML
INJECTION, SOLUTION INTRAVENOUS PRN
Status: DISCONTINUED | OUTPATIENT
Start: 2021-11-15 | End: 2021-11-15 | Stop reason: SDUPTHER

## 2021-11-15 RX ORDER — SUCCINYLCHOLINE CHLORIDE 20 MG/ML
INJECTION INTRAMUSCULAR; INTRAVENOUS PRN
Status: DISCONTINUED | OUTPATIENT
Start: 2021-11-15 | End: 2021-11-15 | Stop reason: SDUPTHER

## 2021-11-15 RX ORDER — HYDROMORPHONE HYDROCHLORIDE 2 MG/1
2 TABLET ORAL EVERY 4 HOURS PRN
Qty: 8 TABLET | Refills: 0 | Status: SHIPPED | OUTPATIENT
Start: 2021-11-15 | End: 2021-11-18

## 2021-11-15 RX ORDER — LIDOCAINE HYDROCHLORIDE 10 MG/ML
0.5 INJECTION, SOLUTION EPIDURAL; INFILTRATION; INTRACAUDAL; PERINEURAL ONCE
Status: DISCONTINUED | OUTPATIENT
Start: 2021-11-15 | End: 2021-11-15 | Stop reason: HOSPADM

## 2021-11-15 RX ORDER — MIDAZOLAM HYDROCHLORIDE 2 MG/2ML
1 INJECTION, SOLUTION INTRAMUSCULAR; INTRAVENOUS ONCE
Status: COMPLETED | OUTPATIENT
Start: 2021-11-15 | End: 2021-11-15

## 2021-11-15 RX ORDER — ACETAMINOPHEN 500 MG
1000 TABLET ORAL EVERY 6 HOURS PRN
Qty: 60 TABLET | Refills: 3 | Status: SHIPPED | OUTPATIENT
Start: 2021-11-15

## 2021-11-15 RX ORDER — ONDANSETRON 2 MG/ML
4 INJECTION INTRAMUSCULAR; INTRAVENOUS
Status: DISCONTINUED | OUTPATIENT
Start: 2021-11-15 | End: 2021-11-15 | Stop reason: HOSPADM

## 2021-11-15 RX ORDER — SODIUM CHLORIDE 9 MG/ML
INJECTION, SOLUTION INTRAVENOUS CONTINUOUS
Status: DISCONTINUED | OUTPATIENT
Start: 2021-11-15 | End: 2021-11-15 | Stop reason: HOSPADM

## 2021-11-15 RX ORDER — KETOROLAC TROMETHAMINE 30 MG/ML
INJECTION, SOLUTION INTRAMUSCULAR; INTRAVENOUS PRN
Status: DISCONTINUED | OUTPATIENT
Start: 2021-11-15 | End: 2021-11-15 | Stop reason: SDUPTHER

## 2021-11-15 RX ORDER — BUPIVACAINE HYDROCHLORIDE 2.5 MG/ML
INJECTION, SOLUTION EPIDURAL; INFILTRATION; INTRACAUDAL
Status: DISCONTINUED | OUTPATIENT
Start: 2021-11-15 | End: 2021-11-15 | Stop reason: SDUPTHER

## 2021-11-15 RX ORDER — PROMETHAZINE HYDROCHLORIDE 25 MG/1
12.5 TABLET ORAL EVERY 6 HOURS PRN
Status: DISCONTINUED | OUTPATIENT
Start: 2021-11-15 | End: 2021-11-15 | Stop reason: HOSPADM

## 2021-11-15 RX ORDER — CEPHALEXIN 500 MG/1
500 CAPSULE ORAL 2 TIMES DAILY
Qty: 4 CAPSULE | Refills: 0 | Status: SHIPPED | OUTPATIENT
Start: 2021-11-15 | End: 2021-11-26

## 2021-11-15 RX ORDER — KETAMINE HYDROCHLORIDE 10 MG/ML
INJECTION, SOLUTION INTRAMUSCULAR; INTRAVENOUS PRN
Status: DISCONTINUED | OUTPATIENT
Start: 2021-11-15 | End: 2021-11-15 | Stop reason: SDUPTHER

## 2021-11-15 RX ORDER — SODIUM CHLORIDE, SODIUM LACTATE, POTASSIUM CHLORIDE, CALCIUM CHLORIDE 600; 310; 30; 20 MG/100ML; MG/100ML; MG/100ML; MG/100ML
INJECTION, SOLUTION INTRAVENOUS CONTINUOUS PRN
Status: DISCONTINUED | OUTPATIENT
Start: 2021-11-15 | End: 2021-11-15 | Stop reason: SDUPTHER

## 2021-11-15 RX ORDER — ACETAMINOPHEN 325 MG/1
650 TABLET ORAL EVERY 6 HOURS
Status: DISCONTINUED | OUTPATIENT
Start: 2021-11-15 | End: 2021-11-15 | Stop reason: HOSPADM

## 2021-11-15 RX ORDER — SODIUM CHLORIDE 9 MG/ML
25 INJECTION, SOLUTION INTRAVENOUS PRN
Status: DISCONTINUED | OUTPATIENT
Start: 2021-11-15 | End: 2021-11-15 | Stop reason: HOSPADM

## 2021-11-15 RX ORDER — ONDANSETRON 2 MG/ML
INJECTION INTRAMUSCULAR; INTRAVENOUS PRN
Status: DISCONTINUED | OUTPATIENT
Start: 2021-11-15 | End: 2021-11-15 | Stop reason: SDUPTHER

## 2021-11-15 RX ORDER — DEXAMETHASONE SODIUM PHOSPHATE 4 MG/ML
INJECTION, SOLUTION INTRA-ARTICULAR; INTRALESIONAL; INTRAMUSCULAR; INTRAVENOUS; SOFT TISSUE PRN
Status: DISCONTINUED | OUTPATIENT
Start: 2021-11-15 | End: 2021-11-15 | Stop reason: SDUPTHER

## 2021-11-15 RX ORDER — ONDANSETRON 2 MG/ML
4 INJECTION INTRAMUSCULAR; INTRAVENOUS EVERY 6 HOURS PRN
Status: DISCONTINUED | OUTPATIENT
Start: 2021-11-15 | End: 2021-11-15 | Stop reason: HOSPADM

## 2021-11-15 RX ORDER — MAGNESIUM SULFATE HEPTAHYDRATE 500 MG/ML
INJECTION, SOLUTION INTRAMUSCULAR; INTRAVENOUS PRN
Status: DISCONTINUED | OUTPATIENT
Start: 2021-11-15 | End: 2021-11-15 | Stop reason: SDUPTHER

## 2021-11-15 RX ORDER — FENTANYL CITRATE 50 UG/ML
25 INJECTION, SOLUTION INTRAMUSCULAR; INTRAVENOUS EVERY 5 MIN PRN
Status: DISCONTINUED | OUTPATIENT
Start: 2021-11-15 | End: 2021-11-15 | Stop reason: HOSPADM

## 2021-11-15 RX ORDER — PROMETHAZINE HYDROCHLORIDE 25 MG/ML
6.25 INJECTION, SOLUTION INTRAMUSCULAR; INTRAVENOUS
Status: DISCONTINUED | OUTPATIENT
Start: 2021-11-15 | End: 2021-11-15 | Stop reason: HOSPADM

## 2021-11-15 RX ORDER — OXYCODONE HYDROCHLORIDE 5 MG/1
5 TABLET ORAL EVERY 4 HOURS PRN
Status: DISCONTINUED | OUTPATIENT
Start: 2021-11-15 | End: 2021-11-15 | Stop reason: HOSPADM

## 2021-11-15 RX ORDER — HYDROMORPHONE HCL 110MG/55ML
0.5 PATIENT CONTROLLED ANALGESIA SYRINGE INTRAVENOUS EVERY 5 MIN PRN
Status: COMPLETED | OUTPATIENT
Start: 2021-11-15 | End: 2021-11-15

## 2021-11-15 RX ORDER — ROPIVACAINE HYDROCHLORIDE 5 MG/ML
INJECTION, SOLUTION EPIDURAL; INFILTRATION; PERINEURAL
Status: DISCONTINUED | OUTPATIENT
Start: 2021-11-15 | End: 2021-11-15 | Stop reason: SDUPTHER

## 2021-11-15 RX ORDER — LIDOCAINE HYDROCHLORIDE 20 MG/ML
INJECTION, SOLUTION EPIDURAL; INFILTRATION; INTRACAUDAL; PERINEURAL PRN
Status: DISCONTINUED | OUTPATIENT
Start: 2021-11-15 | End: 2021-11-15 | Stop reason: SDUPTHER

## 2021-11-15 RX ORDER — FENTANYL CITRATE 50 UG/ML
50 INJECTION, SOLUTION INTRAMUSCULAR; INTRAVENOUS EVERY 5 MIN PRN
Status: DISCONTINUED | OUTPATIENT
Start: 2021-11-15 | End: 2021-11-15 | Stop reason: HOSPADM

## 2021-11-15 RX ORDER — ASPIRIN 81 MG/1
81 TABLET ORAL 2 TIMES DAILY
Qty: 60 TABLET | Refills: 0 | Status: SHIPPED | OUTPATIENT
Start: 2021-11-15

## 2021-11-15 RX ORDER — IBUPROFEN 800 MG/1
800 TABLET ORAL EVERY 6 HOURS PRN
Qty: 120 TABLET | Refills: 3 | Status: SHIPPED | OUTPATIENT
Start: 2021-11-15

## 2021-11-15 RX ORDER — FENTANYL CITRATE 50 UG/ML
50 INJECTION, SOLUTION INTRAMUSCULAR; INTRAVENOUS ONCE
Status: COMPLETED | OUTPATIENT
Start: 2021-11-15 | End: 2021-11-15

## 2021-11-15 RX ADMIN — OXYCODONE 5 MG: 5 TABLET ORAL at 10:55

## 2021-11-15 RX ADMIN — KETOROLAC TROMETHAMINE 30 MG: 30 INJECTION, SOLUTION INTRAMUSCULAR; INTRAVENOUS at 08:16

## 2021-11-15 RX ADMIN — LIDOCAINE HYDROCHLORIDE 100 MG: 20 INJECTION, SOLUTION EPIDURAL; INFILTRATION; INTRACAUDAL; PERINEURAL at 07:05

## 2021-11-15 RX ADMIN — HYDROMORPHONE HYDROCHLORIDE 0.5 MG: 2 INJECTION, SOLUTION INTRAMUSCULAR; INTRAVENOUS; SUBCUTANEOUS at 09:58

## 2021-11-15 RX ADMIN — TRANEXAMIC ACID 1000 MG: 1 INJECTION, SOLUTION INTRAVENOUS at 08:13

## 2021-11-15 RX ADMIN — FENTANYL CITRATE 50 MCG: 50 INJECTION INTRAMUSCULAR; INTRAVENOUS at 06:45

## 2021-11-15 RX ADMIN — KETAMINE HYDROCHLORIDE 30 MG: 10 INJECTION, SOLUTION INTRAMUSCULAR; INTRAVENOUS at 07:00

## 2021-11-15 RX ADMIN — TRANEXAMIC ACID 1000 MG: 1 INJECTION, SOLUTION INTRAVENOUS at 06:58

## 2021-11-15 RX ADMIN — HYDROMORPHONE HYDROCHLORIDE 0.5 MG: 2 INJECTION, SOLUTION INTRAMUSCULAR; INTRAVENOUS; SUBCUTANEOUS at 09:28

## 2021-11-15 RX ADMIN — SUCCINYLCHOLINE CHLORIDE 120 MG: 20 INJECTION, SOLUTION INTRAMUSCULAR; INTRAVENOUS at 07:06

## 2021-11-15 RX ADMIN — HYDROMORPHONE HYDROCHLORIDE 0.5 MG: 2 INJECTION, SOLUTION INTRAMUSCULAR; INTRAVENOUS; SUBCUTANEOUS at 09:01

## 2021-11-15 RX ADMIN — ROCURONIUM BROMIDE 50 MG: 10 INJECTION, SOLUTION INTRAVENOUS at 07:15

## 2021-11-15 RX ADMIN — HYDROMORPHONE HYDROCHLORIDE 0.5 MG: 2 INJECTION, SOLUTION INTRAMUSCULAR; INTRAVENOUS; SUBCUTANEOUS at 09:11

## 2021-11-15 RX ADMIN — CEFAZOLIN 2000 MG: 10 INJECTION, POWDER, FOR SOLUTION INTRAVENOUS at 10:48

## 2021-11-15 RX ADMIN — SODIUM CHLORIDE, POTASSIUM CHLORIDE, SODIUM LACTATE AND CALCIUM CHLORIDE: 600; 310; 30; 20 INJECTION, SOLUTION INTRAVENOUS at 07:28

## 2021-11-15 RX ADMIN — SODIUM CHLORIDE, POTASSIUM CHLORIDE, SODIUM LACTATE AND CALCIUM CHLORIDE: 600; 310; 30; 20 INJECTION, SOLUTION INTRAVENOUS at 06:40

## 2021-11-15 RX ADMIN — MIDAZOLAM 1 MG: 1 INJECTION INTRAMUSCULAR; INTRAVENOUS at 06:57

## 2021-11-15 RX ADMIN — MAGNESIUM SULFATE HEPTAHYDRATE 1 G: 500 INJECTION, SOLUTION INTRAMUSCULAR; INTRAVENOUS at 07:10

## 2021-11-15 RX ADMIN — PROPOFOL 150 MG: 10 INJECTION, EMULSION INTRAVENOUS at 07:06

## 2021-11-15 RX ADMIN — HYDROCODONE BITARTRATE AND ACETAMINOPHEN 1 TABLET: 5; 325 TABLET ORAL at 15:20

## 2021-11-15 RX ADMIN — ONDANSETRON 4 MG: 2 INJECTION INTRAMUSCULAR; INTRAVENOUS at 07:10

## 2021-11-15 RX ADMIN — CEFAZOLIN 2000 MG: 10 INJECTION, POWDER, FOR SOLUTION INTRAVENOUS at 06:55

## 2021-11-15 RX ADMIN — SODIUM CHLORIDE, POTASSIUM CHLORIDE, SODIUM LACTATE AND CALCIUM CHLORIDE: 600; 310; 30; 20 INJECTION, SOLUTION INTRAVENOUS at 06:37

## 2021-11-15 RX ADMIN — DEXAMETHASONE SODIUM PHOSPHATE 4 MG: 4 INJECTION, SOLUTION INTRAMUSCULAR; INTRAVENOUS at 07:10

## 2021-11-15 RX ADMIN — OXYCODONE 5 MG: 5 TABLET ORAL at 12:12

## 2021-11-15 RX ADMIN — ROPIVACAINE HYDROCHLORIDE 20 ML: 5 INJECTION, SOLUTION EPIDURAL; INFILTRATION; PERINEURAL at 06:49

## 2021-11-15 RX ADMIN — SODIUM CHLORIDE: 9 INJECTION, SOLUTION INTRAVENOUS at 09:13

## 2021-11-15 RX ADMIN — BUPIVACAINE HYDROCHLORIDE 10 ML: 2.5 INJECTION, SOLUTION EPIDURAL; INFILTRATION; INTRACAUDAL; PERINEURAL at 06:53

## 2021-11-15 RX ADMIN — SUGAMMADEX 200 MG: 100 INJECTION, SOLUTION INTRAVENOUS at 08:16

## 2021-11-15 ASSESSMENT — PULMONARY FUNCTION TESTS
PIF_VALUE: 15
PIF_VALUE: 15
PIF_VALUE: 0
PIF_VALUE: 1
PIF_VALUE: 3
PIF_VALUE: 2
PIF_VALUE: 15
PIF_VALUE: 4
PIF_VALUE: 5
PIF_VALUE: 15
PIF_VALUE: 16
PIF_VALUE: 16
PIF_VALUE: 1
PIF_VALUE: 6
PIF_VALUE: 16
PIF_VALUE: 15
PIF_VALUE: 16
PIF_VALUE: 15
PIF_VALUE: 15
PIF_VALUE: 1
PIF_VALUE: 17
PIF_VALUE: 1
PIF_VALUE: 14
PIF_VALUE: 15
PIF_VALUE: 16
PIF_VALUE: 15
PIF_VALUE: 0
PIF_VALUE: 4
PIF_VALUE: 15
PIF_VALUE: 15
PIF_VALUE: 0
PIF_VALUE: 24
PIF_VALUE: 15
PIF_VALUE: 16
PIF_VALUE: 15
PIF_VALUE: 15
PIF_VALUE: 16
PIF_VALUE: 15
PIF_VALUE: 16
PIF_VALUE: 15
PIF_VALUE: 15
PIF_VALUE: 16
PIF_VALUE: 16
PIF_VALUE: 13
PIF_VALUE: 16
PIF_VALUE: 15
PIF_VALUE: 3
PIF_VALUE: 15
PIF_VALUE: 16
PIF_VALUE: 15
PIF_VALUE: 16
PIF_VALUE: 1
PIF_VALUE: 15
PIF_VALUE: 16
PIF_VALUE: 4
PIF_VALUE: 14
PIF_VALUE: 15
PIF_VALUE: 26
PIF_VALUE: 15
PIF_VALUE: 3
PIF_VALUE: 0
PIF_VALUE: 15
PIF_VALUE: 3
PIF_VALUE: 15
PIF_VALUE: 4
PIF_VALUE: 16
PIF_VALUE: 15
PIF_VALUE: 15
PIF_VALUE: 16
PIF_VALUE: 15
PIF_VALUE: 16
PIF_VALUE: 15
PIF_VALUE: 15
PIF_VALUE: 16
PIF_VALUE: 4
PIF_VALUE: 15
PIF_VALUE: 1
PIF_VALUE: 15
PIF_VALUE: 4
PIF_VALUE: 15
PIF_VALUE: 0
PIF_VALUE: 3
PIF_VALUE: 15
PIF_VALUE: 4
PIF_VALUE: 1
PIF_VALUE: 15

## 2021-11-15 ASSESSMENT — PAIN SCALES - GENERAL
PAINLEVEL_OUTOF10: 3
PAINLEVEL_OUTOF10: 4
PAINLEVEL_OUTOF10: 0
PAINLEVEL_OUTOF10: 2
PAINLEVEL_OUTOF10: 5
PAINLEVEL_OUTOF10: 4
PAINLEVEL_OUTOF10: 2
PAINLEVEL_OUTOF10: 4
PAINLEVEL_OUTOF10: 6
PAINLEVEL_OUTOF10: 4
PAINLEVEL_OUTOF10: 5

## 2021-11-15 ASSESSMENT — PAIN DESCRIPTION - LOCATION
LOCATION: KNEE

## 2021-11-15 ASSESSMENT — PAIN DESCRIPTION - FREQUENCY
FREQUENCY: CONTINUOUS
FREQUENCY: CONTINUOUS

## 2021-11-15 ASSESSMENT — PAIN DESCRIPTION - ORIENTATION
ORIENTATION: RIGHT

## 2021-11-15 ASSESSMENT — PAIN DESCRIPTION - ONSET
ONSET: ON-GOING
ONSET: AWAKENED FROM SLEEP
ONSET: PROGRESSIVE

## 2021-11-15 ASSESSMENT — PAIN DESCRIPTION - PAIN TYPE
TYPE: SURGICAL PAIN

## 2021-11-15 ASSESSMENT — PAIN DESCRIPTION - DESCRIPTORS
DESCRIPTORS: ACHING;DISCOMFORT

## 2021-11-15 ASSESSMENT — PAIN DESCRIPTION - PROGRESSION: CLINICAL_PROGRESSION: GRADUALLY IMPROVING

## 2021-11-15 ASSESSMENT — PAIN - FUNCTIONAL ASSESSMENT: PAIN_FUNCTIONAL_ASSESSMENT: 0-10

## 2021-11-15 ASSESSMENT — LIFESTYLE VARIABLES: SMOKING_STATUS: 0

## 2021-11-15 NOTE — ANESTHESIA POSTPROCEDURE EVALUATION
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        Anesthesia Post Evaluation    Patient location during evaluation: PACU  Patient participation: complete - patient participated  Level of consciousness: awake  Airway patency: patent  Nausea & Vomiting: no vomiting  Complications: no  Cardiovascular status: hemodynamically stable  Respiratory status: acceptable  Hydration status: euvolemic  Multimodal analgesia pain management approach

## 2021-11-15 NOTE — H&P
Date of Surgery Update:  Swapna Benavides was seen, history and physical examination reviewed, and patient examined by me today. There have been no significant clinical changes since the completion of the previous history and physical.    The risk, benefits, and alternatives of the proposed procedure have been explained to the patient (or appropriate guardian) and understanding verbalized. All questions answered. Patient wishes to proceed.     Electronically signed by: Chanda James MD,11/15/2021,6:51 AM

## 2021-11-15 NOTE — PROGRESS NOTES
Discharge instructions reviewed with Shadia Benavides nurse navigator. Awaiting therapy for eval this afternoon. Pain well controlled and at tolerable level. VSS.  provided update via phone. Daughter will pick pt up when ready.

## 2021-11-15 NOTE — PROGRESS NOTES
Transferred to phase 2 level of care at this time. VSS. O2 removed and dilaudid 0.5 mg repeated for continued but improving pain control. Neurovascular checks WNL to BLE. Pulses +3. Pt reports she has walker at home and crutches if needed. IVFs @ 125 ml/hr per pump.

## 2021-11-15 NOTE — PROGRESS NOTES
Ancef 2 grams as ordered. Pain rated 3/10 oxycodone 5 mg po at this time. Able to tolerate crackers and water without nausea.  provided phone update. Pt has physical therapy scheduled for Wednesday. Medications delivered to bedside and pt instructed on incentive spirometry. Voided per bedpan. Call placed for therapy to eval. Pt given menu for lunch. Neurovascular checks WNL to BLE. Pulses +3.

## 2021-11-15 NOTE — ANESTHESIA PRE PROCEDURE
Department of Anesthesiology  Preprocedure Note       Name:  Rene Daly   Age:  62 y.o.  :  1960                                          MRN:  [id number]         Date:  11/15/2021      Surgeon: Jason Lott):  Renetta Roque MD    Procedure: Procedure(s):  RIGHT KNEE PATELLOFEMORAL ARTHROPLASTY WITH ROBOTIC NAVIGATION - KERN AND NEPHEW    Medications prior to admission:   Prior to Admission medications    Medication Sig Start Date End Date Taking? Authorizing Provider   oxyCODONE (ROXICODONE) 5 MG immediate release tablet 15 mg.  10/26/21   Historical Provider, MD   acetaminophen (APAP EXTRA STRENGTH) 500 MG tablet Take 2 tablets by mouth every 6 hours as needed for Pain 21   Renetta Roque MD   pregabalin (LYRICA) 100 MG capsule TAKE 1 CAPSULE BY MOUTH EVERY DAY  Patient taking differently: nightly. 9/16/21 3/16/22  LANCE Rhoades CNP   MAGNESIUM-POTASSIUM PO Take by mouth daily    Historical Provider, MD   Calcium Carbonate-Vit D-Min (CALCIUM 1200 PO) Take by mouth daily    Historical Provider, MD   Multiple Vitamins-Minerals (THERAPEUTIC MULTIVITAMIN-MINERALS) tablet Take 1 tablet by mouth daily    Historical Provider, MD   MYRBETRIQ 50 MG TB24 TAKE 1 TABLET BY MOUTH EVERY DAY 21   Historical Provider, MD   buPROPion (WELLBUTRIN XL) 300 MG extended release tablet Take 1 tablet by mouth every morning 21   LANCE Rhoades CNP   valsartan (DIOVAN) 80 MG tablet Take 1 tablet by mouth daily 21   LANCE Rhoades CNP   traZODone (DESYREL) 100 MG tablet TAKE 2 TABLETS BY MOUTH EVERY NIGHT  Patient taking differently:  Only taking 1 tablet day 21   LANCE Rhoades CNP   Estradiol (YUVAFEM) 10 MCG TABS vaginal tablet Place 1 tablet vaginally Twice a Week 20   Chrystal Nichols MD   esomeprazole (NEXIUM) 40 MG delayed release capsule Take 1 capsule by mouth every morning (before breakfast) 20   LANCE Rhoades CNP   Omega-3 Fatty Acids ([address] OIL PO) Take by mouth    Historical Provider, MD   Cholecalciferol (VITAMIN D PO) Take by mouth    Historical Provider, MD       Current medications:    No current facility-administered medications for this visit. No current outpatient medications on file. Facility-Administered Medications Ordered in Other Visits   Medication Dose Route Frequency Provider Last Rate Last Admin    ortho mix injection   Injection On Call Lise Young MD        tranexamic acid (CYKLOKAPRON) 1,000 mg in sodium chloride 0.9 % 50 mL IVPB  1,000 mg IntraVENous Once Lise Young MD        tranexamic acid (CYKLOKAPRON) 1,000 mg in sodium chloride 0.9 % 50 mL IVPB  1,000 mg IntraVENous On Call to [address] [place] [address], MD        HYDROmorphone (DILAUDID) injection 0.25 mg  0.25 mg IntraVENous Q5 Min PRN Carolynne Dubin, MD        HYDROmorphone (DILAUDID) injection 0.5 mg  0.5 mg IntraVENous Q5 Min PRN Carolynne Dubin, MD        fentaNYL (SUBLIMAZE) injection 25 mcg  25 mcg IntraVENous Q5 Min PRN Carolynne Dubin, MD        fentaNYL (SUBLIMAZE) injection 50 mcg  50 mcg IntraVENous Q5 Min PRN Carolynne Dubin, MD        HYDROcodone-acetaminophen (NORCO) 5-325 MG per tablet 1 tablet  1 tablet Oral Once PRN Carolynne Dubin, MD        [facility]) injection 4 mg  4 mg IntraVENous Once PRN Carolynne Dubin, MD        promethazine (PHENERGAN) injection 6.25 mg  6.25 mg IntraVENous Once PRN Carolynne Dubin, MD           Allergies: Allergies   Allergen Reactions    Lisinopril      Cough       Problem List:    Patient Active Problem List   Diagnosis Code    Hemorrhoid K64.9    Nevus D22.9    Lipid disorder E78.9    Fibrocystic disease of breast N60.19    Acne necrotica L70.2    Depression F32. A    GERD (gastroesophageal reflux disease) K21.9    Vitamin D deficiency E55.9    Postmenopausal osteoporosis M81.0    Chronic pain of both knees M25.561, M25.562, G89.29    Hyperlipidemia E78.5    Chronic pain of both shoulders M25.511, G89.29, M25.512    Chronic left-sided low back pain without sciatica M54.50, G89.29    NSTEMI (non-ST elevated myocardial infarction) (HCC) I21.4    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    History of colonic polyps Z86.010    Primary osteoarthritis of left knee M17.12       Past Medical History:        Diagnosis Date    Alcoholism ([facility] [address]     Recovering    Cardiomyopathy ([facility] [address] [facility]     Chronic left-sided low back pain without sciatica 5/26/2020    Degenerative joint disease     Depression     Fibrocystic disease of breast 6/15/2010    GERD (gastroesophageal reflux disease)     Headache(784.0)     Hemorrhoid 6/15/2010    Hyperlipidemia 6/25/2019    Mitral valve regurgitation     Osteoporosis     Screening mammogram, encounter for 02/04/2018    Dr Shelley Katz MD    Urinary incontinence     Vitamin D deficiency 3/16/2012       Past Surgical History:        Procedure Laterality Date    APPENDECTOMY  01/13/2021    BLADDER SURGERY      with hysterectomy-vaginal approach    BREAST SURGERY  2006    benign cyst on left    CARDIAC CATHETERIZATION  01/29/2021    COLONOSCOPY  11/30/2012    Dr Longoria [place]      facial 12.11    FACIAL COSMETIC SURGERY      HYSTERECTOMY  1996    KNEE ARTHROPLASTY Left 9/20/2021    OPEN PATELLOFEMORAL  ARTHROPLASTY WITH ROBOTIC NAVIGATION- LEFT KNEE  - [facility] & NEPHENRRIQUE ([id number]) performed by Renetta Roque MD at [address] [place] [address] Stret SALPINGO-OOPHORECTOMY      still with left ovary       Social History:    Social History     Tobacco Use    Smoking status: Never Smoker    Smokeless tobacco: Never Used   Substance Use Topics    Alcohol use: No     Alcohol/week: 0.0 standard drinks     Comment: none since 2003                                Counseling given: Not Answered      Vital Signs (Current): There were no vitals filed for this visit.                                            BP Readings from Last 3 Encounters:   11/03/21 112/76   09/20/21 (!) 118/53   09/20/21 134/78       NPO Status:                                                                                 BMI:   Wt Readings from Last 3 Encounters:   10/18/21 155 lb (70.3 kg)   11/03/21 161 lb (73 kg)   09/20/21 162 lb (73.5 kg)     There is no height or weight on file to calculate BMI.    CBC:   Lab Results   Component Value Date    WBC 4.7 10/23/2021    RBC 4.11 10/23/2021    HGB 12.7 10/23/2021    HCT 37.9 10/23/2021    MCV 92.2 10/23/2021    RDW 12.8 10/23/2021     10/23/2021       CMP:   Lab Results   Component Value Date     10/23/2021    K 4.4 10/23/2021     10/23/2021    CO2 24 10/23/2021    BUN 14 10/23/2021    CREATININE 0.8 10/23/2021    GFRAA >60 10/23/2021    GFRAA >60 05/23/2013    AGRATIO 1.9 04/18/2019    LABGLOM >60 10/23/2021    GLUCOSE 80 10/23/2021    PROT 6.9 04/18/2019    PROT 6.9 03/13/2012    CALCIUM 9.7 10/23/2021    BILITOT 0.5 04/18/2019    ALKPHOS 65 04/18/2019    AST 15 04/18/2019    ALT 11 04/18/2019       POC Tests: No results for input(s): POCGLU, POCNA, POCK, POCCL, POCBUN, POCHEMO, POCHCT in the last 72 hours.     Coags:   Lab Results   Component Value Date    PROTIME 10.9 10/23/2021    INR 0.97 10/23/2021    APTT 37.4 10/23/2021       HCG (If Applicable): No results found for: PREGTESTUR, PREGSERUM, HCG, HCGQUANT     ABGs: No results found for: PHART, PO2ART, OFH2EPP, SAC2INU, BEART, A6ACOWHY     Type & Screen (If Applicable):  No results found for: LABABO, LABRH    Drug/Infectious Status (If Applicable):  No results found for: HIV, HEPCAB    COVID-19 Screening (If Applicable): No results found for: COVID19        Anesthesia Evaluation  Patient summary reviewed and Nursing notes reviewed no history of anesthetic complications:   Airway: Mallampati: II  TM distance: >3 FB   Neck ROM: full  Mouth opening: > = 3 FB Dental: normal exam         Pulmonary:normal exam  breath sounds clear to auscultation      (-) COPD, asthma, sleep apnea and not a current smoker                           Cardiovascular:    (+) hypertension:, past MI (NSTEMI 1/21):, CHF (NICM, echo 4/21 EF 45 no RWMA gr 1 DD; no current s/s of failure or acs, compensated and stable):, hyperlipidemia    (-) CAD (neg cath 1/21), CABG/stent, dysrhythmias and  angina    ECG reviewed  Rhythm: regular  Rate: normal  Echocardiogram reviewed                  Neuro/Psych:   (+) neuromuscular disease (chronic lbp):, headaches:, psychiatric history (Alcoholism hx, recovering):depression/anxiety    (-) seizures, TIA and CVA           GI/Hepatic/Renal:   (+) GERD: well controlled,      (-) liver disease and no renal disease       Endo/Other:    (+) : arthritis: OA., .    (-) diabetes mellitus, hypothyroidism, hyperthyroidism               Abdominal:             Vascular: Other Findings:               Anesthesia Plan      general and regional     ASA 3     (Consented for ipack and adductor canal block, risks/benefits discussed including nerve damage, bleeding, infection, and local anesthetic toxicity, patient understanding and wishes to proceed)  Induction: intravenous. MIPS: Postoperative opioids intended and Prophylactic antiemetics administered. Anesthetic plan and risks discussed with patient. Use of blood products discussed with patient whom consented to blood products. Plan discussed with CRNA.                 Isela Mendoza MD   11/15/2021

## 2021-11-15 NOTE — PROGRESS NOTES
notified; Gait belt (Left in PACU bed)           Patient Diagnosis(es): The primary encounter diagnosis was Preop testing. A diagnosis of Arthritis of knee, right was also pertinent to this visit. has a past medical history of Alcoholism ([facility] [address] Cardiomyopathy ([facility] [address] Chronic left-sided low back pain without sciatica, Degenerative joint disease, Depression, Fibrocystic disease of breast, GERD (gastroesophageal reflux disease), Headache(784.0), Hemorrhoid, Hyperlipidemia, Mitral valve regurgitation, Osteoporosis, Screening mammogram, encounter for, Urinary incontinence, and Vitamin D deficiency. has a past surgical history that includes Salpingo-oophorectomy; Cosmetic surgery; Facial cosmetic surgery; Hysterectomy (1996); Breast surgery (2006); Colonoscopy (11/30/2012); Bladder surgery; Appendectomy (01/13/2021); Cardiac catheterization (01/29/2021); and Knee Arthroplasty (Left, 9/20/2021). Treatment Diagnosis: Decreeased ADL/IADL status, functional mobility, and endurance associated with S/P RIGHT KNEE PATELLOFEMORAL ARTHROPLASTY 11/15/21      Restrictions  Restrictions/Precautions  Restrictions/Precautions: Weight Bearing, Fall Risk (HIGH FALL RISK)  Lower Extremity Weight Bearing Restrictions  Right Lower Extremity Weight Bearing: Weight Bearing As Tolerated  Position Activity Restriction  Other position/activity restrictions: S/P RIGHT KNEE PATELLOFEMORAL ARTHROPLASTY 11/15/21    Subjective   General  Chart Reviewed: Yes  Family / Caregiver Present: No  Diagnosis: s/p RIGHT KNEE PATELLOFEMORAL ARTHROPLASTY  Subjective  Subjective: Pt supine in PACU bed, pleasant and agreeable to OT eval. Pt reports 2/10 surgical pain.   Patient Currently in Pain: Yes  Pain Assessment  Pain Assessment: 0-10  Pain Level: 2  Pain Type: Surgical pain  Pain Location: Knee  Pain Orientation: Right  Pre Treatment Pain Screening  Intervention List: Patient able to continue with treatment  Vital Signs  Patient Currently in Pain: Yes  Social/Functional History  Social/Functional History  Lives With: Family (spouse and daughter)  Type of Home: House  Home Layout: Able to Live on Main level with bedroom/bathroom, Two level  Home Access: Stairs to enter without rails  Entrance Stairs - Number of Steps: 2 VAISHNAVI  Bathroom Shower/Tub: Walk-in shower, Shower chair with back  [employer]: Handicap height  Bathroom Equipment: Hand-held shower, Shower chair (ordered shower chair)  Home Equipment: Rolling walker, Crutches  ADL Assistance: Independent  Homemaking Assistance: Independent (light cleaning; daughter assists as needed)  Ambulation Assistance: Independent  Transfer Assistance: Independent  Active : Yes  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        Objective   Vision: Impaired  Vision Exceptions: Wears glasses for reading (and for distance/watching TV)  Hearing: Exceptions to [facility]  Hearing Exceptions: Hard of hearing/hearing concerns          Balance  Sitting Balance: Supervision (on toilet)  Standing Balance: Stand by assistance (SBA improving to Supervision w/RW)  Standing Balance  Time: ~1 min, ~3 min  Activity: functional mobility to restroom for toilet transfer; clothing mgt, washing hands in stance at sink, and functional mobility to PACU bed; ~50 ft, ~65 ft  Functional Mobility  Functional - Mobility Device: Rolling Walker  Activity: To/from bathroom  Assist Level: Stand by assistance (SBA progressing to Supervision)  Toilet Transfers  Toilet - Technique: Ambulating  Equipment Used: Standard toilet (grab bar)  Toilet Transfer: Stand by assistance  ADL  Grooming: Stand by assistance; Supervision (wash hands in stance at sink)  LE Dressing: Setup; Minimal assistance (D don socks, SBA don underpants, sweatpants)  Toileting: Stand by assistance  Tone RUE  RUE Tone: Normotonic  Tone LUE  LUE Tone: Normotonic  Coordination  Movements Are Fluid And Coordinated: Yes     Bed mobility  Supine to Sit: Modified independent  Sit to Supine: Modified independent  Scooting: Modified independent  Transfers  Stand Step Transfers: Stand by assistance; Supervision (w/RW)  Sit to stand: Stand by assistance; Supervision  Stand to sit: Stand by assistance; Supervision     Cognition  Overall Cognitive Status: WFL        Sensation  Overall Sensation Status: WFL        LUE AROM (degrees)  LUE AROM : WNL  Left Hand AROM (degrees)  Left Hand AROM: WNL  RUE AROM (degrees)  RUE AROM : WNL  Right Hand AROM (degrees)  Right Hand AROM: WNL  LUE Strength  Gross LUE Strength: WNL (5/5 elbow, shoulder)  L Hand General: 5/5  RUE Strength  Gross RUE Strength: WNL (5/5 elbow, shoulder)  R Hand General: 5/5                   Plan   Plan  Plan Comment: D/C acute OT; pt being discharged.                AM-PAC Score        AM-[facility] Inpatient Daily Activity Raw Score: 20 (11/15/21 1648)  AM-PAC Inpatient ADL T-Scale Score : 42.03 (11/15/21 1648)  ADL Inpatient CMS 0-100% Score: 38.32 (11/15/21 1648)  ADL Inpatient CMS G-Code Modifier : CJ (11/15/21 1648)    Goals: None          Therapy Time   Individual Concurrent Group Co-treatment   Time In 1355         Time Out 1435         Minutes 40              Timed Code Treatment Minutes:    8 min (Minutes shared with PT d/t pt in OP surgery)    Total Treatment Minutes:  40 min      [address], Arvin Copa., OTR/L, [id number]

## 2021-11-15 NOTE — PROGRESS NOTES
Pt arrived to PACU from OR in Stable condition and is RASS -1 (Drowsy) . Respirations are Regular Pattern; RR 8-20 = 18 on 3L O2 per nasal cannula. Skin warm and dry. Abd is  soft. Pain: 5 at this time. Neurovascular checks WNL to RLE. Pulses +3. Pt has dreesing to Left knee from home and prior surgery 7 weeks ago. Right knee surgical site(s) intact with dressing= clean, dry and intact. Will continue to monitor for safety and comfort. Called for Xray. S/P: RIGHT KNEE PATELLOFEMORAL ARTHROPLASTY WITH ROBOTIC NAVIGATION - KERN AND NEPHEW (Right Knee), with Dr. Helen Howell at [facility].

## 2021-11-15 NOTE — PROGRESS NOTES
Vicodin 1 tab po at this time. Discharge instructions reviewed and pt 's questions answered. Daughter here and pt discharged home at this time.

## 2021-11-15 NOTE — PROGRESS NOTES
CLINICAL PHARMACY NOTE: MEDS TO BEDS    Total # of Prescriptions Filled: 4   The following medications were delivered to the patient:  · Oxycodone 5 mg  · Keflex 500 mg  · Hydromorphone 2 mg  · Aspirin 81 mg    Additional Documentation:    Delivered to Patient-signed  Pt didn't want meloxicam or Tylenol   Mike Oglesby CPhT

## 2021-11-15 NOTE — PROGRESS NOTES
Physical Therapy    Facility/Department: [facility] OR  Initial Assessment/Discharge Summary    NAME: Cynthia Smith  : 1960  MRN: [id number]    Date of Service: 11/15/2021    Discharge Recommendations: Cynthia Smith scored a 19/24 on the AM-PAC short mobility form. Current research shows that an AM-PAC score of 18 or greater is typically associated with a discharge to the patient's home setting. Based on the patient's AM-PAC score and their current functional mobility deficits, it is recommended that the patient have 2-3 sessions per week of Physical Therapy at d/c to increase the patient's independence. At this time, this patient demonstrates the endurance and safety to discharge home with OP services and a follow up treatment frequency of 2-3x/wk. Please see assessment section for further patient specific details. If patient discharges prior to next session this note will serve as a discharge summary. Please see below for the latest assessment towards goals. PT Equipment Recommendations  Equipment Needed: No  Other: has RW at home    Assessment   Body structures, Functions, Activity limitations: Decreased ROM; Decreased strength; Decreased endurance; Decreased balance  Assessment: Pt presents with the above deficits s/p R knee patellofemoral arthroplasty. Pt with PLOF of independence and currently requires SBA/Supervision with RW for mobility. Since pt is presenting at current functional levels, pt is being discharged from acute PT caseload however pt would benefit from OP PT upon d/c home to address deficits.   Treatment Diagnosis: impaired gait, transfers, and balance  Prognosis: Good  Decision Making: Low Complexity  Clinical Presentation: stable  PT Education: Goals; PT Role; Plan of Care; Home Exercise Program; Precautions; Transfer Training; Energy Conservation; Weight-bearing Education; Orientation; General Safety; Gait Training; Functional Mobility Training  Patient Education: d/c recommendations--pt verbalizing understanding  Barriers to Learning: none  REQUIRES PT FOLLOW UP: No  Activity Tolerance  Activity Tolerance: Patient Tolerated treatment well       Patient Diagnosis(es): The primary encounter diagnosis was Preop testing. A diagnosis of Arthritis of knee, right was also pertinent to this visit. has a past medical history of Alcoholism ([facility] [address] Cardiomyopathy ([facility] [address] Chronic left-sided low back pain without sciatica, Degenerative joint disease, Depression, Fibrocystic disease of breast, GERD (gastroesophageal reflux disease), Headache(784.0), Hemorrhoid, Hyperlipidemia, Mitral valve regurgitation, Osteoporosis, Screening mammogram, encounter for, Urinary incontinence, and Vitamin D deficiency. has a past surgical history that includes Salpingo-oophorectomy; Cosmetic surgery; Facial cosmetic surgery; Hysterectomy (1996); Breast surgery (2006); Colonoscopy (11/30/2012); Bladder surgery; Appendectomy (01/13/2021); Cardiac catheterization (01/29/2021); and Knee Arthroplasty (Left, 9/20/2021).     Restrictions  Restrictions/Precautions  Restrictions/Precautions: Weight Bearing, Fall Risk (HIGH FALL RISK)  Lower Extremity Weight Bearing Restrictions  Right Lower Extremity Weight Bearing: Weight Bearing As Tolerated  Position Activity Restriction  Other position/activity restrictions: S/P RIGHT KNEE PATELLOFEMORAL ARTHROPLASTY 11/15/21     Vision/Hearing  Vision: Impaired  Vision Exceptions: Wears glasses for reading (and for distance/watching TV)  Hearing: Exceptions to [facility]  Hearing Exceptions: Hard of hearing/hearing concerns       Subjective  General  Chart Reviewed: Yes  Response To Previous Treatment: Not applicable  Family / Caregiver Present: No  Diagnosis: RIGHT KNEE PATELLOFEMORAL ARTHROPLASTY  Follows Commands: Within Functional Limits  General Comment  Comments: Pt supine in bed upon arrival.  Subjective  Subjective: Pt agreeable to PT/OT eval, reporting 2/10 pain in R knee.  Pain Screening  Patient Currently in Pain: Yes  Pain Assessment  Pain Assessment: 0-10  Pain Level: 2  Pain Type: Surgical pain  Pain Location: Knee  Pain Orientation: Right  Vital Signs  Patient Currently in Pain: Yes       Orientation  Orientation  Overall Orientation Status: Within Functional Limits     Social/Functional History  Social/Functional History  Lives With: Family (spouse and daughter)  Type of Home: House  Home Layout: Able to Live on Main level with bedroom/bathroom, Two level  Home Access: Stairs to enter without rails  Entrance Stairs - Number of Steps: 2 VAISHNAVI  Bathroom Shower/Tub: Walk-in shower, Shower chair with back  [employer]: Handicap height  Bathroom Equipment: Hand-held shower, Shower chair (ordered shower chair)  Home Equipment: Rolling walker, Crutches  ADL Assistance: Independent  Homemaking Assistance: Independent (light cleaning; daughter assists as needed)  Ambulation Assistance: Independent  Transfer Assistance: Independent  Active : Yes  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      Cognition   Cognition  Overall Cognitive Status: WFL    Objective  AROM RLE (degrees)  RLE AROM: Exceptions  RLE General AROM: knee flexion: 81 degrees  AROM LLE (degrees)  LLE AROM : WFL  Strength RLE  Strength RLE: WFL  Comment: grossly 3+/5  Strength LLE  Strength LLE: WFL  Comment: grossly 4-/5  Tone RLE  RLE Tone: Normotonic  Tone LLE  LLE Tone: Normotonic  Motor Control  Gross Motor?: WFL  Sensation  Overall Sensation Status: WFL  Bed mobility  Supine to Sit: Modified independent  Sit to Supine: Modified independent  Scooting: Modified independent  Comment: HOB raised  Transfers  Sit to Stand: Stand by assistance; Supervision (x1 EOB, x1 toilet)  Stand to sit: Stand by assistance; Supervision  Comment: SBA>>>Supervision for transfers to RW  Ambulation  Ambulation?: Yes  Ambulation 1  Surface: level tile  Device: Rolling Walker  Assistance: Stand by assistance; Supervision (SBA>>Supervision)  Quality of Gait: slightly widened Caroline, decreased domingo, step-to progressing to swing through gait pattern  Distance: 50'+65'  Comments: Pt required slightly increased time to perform, no LOB. Stairs/Curb  Stairs?: No (unable to assess stair negotiation for home entry however discussed entry with RW with pt and pt verbalizing understanding and able to provide teachback for proper sequencing/negotiation)     Balance  Posture: Good  Sitting - Static: Good; - (Supervision seated at toilet for toileting ~5-8 minutes total)  Sitting - Dynamic: Good; - (SBA seated EOB during ADLs ~3-5 minutes total)  Standing - Static: Good; - (SBA standing with RW for UE support)  Standing - Dynamic: Good; - (SBA with RW for UE support)        Plan   Plan  Times per week: discharge  Safety Devices  Type of devices: Gait belt, Patient at risk for falls, Left in bed, Nurse notified (pt left supine in PACU bed (no call light/alarm available) PACU RN aware)  Restraints  Initially in place: No    G-Code       OutComes Score       AM-PAC Score  AM-PAC Inpatient Mobility Raw Score : 19 (11/15/21 1633)  AM-PAC Inpatient T-Scale Score : 45.44 (11/15/21 1633)  Mobility Inpatient CMS 0-100% Score: 41.77 (11/15/21 1633)  Mobility Inpatient CMS G-Code Modifier : CK (11/15/21 1633)          Goals  Short term goals  Time Frame for Short term goals: none, discharge from PT caseload       Therapy Time   Individual Concurrent Group Co-treatment   Time In 1355         Time Out 1435         Minutes 40              Timed Code Treatment Minutes:   0    Total Treatment Minutes:  40  Time spent with pt shared with OT as pt is under observation status. As such, pt is being billed 1 unit for evaluation.     [address] [place] [address] [id number]

## 2021-11-15 NOTE — PROGRESS NOTES
Blocks completed. Patient tolerated procedure with no difficulty. See anesthesia record. VSS. No distress noted. Continue to monitor.

## 2021-11-15 NOTE — PROGRESS NOTES
Patient states has a rw from her last surgery.  Her first OP PT visit is scheduled on Wednesday at [facility].

## 2021-11-15 NOTE — PROGRESS NOTES
Patient alert and oriented. Reviewed discharge, follow up, and medication instructions with patient. Patient states understanding. Educated patient  to wear nael hose for 2 weeks and to remove at night and use incentive spirometer and take all medication as directed. Patient given instructions that due to state law, pain medication was written for 7 days and cannot be filled early. Instructed patient to take small dose of narcotic as possible. Prescriptions handed to patient and/or family. Waiting on pt evaluation and patient to void before discharge.      Adrienne Magana RN

## 2021-11-15 NOTE — PROGRESS NOTES
Pt ordered sandwich for lunch. VSS Monitors discontinued. Awaiting therapy to [facility]. Dr Erika Singh in to check on pt, meloxicam causes gi upset so it was declined from pharmacy. New prescription for motrin 800mg. Pt will have it filled at there pharmacy. Oxycodone 5 mg po repeated for continued pain rated 4/10.

## 2021-11-15 NOTE — ANESTHESIA PROCEDURE NOTES
Peripheral Block    Patient location during procedure: pre-op  Start time: 11/15/2021 6:52 AM  End time: 11/15/2021 6:54 AM  Staffing  Performed: anesthesiologist   Anesthesiologist: Taryn Guillen MD  Preanesthetic Checklist  Completed: patient identified, IV checked, site marked, risks and benefits discussed, surgical consent, monitors and equipment checked, pre-op evaluation, timeout performed, anesthesia consent given, oxygen available and patient being monitored  Peripheral Block  Patient position: supine  Prep: ChloraPrep  Patient monitoring: cardiac monitor, continuous pulse ox, frequent blood pressure checks and IV access  Block type: iPacks  Laterality: right  Injection technique: single-shot  Guidance: ultrasound guided  Local infiltration: lidocaine  Infiltration strength: 1 %  Dose: 0.3 mL  Provider prep: mask and sterile gloves (sterile u/s probe cover)  Local infiltration: lidocaine  Needle  Needle type: non cutting tip. Needle gauge: 20 G  Needle length: 8 cm  Needle localization: anatomical landmarks and ultrasound guidance  Assessment  Injection assessment: negative aspiration for heme, no paresthesia on injection and local visualized surrounding nerve on ultrasound  Paresthesia pain: none  Slow fractionated injection: yes (neg aspiration for heme every 3 ml)  Hemodynamics: stable  Additional Notes  Patient tolerated procedure well. No immediate complications.     Medications Administered  Bupivacaine (MARCAINE) PF injection 0.25%, 10 mL  Reason for block: post-op pain management and at surgeon's request

## 2021-11-16 ENCOUNTER — TELEPHONE (OUTPATIENT)
Dept: INPATIENT UNIT | Age: 61
End: 2021-11-16

## 2021-11-16 NOTE — OP NOTE
[facility]                     [address] [facility], [address] [facility]                                OPERATIVE REPORT    PATIENT NAME: Erika CHAU                      :        1960  MED REC NO:   [id number]                          ROOM:  ACCOUNT NO:   [de-identified]                           ADMIT DATE: 11/15/2021  PROVIDER:     Joelle Lindquist MD    DATE OF PROCEDURE:  11/15/2021    PREOPERATIVE DIAGNOSIS:  Right knee patellofemoral arthritis. POSTOPERATIVE DIAGNOSIS:  Right knee patellofemoral arthritis. OPERATION PERFORMED:  Right knee patellofemoral replacement with robotic  navigation. PRIMARY SURGEON:  Joelle Lindquist MD    ANESTHESIA:  General endotracheal.  The patient also did receive an  adductor canal block. IMPLANT:  Syed and Nephew extra small patellofemoral replacement and a  Smith and Nephew size 29 mm oval patella. BLOOD LOSS:  50 mL. TOURNIQUET TIME:  46 minutes at 300 mmHg. CONDITION:  The patient postoperatively was stable. HISTORY:  The patient is a 81-year-old female with isolated  patellofemoral arthritis, who has failed nonoperative management with  cortisone injections, viscosupplementation injections, physical therapy. She continues to have pain that is primarily anterior in nature. She  did have a left knee patellofemoral replacement and did well with it. Therefore, we discussed further treatment options. We did recommend  patellofemoral replacement of the right knee. After discussing the  risks and benefits of that procedure with her, informed consent was  obtained. OPERATIVE PROCEDURE:  The patient was seen in the preoperative holding  area. The right knee was confirmed to be the operative extremity and it  was marked at that period of time. She did receive 2 gm of Ancef  preoperatively. She was then brought back to the operating room in the  supine position.   General endotracheal anesthesia was started per the  Anesthesia Service. The tourniquet was placed upon the patient's right  thigh and the right lower extremity at that time was prepped and draped  in the standard sterile fashion. A standard anterior midline incision was made directly over the anterior  aspect of the right knee. It was carried down sharply through the skin  and subcutaneous tissues. A medial parapatellar arthrotomy at that time  was performed. The joint was now inspected. Articular cartilage  demonstrates some grade 1 chondromalacia along the medial and lateral  femoral condyles with no significant articular defects. Within the  trochlear groove and the undersurface of the patella, there were some  grade 4 changes. The ACL and the PCL were found to be intact and medial  and lateral meniscus were found to be intact. We then placed our guide  pins for the robotic navigation into the femur. We then collected our  data points and created our digital image of the trochlear groove and  our operative plan. Once that was completed, we used the bur to bur out  our trochlear groove area. We then placed the trial implant on to the  trochlear groove and confirmed appropriate placement based on our  operative plan. Once that was completed, we drilled our lug holes for  our implant and placed our trial trochlear groove on to the femur. We  then everted the patella. A measured resection was done on the back  surface of the patella to cut it to a thickness of 13 mm. We then  placed a size 29 oval dome patella on the back surface of the patella  and reamed lug holes for that patella. We trialed with patella in  place, which demonstrated excellent patellar tracking. Once that was  completed, our trial components were removed from the knee. The soft  tissues were injected with an Orthomix local anesthetic solution.   The  knee was washed with an IrriSept antibacterial solution as well as  copious irrigation with pulse lavage and cement was mixed on the back  table. Once the cement was ready, we cemented our final components, which  included a Smith and Nephew extra small trochlear groove femoral  component followed by a size 29 Smith and Nephew oval dome patella. Any  excess cement was removed from the knee at that time and once the cement  had fully cured, we once again trialed the knee, which once again  demonstrated good patellar tracking. The knee was again washed with  pulse lavage. The tourniquet at that time was deflated. Electrocautery  was used to obtain hemostasis within the soft tissues. Capsular closure  was done at that time with a #1 Stratafix suture, followed by a 2-0  Vicryl suture, followed by a 4-0 Monocryl. Sterile dressings were  placed over the knee. Knee was placed into an Ace wrap. The patient at  that time was awakened from anesthesia and transferred to PACU in stable  condition.         Audrey High MD    D: 11/15/2021 10:21:02       T: 11/15/2021 18:15:14     [email or web address]  Job#: [id number]     Doc#: [id number]    CC:

## 2021-11-17 NOTE — TELEPHONE ENCOUNTER
11/16/2021 2:40pm    Attempted to contact patient. Left voicemail for patient stating purpose and call back number.    [facility]  Orthopedic Nurse Navigator  Phone number: [phone]  Future Appointments   Date Time Provider Gissel Aleman   12/13/2021 11:00 AM Markus Jean MD [facility] GYN MMA

## 2021-11-17 NOTE — TELEPHONE ENCOUNTER
Patient left voicemail. States is doing well, taking miralax, has help at home, and has an appt today for PT. Called patient back. Left voicemail for patient to call back for any needs, concerns, or questions.       Camron Gallego  Orthopedic Nurse Navigator  Phone number: [phone]  Future Appointments   Date Time Provider Gissel Aleman   12/13/2021 11:00 AM Wei Gonzalez MD [facility] [address] GYN MMA

## 2021-11-26 ENCOUNTER — VIRTUAL VISIT (OUTPATIENT)
Dept: FAMILY MEDICINE CLINIC | Age: 61
End: 2021-11-26
Payer: COMMERCIAL

## 2021-11-26 ENCOUNTER — TELEPHONE (OUTPATIENT)
Dept: FAMILY MEDICINE CLINIC | Age: 61
End: 2021-11-26

## 2021-11-26 DIAGNOSIS — S39.012A BACK STRAIN, INITIAL ENCOUNTER: Primary | ICD-10-CM

## 2021-11-26 PROCEDURE — 99213 OFFICE O/P EST LOW 20 MIN: CPT | Performed by: NURSE PRACTITIONER

## 2021-11-26 RX ORDER — OXYCODONE HYDROCHLORIDE 5 MG/1
TABLET ORAL
COMMUNITY
Start: 2021-11-22

## 2021-11-26 RX ORDER — METHOCARBAMOL 500 MG/1
500 TABLET, FILM COATED ORAL 3 TIMES DAILY PRN
COMMUNITY
Start: 2021-11-21 | End: 2021-11-26

## 2021-11-26 RX ORDER — CYCLOBENZAPRINE HCL 10 MG
10 TABLET ORAL
Qty: 30 TABLET | Refills: 0 | Status: SHIPPED | OUTPATIENT
Start: 2021-11-26 | End: 2022-01-24 | Stop reason: SDUPTHER

## 2021-11-26 ASSESSMENT — ENCOUNTER SYMPTOMS
GASTROINTESTINAL NEGATIVE: 1
BACK PAIN: 1
RESPIRATORY NEGATIVE: 1

## 2021-11-26 NOTE — PROGRESS NOTES
2021    TELEHEALTH EVALUATION -- Audio/Visual (During IZGGR-11 public health emergency)    Shyann Devlin (:  1960) has requested an audio/video evaluation for the following concern(s):    Complains of low back pain for at least one week. Denies injury. Was in ER on  with intense pain right side of spine. Was given Robaxin and is unsure if these are helping. Denies loss of bowel/bladder control or numbness in periarea. States she has had this in the past.     Review of Systems   Constitutional: Negative. Respiratory: Negative. Cardiovascular: Negative. Gastrointestinal: Negative. Genitourinary: Negative. Musculoskeletal: Positive for back pain. Neurological: Negative. Prior to Visit Medications    Medication Sig Taking? Authorizing Provider   oxyCODONE (ROXICODONE) 5 MG immediate release tablet  Yes Historical Provider, MD   cyclobenzaprine (FLEXERIL) 10 MG tablet Take 1 tablet by mouth every 8-12 hours as needed for Muscle spasms Yes LANCE Kaye - CNP   acetaminophen (APAP EXTRA STRENGTH) 500 MG tablet Take 2 tablets by mouth every 6 hours as needed for Pain Yes Sandra Owen MD   aspirin EC 81 MG EC tablet Take 1 tablet by mouth 2 times daily Yes Sandra Owen MD   ibuprofen (ADVIL;MOTRIN) 800 MG tablet Take 1 tablet by mouth every 6 hours as needed for Pain Yes Sandra Owen MD   pregabalin (LYRICA) 100 MG capsule TAKE 1 [address] [facility]  Patient taking differently: nightly.   Yes Theodora Merritt APRN - YVON   MAGNESIUM-POTASSIUM PO Take by mouth daily Yes Historical Provider, MD   Calcium Carbonate-Vit D-Min (CALCIUM 1200 PO) Take by mouth daily Yes Historical Provider, MD   Multiple Vitamins-Minerals (THERAPEUTIC MULTIVITAMIN-MINERALS) tablet Take 1 tablet by mouth daily Yes Historical Provider, MD   MYRBETRIQ 50 MG TB24 TAKE 1 TABLET BY MOUTH EVERY DAY Yes Historical Provider, MD   buPROPion (WELLBUTRIN XL) 300 MG extended release tablet Take 1 tablet by mouth every morning Yes LANCE Barboza CNP   valsartan (DIOVAN) 80 MG tablet Take 1 tablet by mouth daily Yes LANCE Barboza CNP   traZODone (DESYREL) 100 MG tablet TAKE 2 TABLETS BY MOUTH EVERY NIGHT  Patient taking differently:  Only taking 1 tablet day Yes LANCE Barboza CNP   Estradiol (YUVAFEM) 10 MCG TABS vaginal tablet Place 1 tablet vaginally Twice a Week Yes Reynaldo Mccollum MD   esomeprazole ([address]) 40 MG delayed release capsule Take 1 capsule by mouth every morning (before breakfast) Yes LANCE Barboza CNP   Omega-3 Fatty Acids (FISH OIL PO) Take by mouth Yes Historical Provider, MD   Cholecalciferol (VITAMIN D PO) Take by mouth Yes Historical Provider, MD     Past Medical History:   Diagnosis Date    [facility]ism ([facility] [address]     Recovering    Cardiomyopathy ([facility] [address]     Chronic left-sided low back pain without sciatica 5/26/2020    Degenerative joint disease     Depression     Fibrocystic disease of breast 6/15/2010    GERD (gastroesophageal reflux disease)     Headache(784.0)     Hemorrhoid 6/15/2010    Hyperlipidemia 6/25/2019    Mitral valve regurgitation     Osteoporosis     Screening mammogram, encounter for 02/04/2018    Dr Nimesh Campbell MD    Urinary incontinence     Vitamin D deficiency 3/16/2012     Past Surgical History:   Procedure Laterality Date    APPENDECTOMY  01/13/2021    BLADDER SURGERY      with hysterectomy-vaginal approach    BREAST SURGERY  2006    benign cyst on left    CARDIAC CATHETERIZATION  01/29/2021    COLONOSCOPY  11/30/2012    Dr Ananth Parisi      facial 12.11    FACIAL COSMETIC SURGERY      HYSTERECTOMY  1996    KNEE ARTHROPLASTY Left 9/20/2021    OPEN PATELLOFEMORAL  ARTHROPLASTY WITH ROBOTIC NAVIGATION- LEFT KNEE  - [employer]is [employer] NEPHEW ([id number]) performed by Maki Fernandes MD at [id number] [place] [address] [facility]      still with left ovary    TOTAL KNEE ARTHROPLASTY Right 11/15/2021    RIGHT KNEE PATELLOFEMORAL ARTHROPLASTY WITH ROBOTIC NAVIGATION - IGNACIO performed by Devorah Cole MD at [address]     Family History   Problem Relation Age of Onset    High Blood Pressure Mother     High Cholesterol Mother     High Blood Pressure Father     High Cholesterol Father     Arthritis Father     Arthritis Paternal Grandmother     Rheum Arthritis Neg Hx     Osteoarthritis Neg Hx     Asthma Neg Hx     Breast Cancer Neg Hx     Cancer Neg Hx     Diabetes Neg Hx     Heart Failure Neg Hx     Hypertension Neg Hx     Migraines Neg Hx     Ovarian Cancer Neg Hx     Rashes/Skin Problems Neg Hx     Seizures Neg Hx     Stroke Neg Hx     Thyroid Disease Neg Hx      Allergies   Allergen Reactions    Lisinopril      Cough     Social History     Tobacco Use    Smoking status: Never Smoker    Smokeless tobacco: Never Used   Vaping Use    Vaping Use: Never used   Substance Use Topics    Alcohol use: No     Alcohol/week: 0.0 standard drinks     Comment: none since 2003    Drug use: No      PHYSICAL EXAMINATION:  Vital Signs: (As obtained by patient/caregiver or practitioner observation)  There were no vitals taken for this visit. Respiratory rate appears normal  Constitutional: Appears well-developed and well-nourished. No apparent distress    Mental status: Alert and awake. Oriented to person/place/cathy. Able to follow commands    Eyes: EOM normal. Sclera normal. No discharge visible  HENT: Normocephalic, atraumatic. Mouth/Throat: Mucous membranes are moist. External Ears Normal    Neck: No visualized mass   Pulmonary/Chest: Respiratory effort normal.  No visualized signs of difficulty breathing or respiratory distress        Musculoskeletal:  Normal range of motion of neck  Neurological: No Facial Asymmetry (Cranial nerve 7 motor function) (limited exam to video visit).  No gaze palsy       Skin:  No significant exanthematous lesions or discoloration noted on facial

## 2021-11-26 NOTE — TELEPHONE ENCOUNTER
----- Message from Burgess Ferreira sent at 11/26/2021 12:01 PM EST -----  Subject: Message to Provider    QUESTIONS  Information for Provider? Pt, Wendi Chi called into give [facility] Rm this   information - [address] fax number? [phone]  ---------------------------------------------------------------------------  --------------  Alonso SANTOS  What is the best way for the office to contact you? Do not leave any   message, patient will call back for answer  Preferred Call Back Phone Number? [phone]  ---------------------------------------------------------------------------  --------------  SCRIPT ANSWERS  Relationship to Patient?  Self

## 2021-11-27 NOTE — TELEPHONE ENCOUNTER
In message, it states [place], not sure why it was routed to me.  Please pay attention to routing to correct provider

## 2021-12-08 ENCOUNTER — TELEPHONE (OUTPATIENT)
Dept: FAMILY MEDICINE CLINIC | Age: 61
End: 2021-12-08

## 2021-12-08 DIAGNOSIS — G89.29 CHRONIC PAIN OF RIGHT KNEE: Primary | ICD-10-CM

## 2021-12-08 DIAGNOSIS — M25.561 CHRONIC PAIN OF RIGHT KNEE: Primary | ICD-10-CM

## 2021-12-08 NOTE — TELEPHONE ENCOUNTER
----- Message from Shemar Morse sent at 12/8/2021  2:32 PM EST -----  Subject: Message to Provider    QUESTIONS  Information for Provider? Patient is requesting to up the dosage of Lyrica   prefers 200 mg   ---------------------------------------------------------------------------  --------------  CALL BACK INFO  What is the best way for the office to contact you? OK to leave message on   voicemail  Preferred Call Back Phone Number? [phone]  ---------------------------------------------------------------------------  --------------  SCRIPT ANSWERS  Relationship to Patient?  Self

## 2021-12-09 RX ORDER — PREGABALIN 150 MG/1
150 CAPSULE ORAL NIGHTLY
Qty: 90 CAPSULE | Refills: 0 | Status: SHIPPED | OUTPATIENT
Start: 2021-12-09 | End: 2022-03-01 | Stop reason: SDUPTHER

## 2021-12-15 ENCOUNTER — CARE COORDINATION (OUTPATIENT)
Dept: CARE COORDINATION | Age: 61
End: 2021-12-15

## 2021-12-15 NOTE — CARE COORDINATION
Placed call to introduce patient to care coordination and explain role of ACM. Patient declined care coordination at this time. Patient has this CTN/ACM contact information if future needs arise. No further outreach scheduled with this ACM.       [facility]  [phone]  [facility] [address]oney  [address] [place] [address]

## 2021-12-21 ENCOUNTER — TELEPHONE (OUTPATIENT)
Dept: FAMILY MEDICINE CLINIC | Age: 61
End: 2021-12-21

## 2021-12-21 ENCOUNTER — OFFICE VISIT (OUTPATIENT)
Dept: FAMILY MEDICINE CLINIC | Age: 61
End: 2021-12-21
Payer: COMMERCIAL

## 2021-12-21 DIAGNOSIS — G44.89 OTHER HEADACHE SYNDROME: Primary | ICD-10-CM

## 2021-12-21 DIAGNOSIS — R19.7 DIARRHEA, UNSPECIFIED TYPE: ICD-10-CM

## 2021-12-21 PROCEDURE — 99213 OFFICE O/P EST LOW 20 MIN: CPT | Performed by: NURSE PRACTITIONER

## 2021-12-21 ASSESSMENT — ENCOUNTER SYMPTOMS
RHINORRHEA: 0
SORE THROAT: 0
VOMITING: 0
NAUSEA: 1
COUGH: 0
SHORTNESS OF BREATH: 0
DIARRHEA: 1
ABDOMINAL PAIN: 0

## 2021-12-21 NOTE — TELEPHONE ENCOUNTER
Symptom duration, days:  [x] 1   [] 2   [] 3   [] 4   [] 5   [] 6   [] 7   [] 8   [] 9   [] 10   [] 11   [] 12   [] 13   [] 14 or greater    Symptom course:   [] Worsening     [] Stable     [] Improving    ** FEVER or Chills should be directed to the [facility] **  Needs a VV, but none available today. Please advise.   Patient had knee surgery 5 weeks ago and is still taking tylenol and oxycodone      [] Cough  [] Chest Congestion  [] Feeling short of breath  [] Sometimes  [] Frequently  [] All the time  [x] Headaches  [x]Tolerable  [] Severe  [] Sore throat  [] Muscle aches  [] Nausea  [x] Vomiting  []Unable to keep fluids down  [x] Diarrhea  []Severe    [x] OTHER SYMPTOMS: has not taken her temp       RISK FACTORS:    [] Pregnant or possibly pregnant  [] Age over 62  [] Diabetes  [x] Heart disease  [] Asthma  [] COPD/Other chronic lung diseases  [] Active Cancer  [] On Chemotherapy  [] Taking oral steroids  [] History Lymphoma/Leukemia  [] Close contact with a lab confirmed COVID-19 patient within 14 days of symptom onset  [] History of travel from affected geographical areas within 14 days of symptom onset    VITALS: if able to check at home   Temp:   Pulse:   Pulse Ox:

## 2021-12-21 NOTE — PROGRESS NOTES
2021      TELEHEALTH EVALUATION -- Audio/Visual (During PRMIU-62 public health emergency)    HPI:    Lia Qureshi (:  1960) has requested an audio/video evaluation for the following concern(s):    No known COVID exposure. Was at large work party 1 week ago. She has received 2 COVID vaccines. URI   This is a new problem. The current episode started yesterday. The problem has been gradually improving. Associated symptoms include congestion, diarrhea, headaches and nausea. Pertinent negatives include no abdominal pain, coughing, ear pain, rhinorrhea, sore throat or vomiting. Review of Systems   Constitutional: Positive for chills and fatigue. Negative for fever. HENT: Positive for congestion. Negative for ear pain, rhinorrhea and sore throat. Respiratory: Negative for cough and shortness of breath. Gastrointestinal: Positive for diarrhea and nausea. Negative for abdominal pain and vomiting. Neurological: Positive for headaches. Prior to Visit Medications    Medication Sig Taking? Authorizing Provider   pregabalin (LYRICA) 150 MG capsule Take 1 capsule by mouth nightly for 90 days.  Yes LANCE Gil CNP   oxyCODONE (ROXICODONE) 5 MG immediate release tablet  Yes Historical Provider, MD   acetaminophen (APAP EXTRA STRENGTH) 500 MG tablet Take 2 tablets by mouth every 6 hours as needed for Pain Yes Devorah Cole MD   aspirin EC 81 MG EC tablet Take 1 tablet by mouth 2 times daily Yes Devorah Cole MD   ibuprofen (ADVIL;MOTRIN) 800 MG tablet Take 1 tablet by mouth every 6 hours as needed for Pain Yes Devorah Cole MD   MAGNESIUM-POTASSIUM PO Take by mouth daily Yes Historical Provider, MD   Calcium Carbonate-Vit D-Min (CALCIUM 1200 PO) Take by mouth daily Yes Historical Provider, MD   Multiple Vitamins-Minerals (THERAPEUTIC MULTIVITAMIN-MINERALS) tablet Take 1 tablet by mouth daily Yes Historical Provider, MD   MYRBETRIQ 50 MG TB24 TAKE 1 TABLET BY MOUTH EVERY DAY Yes Historical Provider, MD   buPROPion (WELLBUTRIN XL) 300 MG extended release tablet Take 1 tablet by mouth every morning Yes LANCE Ruth CNP   valsartan (DIOVAN) 80 MG tablet Take 1 tablet by mouth daily Yes LANCE Ruth CNP   traZODone (DESYREL) 100 MG tablet TAKE 2 TABLETS BY MOUTH EVERY NIGHT  Patient taking differently:  Only taking 1 tablet day Yes LANCE Ruth CNP   Estradiol (YUVAFEM) 10 MCG TABS vaginal tablet Place 1 tablet vaginally Twice a Week Yes Karlo Rosenthal MD   esomeprazole (NEXIUM) 40 MG delayed release capsule Take 1 capsule by mouth every morning (before breakfast) Yes LANCE Ruth CNP   Omega-3 Fatty Acids (FISH OIL PO) Take by mouth Yes Historical Provider, MD   Cholecalciferol (VITAMIN D PO) Take by mouth Yes Historical Provider, MD       Past Medical History:   Diagnosis Date    [facility]ism ([facility] [address]     Recovering    Cardiomyopathy ([facility] [address]     Chronic left-sided low back pain without sciatica 5/26/2020    Degenerative joint disease     Depression     Fibrocystic disease of breast 6/15/2010    GERD (gastroesophageal reflux disease)     Headache(784.0)     Hemorrhoid 6/15/2010    Hyperlipidemia 6/25/2019    Mitral valve regurgitation     Osteoporosis     Screening mammogram, encounter for 02/04/2018    Dr Derrick Marin MD    Urinary incontinence     Vitamin D deficiency 3/16/2012       Past Surgical History:   Procedure Laterality Date    APPENDECTOMY  01/13/2021    BLADDER SURGERY      with hysterectomy-vaginal approach    BREAST SURGERY  2006    benign cyst on left    CARDIAC CATHETERIZATION  01/29/2021    COLONOSCOPY  11/30/2012    Dr Chilo Christianson      facial 12.11    FACIAL COSMETIC SURGERY      HYSTERECTOMY  1996    KNEE ARTHROPLASTY Left 9/20/2021    OPEN PATELLOFEMORAL  ARTHROPLASTY WITH ROBOTIC NAVIGATION- LEFT KNEE  - [employer] ([id number]) performed by Nida Rich MD at [facility]    SALPINGO-OOPHORECTOMY      still with left ovary    TOTAL KNEE ARTHROPLASTY Right 11/15/2021    RIGHT KNEE PATELLOFEMORAL ARTHROPLASTY WITH ROBOTIC NAVIGATION - CONCHA AND STEPHEN performed by Martínez Aviles MD at hospitals       Family History   Problem Relation Age of Onset    High Blood Pressure Mother     High Cholesterol Mother     High Blood Pressure Father     High Cholesterol Father     Arthritis Father     Arthritis Paternal Grandmother     Rheum Arthritis Neg Hx     Osteoarthritis Neg Hx     Asthma Neg Hx     Breast Cancer Neg Hx     Cancer Neg Hx     Diabetes Neg Hx     Heart Failure Neg Hx     Hypertension Neg Hx     Migraines Neg Hx     Ovarian Cancer Neg Hx     Rashes/Skin Problems Neg Hx     Seizures Neg Hx     Stroke Neg Hx     Thyroid Disease Neg Hx        Allergies   Allergen Reactions    Lisinopril      Cough       Social History     Tobacco Use    Smoking status: Never Smoker    Smokeless tobacco: Never Used   Vaping Use    Vaping Use: Never used   Substance Use Topics    Alcohol use: No     Alcohol/week: 0.0 standard drinks     Comment: none since 2003    Drug use: No          PHYSICAL EXAMINATION:  Vital Signs: (As obtained by patient/caregiver or practitioner observation)  There were no vitals taken for this visit. Respiratory rate appears normal      Constitutional: Appears well-developed and well-nourished. No apparent distress    Mental status: Alert and awake. Oriented to person/place/cathy. Able to follow commands    Eyes: EOM normal. Sclera normal. No discharge visible  HENT: Normocephalic, atraumatic.    Mouth/Throat: Mucous membranes are moist. External Ears Normal    Neck: No visualized mass   Pulmonary/Chest: Respiratory effort normal.  No visualized signs of difficulty breathing or respiratory distress        Musculoskeletal:  Normal range of motion of neck  Neurological:       No Facial Asymmetry (Cranial nerve 7 motor function) (limited exam

## 2021-12-22 ENCOUNTER — NURSE ONLY (OUTPATIENT)
Dept: FAMILY MEDICINE CLINIC | Age: 61
End: 2021-12-22

## 2021-12-22 DIAGNOSIS — R19.7 DIARRHEA, UNSPECIFIED TYPE: ICD-10-CM

## 2021-12-22 DIAGNOSIS — G44.89 OTHER HEADACHE SYNDROME: ICD-10-CM

## 2021-12-23 LAB — SARS-COV-2: NOT DETECTED

## 2022-01-17 ENCOUNTER — TELEPHONE (OUTPATIENT)
Dept: FAMILY MEDICINE CLINIC | Age: 62
End: 2022-01-17

## 2022-01-17 NOTE — TELEPHONE ENCOUNTER
Pt states she tested positive for having a UTI, and she would like to have a medication sent to per pharmacy. Please advise. Radha Palma

## 2022-01-17 NOTE — TELEPHONE ENCOUNTER
Please get more information  What testing is she referring to? What are her symptoms? How long have they been present?

## 2022-01-21 ENCOUNTER — OFFICE VISIT (OUTPATIENT)
Dept: FAMILY MEDICINE CLINIC | Age: 62
End: 2022-01-21
Payer: COMMERCIAL

## 2022-01-21 VITALS
OXYGEN SATURATION: 99 % | HEIGHT: 70 IN | HEART RATE: 74 BPM | BODY MASS INDEX: 22.9 KG/M2 | DIASTOLIC BLOOD PRESSURE: 60 MMHG | WEIGHT: 160 LBS | SYSTOLIC BLOOD PRESSURE: 110 MMHG

## 2022-01-21 DIAGNOSIS — R39.15 URGENCY OF URINATION: ICD-10-CM

## 2022-01-21 DIAGNOSIS — R35.0 FREQUENCY OF URINATION: Primary | ICD-10-CM

## 2022-01-21 LAB
BILIRUBIN, POC: NORMAL
BLOOD URINE, POC: NORMAL
CLARITY, POC: CLEAR
COLOR, POC: YELLOW
GLUCOSE URINE, POC: NORMAL
KETONES, POC: NORMAL
LEUKOCYTE EST, POC: NORMAL
NITRITE, POC: NORMAL
PH, POC: 7
PROTEIN, POC: NORMAL
SPECIFIC GRAVITY, POC: 1.02
UROBILINOGEN, POC: 0.2

## 2022-01-21 PROCEDURE — 81002 URINALYSIS NONAUTO W/O SCOPE: CPT | Performed by: NURSE PRACTITIONER

## 2022-01-21 PROCEDURE — 99213 OFFICE O/P EST LOW 20 MIN: CPT | Performed by: NURSE PRACTITIONER

## 2022-01-21 RX ORDER — NITROFURANTOIN 25; 75 MG/1; MG/1
100 CAPSULE ORAL 2 TIMES DAILY
Qty: 10 CAPSULE | Refills: 0 | Status: SHIPPED | OUTPATIENT
Start: 2022-01-21 | End: 2022-01-26

## 2022-01-21 ASSESSMENT — PATIENT HEALTH QUESTIONNAIRE - PHQ9
SUM OF ALL RESPONSES TO PHQ QUESTIONS 1-9: 3
3. TROUBLE FALLING OR STAYING ASLEEP: 0
5. POOR APPETITE OR OVEREATING: 0
9. THOUGHTS THAT YOU WOULD BE BETTER OFF DEAD, OR OF HURTING YOURSELF: 0
4. FEELING TIRED OR HAVING LITTLE ENERGY: 3
8. MOVING OR SPEAKING SO SLOWLY THAT OTHER PEOPLE COULD HAVE NOTICED. OR THE OPPOSITE, BEING SO FIGETY OR RESTLESS THAT YOU HAVE BEEN MOVING AROUND A LOT MORE THAN USUAL: 0
SUM OF ALL RESPONSES TO PHQ QUESTIONS 1-9: 3
2. FEELING DOWN, DEPRESSED OR HOPELESS: 0
7. TROUBLE CONCENTRATING ON THINGS, SUCH AS READING THE NEWSPAPER OR WATCHING TELEVISION: 0
1. LITTLE INTEREST OR PLEASURE IN DOING THINGS: 0
SUM OF ALL RESPONSES TO PHQ9 QUESTIONS 1 & 2: 0
10. IF YOU CHECKED OFF ANY PROBLEMS, HOW DIFFICULT HAVE THESE PROBLEMS MADE IT FOR YOU TO DO YOUR WORK, TAKE CARE OF THINGS AT HOME, OR GET ALONG WITH OTHER PEOPLE: 0
6. FEELING BAD ABOUT YOURSELF - OR THAT YOU ARE A FAILURE OR HAVE LET YOURSELF OR YOUR FAMILY DOWN: 0

## 2022-01-21 ASSESSMENT — ENCOUNTER SYMPTOMS
RESPIRATORY NEGATIVE: 1
GASTROINTESTINAL NEGATIVE: 1
BACK PAIN: 0

## 2022-01-21 NOTE — PROGRESS NOTES
2022     Nitza Arroyo (:  1960) is a 62 y.o. female, here for evaluation of the following medical concerns:    Chief Complaint   Patient presents with    Urinary Frequency     and urgency, 2-3 weeks    Urinary Tract Infection     odor     Patient has noticed increase in urinary frequency and an odor to her urine for up to three weeks. She currently is taking Myrbetriq for frequency and feels this has been working well for her. She is concerned she may have a UTI. She denies abdominal pain, pain with urination, vaginal discharge, diarrhea or constipation. She is a patient of Dr. Anai Bran - Urology and he manages her Myrbetriq. Review of Systems   Constitutional: Negative. Respiratory: Negative. Cardiovascular: Negative. Gastrointestinal: Negative. Genitourinary: Positive for frequency and urgency. Negative for difficulty urinating, dysuria, hematuria, pelvic pain and vaginal discharge. Musculoskeletal: Negative for back pain. Neurological: Negative. Prior to Visit Medications    Medication Sig Taking? Authorizing Provider   nitrofurantoin, macrocrystal-monohydrate, (MACROBID) 100 MG capsule Take 1 capsule by mouth 2 times daily for 5 days Yes Cielo LANCE Ford CNP   pregabalin (LYRICA) 150 MG capsule Take 1 capsule by mouth nightly for 90 days.  Yes LANCE Herman CNP   oxyCODONE (ROXICODONE) 5 MG immediate release tablet  Yes Historical Provider, MD   acetaminophen (APAP EXTRA STRENGTH) 500 MG tablet Take 2 tablets by mouth every 6 hours as needed for Pain Yes Cisco Dang MD   aspirin EC 81 MG EC tablet Take 1 tablet by mouth 2 times daily Yes Cisco Dang MD   ibuprofen (ADVIL;MOTRIN) 800 MG tablet Take 1 tablet by mouth every 6 hours as needed for Pain Yes Cisco Dang MD   MAGNESIUM-POTASSIUM PO Take by mouth daily Yes Historical Provider, MD   Calcium Carbonate-Vit D-Min (CALCIUM 1200 PO) Take by mouth daily Yes Historical Provider, MD   Multiple Vitamins-Minerals (THERAPEUTIC MULTIVITAMIN-MINERALS) tablet Take 1 tablet by mouth daily Yes Historical Provider, MD   MYRBETRIQ 50 MG TB24 TAKE 1 TABLET BY MOUTH EVERY DAY Yes Historical Provider, MD   buPROPion (WELLBUTRIN XL) 300 MG extended release tablet Take 1 tablet by mouth every morning Yes LANCE Edward CNP   valsartan (DIOVAN) 80 MG tablet Take 1 tablet by mouth daily Yes LANCE Edward CNP   traZODone (DESYREL) 100 MG tablet TAKE 2 TABLETS BY MOUTH EVERY NIGHT  Patient taking differently: Only taking 1 tablet day Yes LANCE Edward CNP   Estradiol (YUVAFEM) 10 MCG TABS vaginal tablet Place 1 tablet vaginally Twice a Week Yes Simeon Chris MD   esomeprazole ([address]) 40 MG delayed release capsule Take 1 capsule by mouth every morning (before breakfast) Yes LANCE Edward CNP   Omega-3 Fatty Acids (FISH OIL PO) Take by mouth Yes Historical Provider, MD   Cholecalciferol (VITAMIN D PO) Take by mouth Yes Historical Provider, MD        Social History     Tobacco Use    Smoking status: Never Smoker    Smokeless tobacco: Never Used   Vaping Use    Vaping Use: Never used   Substance Use Topics    Alcohol use: No     Alcohol/week: 0.0 standard drinks     Comment: none since 2003    Drug use: No        Vitals:    01/21/22 0824   BP: 110/60   Pulse: 74   SpO2: 99%   Weight: 160 lb (72.6 kg)   Height: 5' 10\" (1.778 m)     Estimated body mass index is 22.96 kg/m² as calculated from the following:    Height as of this encounter: 5' 10\" (1.778 m). Weight as of this encounter: 160 lb (72.6 kg). Physical Exam  Vitals and nursing note reviewed. Constitutional:       General: She is not in acute distress. Appearance: Normal appearance. She is normal weight. She is not ill-appearing. Cardiovascular:      Rate and Rhythm: Normal rate and regular rhythm. Heart sounds: Normal heart sounds, S1 normal and S2 normal. No murmur heard.       Pulmonary: Effort: Pulmonary effort is normal.      Breath sounds: Normal breath sounds and air entry. Abdominal:      General: Abdomen is flat. Bowel sounds are normal.   Skin:     General: Skin is warm and dry. Capillary Refill: Capillary refill takes less than 2 seconds. Neurological:      General: No focal deficit present. Mental Status: She is alert. Psychiatric:         Mood and Affect: Mood normal.     ASSESSMENT/PLAN:  1. Frequency of urination  Urine appears negative, with pending weekend, will treat with Macrobid, culture ordered. If negative will stop macrobid and have patient follow up with Dr. Ruben Perez.  - POCT Urinalysis no Micro  - nitrofurantoin, macrocrystal-monohydrate, (MACROBID) 100 MG capsule; Take 1 capsule by mouth 2 times daily for 5 days  Dispense: 10 capsule; Refill: 0  - Culture, Urine    2. Urgency of urination  - POCT Urinalysis no Micro  - nitrofurantoin, macrocrystal-monohydrate, (MACROBID) 100 MG capsule; Take 1 capsule by mouth 2 times daily for 5 days  Dispense: 10 capsule; Refill: 0  - Culture, Urine    Return if symptoms worsen or fail to improve.

## 2022-01-23 LAB — URINE CULTURE, ROUTINE: NORMAL

## 2022-01-24 ENCOUNTER — OFFICE VISIT (OUTPATIENT)
Dept: GYNECOLOGY | Age: 62
End: 2022-01-24
Payer: COMMERCIAL

## 2022-01-24 VITALS
OXYGEN SATURATION: 95 % | HEART RATE: 70 BPM | WEIGHT: 159.6 LBS | HEIGHT: 70 IN | DIASTOLIC BLOOD PRESSURE: 74 MMHG | SYSTOLIC BLOOD PRESSURE: 112 MMHG | RESPIRATION RATE: 17 BRPM | BODY MASS INDEX: 22.85 KG/M2

## 2022-01-24 DIAGNOSIS — Z01.419 WELL WOMAN EXAM WITH ROUTINE GYNECOLOGICAL EXAM: Primary | ICD-10-CM

## 2022-01-24 DIAGNOSIS — N94.10 DYSPAREUNIA, FEMALE: ICD-10-CM

## 2022-01-24 DIAGNOSIS — Z78.0 MENOPAUSE: ICD-10-CM

## 2022-01-24 PROCEDURE — 99396 PREV VISIT EST AGE 40-64: CPT | Performed by: OBSTETRICS & GYNECOLOGY

## 2022-01-24 RX ORDER — ESTRADIOL 10 UG/1
10 INSERT VAGINAL
Qty: 24 TABLET | Refills: 3 | Status: SHIPPED | OUTPATIENT
Start: 2022-01-24

## 2022-01-24 RX ORDER — CYCLOBENZAPRINE HCL 10 MG
10 TABLET ORAL NIGHTLY PRN
Qty: 30 TABLET | Refills: 3 | Status: SHIPPED | OUTPATIENT
Start: 2022-01-24 | End: 2022-02-23

## 2022-01-24 ASSESSMENT — ENCOUNTER SYMPTOMS
GASTROINTESTINAL NEGATIVE: 1
EYES NEGATIVE: 1
RESPIRATORY NEGATIVE: 1

## 2022-01-25 NOTE — PROGRESS NOTES
Subjective:      Patient ID: To Mckay is a 62 y.o. female. Patient is here for annual. Patient in menopause. Dx with cardiomyopathy this summer. Patient with recent bilateral knee pain. Gynecologic Exam  Associated symptoms include arthralgias. Review of Systems   Constitutional: Negative. HENT: Negative. Eyes: Negative. Respiratory: Negative. Cardiovascular: Negative. Gastrointestinal: Negative. Genitourinary: Positive for vaginal pain. Musculoskeletal: Positive for arthralgias. Skin: Negative. Neurological: Negative. Psychiatric/Behavioral: Negative.       Date of Birth 1960  Past Medical History:   Diagnosis Date    Alcoholism [facility])     Recovering    Cardiomyopathy [facility])     2021    Chronic left-sided low back pain without sciatica 05/26/2020    Degenerative joint disease     Depression     Fibrocystic disease of breast 06/15/2010    GERD (gastroesophageal reflux disease)     Headache(784.0)     Hemorrhoid 06/15/2010    Hyperlipidemia 06/25/2019    Mitral valve regurgitation     Osteoporosis     Screening mammogram, encounter for 02/04/2018    Dr Aroldo Jameson MD    Urinary incontinence     Vitamin D deficiency 03/16/2012     Past Surgical History:   Procedure Laterality Date    APPENDECTOMY  01/13/2021    BLADDER SURGERY      with hysterectomy-vaginal approach    BREAST SURGERY  2006    benign cyst on left    CARDIAC CATHETERIZATION  01/29/2021    COLONOSCOPY  11/30/2012    Dr Sandy Conte      facial 12.11    FACIAL COSMETIC SURGERY      HYSTERECTOMY  1996    KNEE ARTHROPLASTY Left 9/20/2021    OPEN PATELLOFEMORAL  ARTHROPLASTY WITH ROBOTIC NAVIGATION- LEFT KNEE  - [facility] ([id number]) performed by Caitlyn Law MD at [address] [place] [address] Stret SALPINGO-OOPHORECTOMY      still with left ovary    TOTAL KNEE ARTHROPLASTY Right 11/15/2021    RIGHT KNEE PATELLOFEMORAL ARTHROPLASTY WITH ROBOTIC NAVIGATION [facility] NEPHEW performed by Billy Baptiste MD at [facility]     OB History    Para Term  AB Living   2 2 2     2   SAB IAB Ectopic Molar Multiple Live Births             2      # Outcome Date GA Lbr Frantz/2nd Weight Sex Delivery Anes PTL Lv   2 Term 01/10/95 40w0d   M Vag-Spont   YEIMI   1 Term 90 40w0d   M Vag-Spont   YEIMI     Social History     Socioeconomic History    Marital status:      Spouse name: Not on file    Number of children: Not on file    Years of education: Not on file    Highest education level: Not on file   Occupational History    Not on file   Tobacco Use    Smoking status: Never Smoker    Smokeless tobacco: Never Used   Vaping Use    Vaping Use: Never used   Substance and Sexual Activity    Alcohol use: No     Alcohol/week: 0.0 standard drinks     Comment: none since     Drug use: No    Sexual activity: Yes     Partners: Male   Other Topics Concern    Not on file   Social History Narrative    Not on file     Social Determinants of Health     Financial Resource Strain: Low Risk     Difficulty of Paying Living Expenses: Not hard at all   Food Insecurity: No Food Insecurity    Worried About [address] in the Last Year: Never true    [facility] in the Last Year: Never true   Transportation Needs:     Lack of Transportation (Medical): Not on file    Lack of Transportation (Non-Medical):  Not on file   Physical Activity:     Days of Exercise per Week: Not on file    Minutes of Exercise per Session: Not on file   Stress:     Feeling of Stress : Not on file   Social Connections:     Frequency of Communication with Friends and Family: Not on file    Frequency of Social Gatherings with Friends and Family: Not on file    Attends Catholic Services: Not on file    Active Member of Clubs or Organizations: Not on file    Attends Club or Organization Meetings: Not on file    Marital Status: Not on file   Intimate Partner Violence:     Fear of delayed release capsule Take 1 capsule by mouth every morning (before breakfast) 90 capsule 1    Omega-3 Fatty Acids (FISH OIL PO) Take by mouth      Cholecalciferol (VITAMIN D PO) Take by mouth       Family History   Problem Relation Age of Onset    High Blood Pressure Mother     High Cholesterol Mother     High Blood Pressure Father     High Cholesterol Father     Arthritis Father     Arthritis Paternal Grandmother     Rheum Arthritis Neg Hx     Osteoarthritis Neg Hx     Asthma Neg Hx     Breast Cancer Neg Hx     Cancer Neg Hx     Diabetes Neg Hx     Heart Failure Neg Hx     Hypertension Neg Hx     Migraines Neg Hx     Ovarian Cancer Neg Hx     Rashes/Skin Problems Neg Hx     Seizures Neg Hx     Stroke Neg Hx     Thyroid Disease Neg Hx      /74 (Site: Left Upper Arm, Position: Sitting, Cuff Size: Medium Adult)   Pulse 70   Resp 17   Ht 5' 10\" (1.778 m)   Wt 159 lb 9.6 oz (72.4 kg)   SpO2 95%   BMI 22.90 kg/m²       Objective:   Physical Exam  Constitutional:       General: She is not in acute distress. Appearance: Normal appearance. She is well-developed and normal weight. She is not diaphoretic. HENT:      Head: Normocephalic. Nose: Nose normal.      Mouth/Throat:      Mouth: Mucous membranes are moist.      Pharynx: Oropharynx is clear. Eyes:      Pupils: Pupils are equal, round, and reactive to light. Neck:      Thyroid: No thyromegaly. Cardiovascular:      Rate and Rhythm: Normal rate and regular rhythm. Heart sounds: Normal heart sounds. No murmur heard. No friction rub. No gallop. Pulmonary:      Effort: Pulmonary effort is normal. No respiratory distress. Breath sounds: Normal breath sounds. No wheezing or rales. Chest:      Chest wall: No tenderness. Breasts:      Right: No swelling, bleeding, inverted nipple, mass, nipple discharge, skin change or tenderness.       Left: No swelling, bleeding, inverted nipple, mass, nipple discharge, skin change or tenderness. Abdominal:      General: Abdomen is flat. There is no distension. Palpations: Abdomen is soft. There is no hepatomegaly or mass. Tenderness: There is no abdominal tenderness. There is no guarding or rebound. Hernia: No hernia is present. There is no hernia in the left inguinal area. Genitourinary:     Exam position: Lithotomy position. Labia:         Right: No rash, tenderness, lesion or injury. Left: No rash, tenderness, lesion or injury. Urethra: No prolapse, urethral pain, urethral swelling or urethral lesion. Vagina: Normal. No signs of injury and foreign body. No vaginal discharge, erythema, tenderness, bleeding or lesions. Adnexa: Right adnexa normal and left adnexa normal.        Right: No mass, tenderness or fullness. Left: No mass, tenderness or fullness. Rectum: Normal. Guaiac result negative. No mass, tenderness, anal fissure, external hemorrhoid or internal hemorrhoid. Normal anal tone. Comments: Normal urethral meatus, nl urethra, nl bladder. Vaginal atrophy  Musculoskeletal:         General: No tenderness. Normal range of motion. Cervical back: Normal range of motion and neck supple. No rigidity. Lymphadenopathy:      Cervical: No cervical adenopathy. Skin:     General: Skin is warm and dry. Neurological:      General: No focal deficit present. Mental Status: She is alert and oriented to person, place, and time. Mental status is at baseline. Deep Tendon Reflexes: Reflexes are normal and symmetric. Psychiatric:         Mood and Affect: Mood normal.         Behavior: Behavior normal.         Thought Content: Thought content normal.         Judgment: Judgment normal.         Assessment:      1. Annual  2. Menopause  3. Vaginal atrophy/dyspareuni      Plan:      1.  Pap, calcium, exercise, mammogram, hemocult negative  2 and 3. vagifem and flexeril        Carmita Wong MD

## 2022-03-01 DIAGNOSIS — M25.561 CHRONIC PAIN OF RIGHT KNEE: ICD-10-CM

## 2022-03-01 DIAGNOSIS — G89.29 CHRONIC PAIN OF RIGHT KNEE: ICD-10-CM

## 2022-03-01 RX ORDER — PREGABALIN 150 MG/1
150 CAPSULE ORAL NIGHTLY
Qty: 90 CAPSULE | Refills: 1 | Status: SHIPPED | OUTPATIENT
Start: 2022-03-01 | End: 2022-09-06

## 2022-03-01 NOTE — TELEPHONE ENCOUNTER
Medication and Quantity requested:  pregabalin (LYRICA) 150 MG capsule        Last Visit 01/21/2022      Pharmacy and phone number updated in EPIC:  Yes    Patient called and stated that she need to  her medication  Early. . she will be leaving Saturday morning in the 5th.  Please advise

## 2022-03-03 ENCOUNTER — TELEPHONE (OUTPATIENT)
Dept: FAMILY MEDICINE CLINIC | Age: 62
End: 2022-03-03

## 2022-03-03 NOTE — TELEPHONE ENCOUNTER
pregabalin (LYRICA) 150 MG capsule      Pt is going out of town on Saturday 3/5 and needs to  medication early.   (SEE NOTE ON ORIGINAL REQUEST)    CONTACT [facility]

## 2022-04-28 DIAGNOSIS — F32.0 CURRENT MILD EPISODE OF MAJOR DEPRESSIVE DISORDER WITHOUT PRIOR EPISODE (HCC): ICD-10-CM

## 2022-04-29 RX ORDER — BUPROPION HYDROCHLORIDE 300 MG/1
300 TABLET ORAL EVERY MORNING
Qty: 90 TABLET | Refills: 1 | Status: SHIPPED | OUTPATIENT
Start: 2022-04-29 | End: 2022-10-31

## 2022-04-29 NOTE — TELEPHONE ENCOUNTER
Medication:   Requested Prescriptions     Pending Prescriptions Disp Refills    buPROPion (WELLBUTRIN XL) 300 MG extended release tablet [Pharmacy Med Name: BUPROPION HCL  MG TABLET] 90 tablet 1     Sig: TAKE 1 TABLET BY MOUTH EVERY MORNING        Last Filled:  6/14/2021, 90, 1    Patient Phone Number: [phone] (home) [phone] (work)    Last appt: 1/21/2022 UTI symptoms, Lurlene Shelling  Next appt:     Last OARRS:   RX Monitoring 11/15/2018   Attestation The Prescription Monitoring Report for this patient was reviewed today. Periodic Controlled Substance Monitoring Possible medication side effects, risk of tolerance/dependence & alternative treatments discussed. ;No signs of potential drug abuse or diversion identified.

## 2022-05-26 ENCOUNTER — TELEMEDICINE (OUTPATIENT)
Dept: FAMILY MEDICINE CLINIC | Age: 62
End: 2022-05-26
Payer: COMMERCIAL

## 2022-05-26 DIAGNOSIS — R63.5 WEIGHT GAIN: ICD-10-CM

## 2022-05-26 DIAGNOSIS — K21.9 GASTROESOPHAGEAL REFLUX DISEASE, UNSPECIFIED WHETHER ESOPHAGITIS PRESENT: Primary | ICD-10-CM

## 2022-05-26 PROCEDURE — 99213 OFFICE O/P EST LOW 20 MIN: CPT | Performed by: NURSE PRACTITIONER

## 2022-05-26 RX ORDER — DEXLANSOPRAZOLE 60 MG/1
60 CAPSULE, DELAYED RELEASE ORAL DAILY
Qty: 30 CAPSULE | Refills: 0 | Status: SHIPPED | OUTPATIENT
Start: 2022-05-26 | End: 2022-06-24

## 2022-05-26 RX ORDER — CARVEDILOL 6.25 MG/1
TABLET ORAL
COMMUNITY
Start: 2022-05-20

## 2022-05-26 ASSESSMENT — PATIENT HEALTH QUESTIONNAIRE - PHQ9
6. FEELING BAD ABOUT YOURSELF - OR THAT YOU ARE A FAILURE OR HAVE LET YOURSELF OR YOUR FAMILY DOWN: 0
1. LITTLE INTEREST OR PLEASURE IN DOING THINGS: 0
3. TROUBLE FALLING OR STAYING ASLEEP: 0
SUM OF ALL RESPONSES TO PHQ QUESTIONS 1-9: 0
10. IF YOU CHECKED OFF ANY PROBLEMS, HOW DIFFICULT HAVE THESE PROBLEMS MADE IT FOR YOU TO DO YOUR WORK, TAKE CARE OF THINGS AT HOME, OR GET ALONG WITH OTHER PEOPLE: 0
5. POOR APPETITE OR OVEREATING: 0
9. THOUGHTS THAT YOU WOULD BE BETTER OFF DEAD, OR OF HURTING YOURSELF: 0
SUM OF ALL RESPONSES TO PHQ QUESTIONS 1-9: 0
SUM OF ALL RESPONSES TO PHQ9 QUESTIONS 1 & 2: 0
8. MOVING OR SPEAKING SO SLOWLY THAT OTHER PEOPLE COULD HAVE NOTICED. OR THE OPPOSITE, BEING SO FIGETY OR RESTLESS THAT YOU HAVE BEEN MOVING AROUND A LOT MORE THAN USUAL: 0
4. FEELING TIRED OR HAVING LITTLE ENERGY: 0
SUM OF ALL RESPONSES TO PHQ QUESTIONS 1-9: 0
7. TROUBLE CONCENTRATING ON THINGS, SUCH AS READING THE NEWSPAPER OR WATCHING TELEVISION: 0
2. FEELING DOWN, DEPRESSED OR HOPELESS: 0
SUM OF ALL RESPONSES TO PHQ QUESTIONS 1-9: 0

## 2022-05-26 ASSESSMENT — ENCOUNTER SYMPTOMS
NAUSEA: 0
CONSTIPATION: 0
ABDOMINAL PAIN: 1
VOMITING: 0
SHORTNESS OF BREATH: 0
DIARRHEA: 0

## 2022-05-26 NOTE — PROGRESS NOTES
2022      TELEHEALTH EVALUATION -- Audio/Visual (During [facility]-96 public health emergency)    HPI:    Roseline Lesches (:  1960) has requested an audio/video evaluation for the following concern(s):    Heartburn for past couple weeks. Associated with belching- which helps symptoms. Taking OTC Nexium 20 mg , 2 tabs once daily. Denies CP, SOB, palpitations, or vomiting. Has eliminated soda and coffee. Has gained 10 lbs recently. Per pt- weight gain usually leads to heartburn. Wanting referral to nutritionist to help with healthy weight loss. Review of Systems   Constitutional: Negative for chills and fever. Respiratory: Negative for shortness of breath. Cardiovascular: Negative for chest pain and palpitations. Gastrointestinal: Positive for abdominal pain (epigastric). Negative for constipation, diarrhea, nausea and vomiting. Heartburn  Belching          Prior to Visit Medications    Medication Sig Taking? Authorizing Provider   carvedilol (COREG) 6.25 MG tablet TAKE 1 TABLET BY MOUTH 2 TIMES DAILY (WITH MEALS) Yes Historical Provider, MD   dexlansoprazole (DEXILANT) 60 MG CPDR delayed release capsule Take 1 capsule by mouth daily Yes LANCE Urbano - CNP   buPROPion (WELLBUTRIN XL) 300 MG extended release tablet TAKE 1 TABLET BY MOUTH EVERY MORNING Yes LANCE Rodriguez CNP   pregabalin (LYRICA) 150 MG capsule Take 1 capsule by mouth nightly for 180 days.  Yes LANCE Urbano - CNP   Estradiol (YUVAFEM) 10 MCG TABS vaginal tablet Place 1 tablet vaginally Twice a Week Yes Delvin De Leon MD   oxyCODONE (ROXICODONE) 5 MG immediate release tablet  Yes Historical Provider, MD   acetaminophen (APAP EXTRA STRENGTH) 500 MG tablet Take 2 tablets by mouth every 6 hours as needed for Pain Yes Jairo Cuevas MD   aspirin EC 81 MG EC tablet Take 1 tablet by mouth 2 times daily Yes Jairo Cuevas MD   ibuprofen (ADVIL;MOTRIN) 800 MG tablet Take 1 tablet by mouth every 6 hours as needed for Pain Yes Joshua Zambrano MD   MAGNESIUM-POTASSIUM PO Take by mouth daily Yes Historical Provider, MD   Calcium Carbonate-Vit D-Min (CALCIUM 1200 PO) Take by mouth daily Yes Historical Provider, MD   Multiple Vitamins-Minerals (THERAPEUTIC MULTIVITAMIN-MINERALS) tablet Take 1 tablet by mouth daily Yes Historical Provider, MD   MYRBETRIQ 50 MG TB24 TAKE 1 TABLET BY MOUTH EVERY DAY Yes Historical Provider, MD   valsartan (DIOVAN) 80 MG tablet Take 1 tablet by mouth daily Yes LANCE Macdonald CNP   traZODone (DESYREL) 100 MG tablet TAKE 2 TABLETS BY MOUTH EVERY NIGHT  Patient taking differently:  Only taking 1 tablet day Yes LANCE Macdonald CNP   Omega-3 Fatty Acids (FISH OIL PO) Take by mouth Yes Historical Provider, MD   Cholecalciferol (VITAMIN D PO) Take by mouth Yes Historical Provider, MD       Past Medical History:   Diagnosis Date    Alcoholism ([facility]     Recovering    Cardiomyopathy ([facility]     2021    Chronic left-sided low back pain without sciatica 05/26/2020    Degenerative joint disease     Depression     Fibrocystic disease of breast 06/15/2010    GERD (gastroesophageal reflux disease)     Headache(784.0)     Hemorrhoid 06/15/2010    Hyperlipidemia 06/25/2019    Mitral valve regurgitation     Osteoporosis     Screening mammogram, encounter for 02/04/2018    Dr Wesley Cox MD    Urinary incontinence     Vitamin D deficiency 03/16/2012       Past Surgical History:   Procedure Laterality Date    APPENDECTOMY  01/13/2021    BLADDER SURGERY      with hysterectomy-vaginal approach    BREAST SURGERY  2006    benign cyst on left    CARDIAC CATHETERIZATION  01/29/2021    COLONOSCOPY  11/30/2012    Dr Lily Winkler      facial 12.11    FACIAL COSMETIC SURGERY      HYSTERECTOMY  1996    KNEE ARTHROPLASTY Left 9/20/2021    OPEN PATELLOFEMORAL  ARTHROPLASTY WITH ROBOTIC NAVIGATION- LEFT KNEE  - [facility] ([id number]) performed by William Ramirez Tavares Copeland MD at [address] [address] SALPINGO-OOPHORECTOMY      still with left ovary    TOTAL KNEE ARTHROPLASTY Right 11/15/2021    RIGHT KNEE PATELLOFEMORAL ARTHROPLASTY WITH ROBOTIC NAVIGATION - [facility] performed by oDt Banerjee MD at [facility]       Family History   Problem Relation Age of Onset    High Blood Pressure Mother     High Cholesterol Mother     High Blood Pressure Father     High Cholesterol Father     Arthritis Father     Arthritis Paternal Grandmother     Rheum Arthritis Neg Hx     Osteoarthritis Neg Hx     Asthma Neg Hx     Breast Cancer Neg Hx     Cancer Neg Hx     Diabetes Neg Hx     Heart Failure Neg Hx     Hypertension Neg Hx     Migraines Neg Hx     Ovarian Cancer Neg Hx     Rashes/Skin Problems Neg Hx     Seizures Neg Hx     Stroke Neg Hx     Thyroid Disease Neg Hx        Allergies   Allergen Reactions    Lisinopril      Cough       Social History     Tobacco Use    Smoking status: Never Smoker    Smokeless tobacco: Never Used   Vaping Use    Vaping Use: Never used   Substance Use Topics    Alcohol use: No     Alcohol/week: 0.0 standard drinks     Comment: none since 2003    Drug use: No          PHYSICAL EXAMINATION:  Vital Signs: (As obtained by patient/caregiver or practitioner observation)  There were no vitals taken for this visit. Respiratory rate appears normal      Constitutional: Appears well-developed and well-nourished. No apparent distress    Mental status: Alert and awake. Oriented to person/place/cathy. Able to follow commands    Eyes: EOM normal. Sclera normal. No discharge visible  HENT: Normocephalic, atraumatic.    Mouth/Throat: Mucous membranes are moist. External Ears Normal    Neck: No visualized mass   Pulmonary/Chest: Respiratory effort normal.  No visualized signs of difficulty breathing or respiratory distress        Musculoskeletal:  Normal range of motion of neck  Neurological:       No Facial Asymmetry (Cranial nerve 7 motor function) (limited exam to video visit). No gaze palsy       Skin:  No significant exanthematous lesions or discoloration noted on facial skin       Psychiatric: Normal Affect. No Hallucinations            ASSESSMENT/PLAN:  1. Gastroesophageal reflux disease, unspecified whether esophagitis present  Uncontrolled  Stop Nexium and trial   - dexlansoprazole (DEXILANT) 60 MG CPDR delayed release capsule; Take 1 capsule by mouth daily  Dispense: 30 capsule; Refill: 0  After 30 days, decrease t 30 mg daily  Continue healthy diet    2. Weight gain  Francie Craig [facility] Nutritionist  [facility]  [phone]      Return in about 6 weeks (around 7/7/2022). Ying Person is a 65 y.o. female being evaluated by a Virtual Visit (video visit) encounter to address concerns as mentioned above. A caregiver was present when appropriate. Due to this being a TeleHealth encounter (During [facility] public health emergency), evaluation of the following organ systems was limited: Vitals/Constitutional/EENT/Resp/CV/GI//MS/Neuro/Skin/Heme-Lymph-Imm. Pursuant to the emergency declaration under the [address] [place] [address], [address] [place] [address] authority and the [employer] Act, this Virtual Visit was conducted with patient's (and/or legal guardian's) consent, to reduce the patient's risk of exposure to COVID-19 and provide necessary medical care. The patient (and/or legal guardian) has also been advised to contact this office for worsening conditions or problems, and seek emergency medical treatment and/or call 911 if deemed necessary. Patient identification was verified at the start of the visit: Yes    Total time spent on this encounter: Not billed by time minutes    Services were provided through a video synchronous discussion virtually to substitute for in-person clinic visit.  Patient and provider were located at their individual [facility].   The patient (or guardian if applicable) is aware that this is a billable service, which includes applicable co-pays. This Virtual Visit was conducted with patient's (and/or legal guardian's) consent. The visit was conducted pursuant to the emergency declaration under the [address] [place] [address], [address] [place] [address] authority and the [employer] [facility] [employer] Act. Patient identification was verified, and a caregiver was present when appropriate. The patient was located in a state where the provider was licensed to provide care. --LANCE Venegas CNP on 5/26/2022 at 4:15 PM    An electronic signature was used to authenticate this note. Hanh oHllis

## 2022-05-26 NOTE — PATIENT INSTRUCTIONS
Patient Education        Gastroesophageal Reflux Disease (GERD): Care Instructions  Overview     Gastroesophageal reflux disease (GERD) is the backward flow of stomach acid into the esophagus. The esophagus is the tube that leads from your throat to your stomach. A one-way valve prevents the stomach acid from backing up into this tube. But when you have GERD, this valve does not close tightly enough. This can also cause pain and swelling in your esophagus. (This is calledesophagitis.)  If you have mild GERD symptoms including heartburn, you may be able to control the problem with antacids or over-the-counter medicine. You can also make lifestyle changes to help reduce your symptoms. These include changing yourdiet and eating habits, such as not eating late at night and losing weight. Follow-up care is a key part of your treatment and safety. Be sure to make and go to all appointments, and call your doctor if you are having problems. It's also a good idea to know your test results and keep alist of the medicines you take. How can you care for yourself at home?  Take your medicines exactly as prescribed. Call your doctor if you think you are having a problem with your medicine.  Your doctor may recommend over-the-counter medicine. For mild or occasional indigestion, antacids, such as Tums, Gaviscon, Mylanta, or Maalox, may help. Your doctor also may recommend over-the-counter acid reducers, such as famotidine (Pepcid AC), cimetidine (Tagamet HB), or omeprazole (Prilosec). Read and follow all instructions on the label. If you use these medicines often, talk with your doctor.  Change your eating habits. ? It's best to eat several small meals instead of two or three large meals. ? After you eat, wait 2 to 3 hours before you lie down. ? Avoid foods that make your symptoms worse.  These may include chocolate, mint, alcohol, pepper, spicy foods, high-fat foods, or drinks with caffeine in them, such as tea, coffee, alvin, or energy drinks. If your symptoms are worse after you eat a certain food, you may want to stop eating it to see if your symptoms get better.  Do not smoke or chew tobacco. Smoking can make GERD worse. If you need help quitting, talk to your doctor about stop-smoking programs and medicines. These can increase your chances of quitting for good.  If you have GERD symptoms at night, raise the head of your bed 6 to 8 inches by putting the frame on blocks or placing a foam wedge under the head of your mattress. (Adding extra pillows does not work.)   Do not wear tight clothing around your middle.  Lose weight if you need to. Losing just 5 to 10 pounds can help. When should you call for help? Call your doctor now or seek immediate medical care if:     You have new or different belly pain.      Your stools are black and tarlike or have streaks of blood. Watch closely for changes in your health, and be sure to contact your doctor if:     Your symptoms have not improved after 2 days.      Food seems to catch in your throat or chest.   Where can you learn more? Go to [email or web address]://[email or web address] [email or web address].lt. org and sign in to your e[employer] account. Enter G257 in the [facility] box to learn more about \"Gastroesophageal Reflux Disease (GERD): Care Instructions. \"     If you do not have an account, please click on the \"Sign Up Now\" link. Current as of: September 8, 2021               Content Version: 13.2  © 3134-3143 Healthwise, [facility]. Care instructions adapted under license by [facility] ([facility]). If you have questions about a medical condition or this instruction, always ask your healthcare professional. [place] [address] any warranty or liability for your use of this information.

## 2022-05-27 ENCOUNTER — TELEPHONE (OUTPATIENT)
Dept: FAMILY MEDICINE CLINIC | Age: 62
End: 2022-05-27

## 2022-05-27 NOTE — TELEPHONE ENCOUNTER
Office has been notified that pt is requiring Prior Authorization for the following medication:  -- dexlansoprazole ([employer]) 60 MG CPDR delayed release capsule    Please initiate this request through CoverMyMeds, contacting the following Payor/Insurance:  --  BCBS    Please see below, or the documentation attached to this encounter for any additional information that may assist in processing PA:  -- Diagnosis Association: Gastroesophageal reflux disease, unspecified whether esophagitis present (K21.9)    Thank you!

## 2022-05-31 NOTE — TELEPHONE ENCOUNTER
Submitted PA for Dexlansoprazole 60MG dr capsules  [facility] CAROLIN  Key: [id number] STATUS: APPROVED. If this requires a response please respond to the pool. [address]). Please advise patient thank you.

## 2022-06-24 DIAGNOSIS — K21.9 GASTROESOPHAGEAL REFLUX DISEASE, UNSPECIFIED WHETHER ESOPHAGITIS PRESENT: ICD-10-CM

## 2022-06-24 RX ORDER — DEXLANSOPRAZOLE 30 MG/1
30 CAPSULE, DELAYED RELEASE ORAL DAILY
Qty: 90 CAPSULE | Refills: 3 | Status: SHIPPED | OUTPATIENT
Start: 2022-06-24

## 2022-06-24 NOTE — TELEPHONE ENCOUNTER
Medication:   Requested Prescriptions     Pending Prescriptions Disp Refills    dexlansoprazole (DEXILANT) 60 MG CPDR delayed release capsule [Pharmacy Med Name: DEXLANSOPRAZOLE DR 60 MG CAP] 30 capsule 0     Sig: TAKE 1 CAPSULE BY MOUTH EVERY DAY     Last Filled:  5/26/22    Last appt: 5/26/2022   Next appt: Visit date not found    Last Lipid:   Lab Results   Component Value Date    CHOL 201 10/16/2015    TRIG 57 10/16/2015    HDL 77 04/18/2019    HDL 56 03/13/2012    LDLCALC 132 04/18/2019

## 2022-06-25 DIAGNOSIS — S39.012A STRAIN OF MUSCLE, FASCIA AND TENDON OF LOWER BACK, INITIAL ENCOUNTER: ICD-10-CM

## 2022-08-05 RX ORDER — CYCLOBENZAPRINE HCL 10 MG
10 TABLET ORAL NIGHTLY PRN
Qty: 30 TABLET | Refills: 3 | OUTPATIENT
Start: 2022-08-05 | End: 2022-09-04

## 2022-08-10 NOTE — TELEPHONE ENCOUNTER
Medication:   Requested Prescriptions     Pending Prescriptions Disp Refills    MYRBETRIQ 50 MG TB24 [Pharmacy Med Name: Deepak Horowitz ER 50 MG TABLET] 30 tablet 1     Sig: TAKE 1 TABLET BY MOUTH EVERY DAY        Last Filled:  8/9/2021    Patient Phone Number: [phone] (home) [phone] (work)    Last appt: 5/26/2022   Next appt: Visit date not found    Last OARRS:   RX Monitoring 11/15/2018   Attestation The Prescription Monitoring Report for this patient was reviewed today. Periodic Controlled Substance Monitoring Possible medication side effects, risk of tolerance/dependence & alternative treatments discussed. ;No signs of potential drug abuse or diversion identified.

## 2022-08-17 DIAGNOSIS — F32.0 CURRENT MILD EPISODE OF MAJOR DEPRESSIVE DISORDER WITHOUT PRIOR EPISODE (HCC): ICD-10-CM

## 2022-08-17 RX ORDER — TRAZODONE HYDROCHLORIDE 100 MG/1
TABLET ORAL
Qty: 180 TABLET | Refills: 2 | Status: SHIPPED | OUTPATIENT
Start: 2022-08-17

## 2022-08-17 NOTE — TELEPHONE ENCOUNTER
Medication and Quantity requested: traZODone (DESYREL) 100 MG tablet       Last Visit  5/26/2022    Pharmacy and phone number updated in EPIC:  yes

## 2022-08-17 NOTE — TELEPHONE ENCOUNTER
Medication:   Requested Prescriptions     Pending Prescriptions Disp Refills    traZODone (DESYREL) 100 MG tablet 180 tablet 2        Last Filled: 1/11/2021     Patient Phone Number: [phone] (home) [phone] (work)    Last appt: 5/26/2022   Next appt: Visit date not found    Last OARRS:   RX Monitoring 11/15/2018   Attestation The Prescription Monitoring Report for this patient was reviewed today. Periodic Controlled Substance Monitoring Possible medication side effects, risk of tolerance/dependence & alternative treatments discussed. ;No signs of potential drug abuse or diversion identified.

## 2022-09-02 DIAGNOSIS — M25.561 CHRONIC PAIN OF RIGHT KNEE: ICD-10-CM

## 2022-09-02 DIAGNOSIS — G89.29 CHRONIC PAIN OF RIGHT KNEE: ICD-10-CM

## 2022-09-02 NOTE — TELEPHONE ENCOUNTER
Medication:   Requested Prescriptions     Pending Prescriptions Disp Refills    pregabalin (LYRICA) 150 MG capsule [Pharmacy Med Name: PREGABALIN 150 MG CAPSULE] 90 capsule      Sig: TAKE 1 CAPSULE BY MOUTH NIGHTLY  DAYS. Last Filled:  3/1/2022, 90, 1    Patient Phone Number: [phone] (home) [phone] (work)    Last appt: 5/26/2022   Next appt: Visit date not found    Last OARRS:   RX Monitoring 11/15/2018   Attestation The Prescription Monitoring Report for this patient was reviewed today. Periodic Controlled Substance Monitoring Possible medication side effects, risk of tolerance/dependence & alternative treatments discussed. ;No signs of potential drug abuse or diversion identified.

## 2022-09-06 RX ORDER — PREGABALIN 150 MG/1
150 CAPSULE ORAL NIGHTLY
Qty: 90 CAPSULE | Refills: 1 | Status: SHIPPED | OUTPATIENT
Start: 2022-09-06 | End: 2023-03-05

## 2022-10-31 DIAGNOSIS — F32.0 CURRENT MILD EPISODE OF MAJOR DEPRESSIVE DISORDER WITHOUT PRIOR EPISODE (HCC): ICD-10-CM

## 2022-10-31 RX ORDER — BUPROPION HYDROCHLORIDE 300 MG/1
TABLET ORAL
Qty: 90 TABLET | Refills: 1 | Status: SHIPPED | OUTPATIENT
Start: 2022-10-31

## 2022-10-31 NOTE — TELEPHONE ENCOUNTER
Medication:   Requested Prescriptions     Pending Prescriptions Disp Refills    buPROPion (WELLBUTRIN XL) 300 MG extended release tablet [Pharmacy Med Name: BUPROPION HCL  MG TABLET] 90 tablet 1     Sig: TAKE 1 TABLET BY MOUTH EVERY DAY IN THE MORNING        Last Filled:  4/29/2022    Patient Phone Number: [phone] (home) [phone] (work)    Last appt: 5/26/2022   Next appt: Visit date not found    Last OARRS:   RX Monitoring 11/15/2018   Attestation The Prescription Monitoring Report for this patient was reviewed today. Periodic Controlled Substance Monitoring Possible medication side effects, risk of tolerance/dependence & alternative treatments discussed. ;No signs of potential drug abuse or diversion identified.

## 2022-11-16 ENCOUNTER — TELEMEDICINE (OUTPATIENT)
Dept: FAMILY MEDICINE CLINIC | Age: 62
End: 2022-11-16
Payer: COMMERCIAL

## 2022-11-16 DIAGNOSIS — K21.9 GASTROESOPHAGEAL REFLUX DISEASE, UNSPECIFIED WHETHER ESOPHAGITIS PRESENT: ICD-10-CM

## 2022-11-16 DIAGNOSIS — N39.41 URGE INCONTINENCE: ICD-10-CM

## 2022-11-16 DIAGNOSIS — N32.81 OAB (OVERACTIVE BLADDER): Primary | ICD-10-CM

## 2022-11-16 PROCEDURE — 99214 OFFICE O/P EST MOD 30 MIN: CPT | Performed by: STUDENT IN AN ORGANIZED HEALTH CARE EDUCATION/TRAINING PROGRAM

## 2022-11-16 RX ORDER — ESOMEPRAZOLE MAGNESIUM 40 MG/1
40 CAPSULE, DELAYED RELEASE ORAL
Qty: 90 CAPSULE | Refills: 1 | Status: SHIPPED | OUTPATIENT
Start: 2022-11-16

## 2022-11-16 SDOH — ECONOMIC STABILITY: FOOD INSECURITY: WITHIN THE PAST 12 MONTHS, YOU WORRIED THAT YOUR FOOD WOULD RUN OUT BEFORE YOU GOT MONEY TO BUY MORE.: NEVER TRUE

## 2022-11-16 SDOH — ECONOMIC STABILITY: FOOD INSECURITY: WITHIN THE PAST 12 MONTHS, THE FOOD YOU BOUGHT JUST DIDN'T LAST AND YOU DIDN'T HAVE MONEY TO GET MORE.: NEVER TRUE

## 2022-11-16 ASSESSMENT — PATIENT HEALTH QUESTIONNAIRE - PHQ9
8. MOVING OR SPEAKING SO SLOWLY THAT OTHER PEOPLE COULD HAVE NOTICED. OR THE OPPOSITE, BEING SO FIGETY OR RESTLESS THAT YOU HAVE BEEN MOVING AROUND A LOT MORE THAN USUAL: 0
3. TROUBLE FALLING OR STAYING ASLEEP: 0
5. POOR APPETITE OR OVEREATING: 0
SUM OF ALL RESPONSES TO PHQ QUESTIONS 1-9: 0
7. TROUBLE CONCENTRATING ON THINGS, SUCH AS READING THE NEWSPAPER OR WATCHING TELEVISION: 0
4. FEELING TIRED OR HAVING LITTLE ENERGY: 0
1. LITTLE INTEREST OR PLEASURE IN DOING THINGS: 0
10. IF YOU CHECKED OFF ANY PROBLEMS, HOW DIFFICULT HAVE THESE PROBLEMS MADE IT FOR YOU TO DO YOUR WORK, TAKE CARE OF THINGS AT HOME, OR GET ALONG WITH OTHER PEOPLE: 0
SUM OF ALL RESPONSES TO PHQ QUESTIONS 1-9: 0
6. FEELING BAD ABOUT YOURSELF - OR THAT YOU ARE A FAILURE OR HAVE LET YOURSELF OR YOUR FAMILY DOWN: 0
9. THOUGHTS THAT YOU WOULD BE BETTER OFF DEAD, OR OF HURTING YOURSELF: 0
SUM OF ALL RESPONSES TO PHQ QUESTIONS 1-9: 0
SUM OF ALL RESPONSES TO PHQ QUESTIONS 1-9: 0
SUM OF ALL RESPONSES TO PHQ9 QUESTIONS 1 & 2: 0
2. FEELING DOWN, DEPRESSED OR HOPELESS: 0

## 2022-11-16 ASSESSMENT — SOCIAL DETERMINANTS OF HEALTH (SDOH): HOW HARD IS IT FOR YOU TO PAY FOR THE VERY BASICS LIKE FOOD, HOUSING, MEDICAL CARE, AND HEATING?: NOT HARD AT ALL

## 2022-11-16 NOTE — PROGRESS NOTES
[address] [facility] Note    Date: 11/16/2022      Assessment/Plan:     1. OAB (overactive bladder) controlled but insurance issues  2. Urge incontinence  3. Gastroesophageal reflux disease, unspecified whether esophagitis present controlled but insurance issues    Trial gemtesa (vibegron) as another B3 agonist and insurance will cover (in place of myrbetriq)  Consider oxybutynin if vibegron not covered in future  Trial nexium in place of dexlansoprazole since dexlansoprazole not covered  To get COVID booster  To come for flu vaccine and pneumonia shot (hx of cardiomyopathy)  To come for in office visit/physical for labs, etc.  Orders Placed This Encounter   Medications    Vibegron 75 MG TABS     Sig: Take 75 mg by mouth daily     Dispense:  90 tablet     Refill:  1    esomeprazole (NEXIUM) 40 MG delayed release capsule     Sig: Take 1 capsule by mouth every morning (before breakfast)     Dispense:  90 capsule     Refill:  1       Orders Placed This Encounter   Procedures    Pneumococcal, PCV20, PREVNAR 20, (age 25 yrs+), IM, PF    Influenza, FLUCELVAX, (age 10 mo+), IM, Preservative Free, 0.5 mL       If any chest pain, shortness of breath, trouble breathing or other concerning symptoms to go to ER. Return in about 4 weeks (around 12/14/2022), or if symptoms worsen or fail to improve. Due to the current coronavirus pandemic, this telephone/video visit was insisted, with patient's  (and/or legal guardian's) consent, to reduce the patient's risk of exposure to COVID-19 and provide necessary medical care. The patient was at home while the provider was either at home or at the clinic. Services were provided through a synchronous discussion over the telephone and/or video chat to substitute for in person clinic visit, and coded as such.  The patient (and/or legal guardian) has also been advised to contact this office for worsening conditions or problems, and seek emergency medical treatment and/or call 911 if deemed necessary. Discussed medication(s) risks, benefits, side effects, adverse reactions and interactions with patient. Patient voiced understanding. Subjective/Objective:     Chief Complaint   Patient presents with    Medication Problem     Discuss medication no longer covered by insurance       HPI    Dx w/ urge incontinence / overactive bladder with urology group Dr Kuhn Comment and was put on mirabegron which she did well w/ but insurance is no longer ocvering. Insurance will cover the below medications from what she looked up:  Darifenacin XR  Oxybutynin XR  Solifenacin   Tolteridine XR  Trospium and XR  Gemtesa (vibegron)  Toviav     GERd: Previously on dexlansoprazole but isnurance not covering. Was on nexium before but then stopped working as was stressed w/ her job. Now her job is less stressful and insurance will cover nexium. Questions about COVID vaccines and boosters. Wt Readings from Last 3 Encounters:   01/24/22 159 lb 9.6 oz (72.4 kg)   01/21/22 160 lb (72.6 kg)   11/15/21 156 lb 8 oz (71 kg)     There is no height or weight on file to calculate BMI. BP Readings from Last 3 Encounters:   01/24/22 112/74   01/21/22 110/60   11/15/21 (!) 162/133     The ASCVD Risk score (Candida VELASCO, et al., 2019) failed to calculate for the following reasons:     The patient has a prior MI or stroke diagnosis    ROS: denies nausea/vomiting, fevers, chills, chest pain, shortness of breath, diarrhea, constipation, blood in the urine or stool         Patient Active Problem List   Diagnosis    Hemorrhoid    Nevus    Lipid disorder    Fibrocystic disease of breast    Acne necrotica    Depression    GERD (gastroesophageal reflux disease)    Vitamin D deficiency    Postmenopausal osteoporosis    Chronic pain of both knees    Hyperlipidemia    Chronic pain of both shoulders    Chronic left-sided low back pain without sciatica    NSTEMI (non-ST elevated myocardial infarction) [facility]    Secondary cardiomyopathy ([address]    History of colonic polyps    Primary osteoarthritis of left knee     Past Medical History:   Diagnosis Date    Alcoholism ([address]     Recovering    Cardiomyopathy ([facility]     2021    Chronic left-sided low back pain without sciatica 05/26/2020    Degenerative joint disease     Depression     Fibrocystic disease of breast 06/15/2010    GERD (gastroesophageal reflux disease)     Headache(784.0)     Hemorrhoid 06/15/2010    Hyperlipidemia 06/25/2019    Mitral valve regurgitation     Osteoporosis     Screening mammogram, encounter for 02/04/2018    Dr Raven Bates MD    Urinary incontinence     Vitamin D deficiency 03/16/2012       Past Surgical History:   Procedure Laterality Date    APPENDECTOMY  01/13/2021    BLADDER SURGERY      with hysterectomy-vaginal approach    BREAST SURGERY  2006    benign cyst on left    CARDIAC CATHETERIZATION  01/29/2021    COLONOSCOPY  11/30/2012    Dr Mckenna Collazo    COSMETIC SURGERY      facial 12.11    FACIAL COSMETIC SURGERY      HYSTERECTOMY (CERVIX STATUS UNKNOWN)  1996    KNEE ARTHROPLASTY Left 9/20/2021    OPEN PATELLOFEMORAL  ARTHROPLASTY WITH ROBOTIC NAVIGATION- LEFT KNEE  - Bernardine [employer] NEPHEW ([id number]) performed by Sheyla Holloway MD at [address] [place] [address]    SALPINGO-OOPHORECTOMY      still with left ovary    TOTAL KNEE ARTHROPLASTY Right 11/15/2021    RIGHT KNEE PATELLOFEMORAL ARTHROPLASTY WITH ROBOTIC NAVIGATION - KERN AND NEPHEW performed by Sheyla Holloway MD at [address] [place] [address]       Current Outpatient Medications   Medication Sig Dispense Refill    Vibegron 75 MG TABS Take 75 mg by mouth daily 90 tablet 1    esomeprazole (NEXIUM) 40 MG delayed release capsule Take 1 capsule by mouth every morning (before breakfast) 90 capsule 1    buPROPion (WELLBUTRIN XL) 300 MG extended release tablet TAKE 1 TABLET BY MOUTH EVERY DAY IN THE MORNING 90 tablet 1    pregabalin (LYRICA) 150 MG capsule TAKE 1 CAPSULE BY MOUTH NIGHTLY  DAYS. 90 capsule 1    traZODone (DESYREL) 100 MG tablet TAKE 2 TABLETS BY MOUTH EVERY NIGHT 180 tablet 2    carvedilol (COREG) 6.25 MG tablet TAKE 1 TABLET BY MOUTH 2 TIMES DAILY (WITH MEALS)      Estradiol (YUVAFEM) 10 MCG TABS vaginal tablet Place 1 tablet vaginally Twice a Week 24 tablet 3    oxyCODONE (ROXICODONE) 5 MG immediate release tablet       acetaminophen (APAP EXTRA STRENGTH) 500 MG tablet Take 2 tablets by mouth every 6 hours as needed for Pain 60 tablet 3    aspirin EC 81 MG EC tablet Take 1 tablet by mouth 2 times daily 60 tablet 0    ibuprofen (ADVIL;MOTRIN) 800 MG tablet Take 1 tablet by mouth every 6 hours as needed for Pain 120 tablet 3    MAGNESIUM-POTASSIUM PO Take by mouth daily      Calcium Carbonate-Vit D-Min (CALCIUM 1200 PO) Take by mouth daily      Multiple Vitamins-Minerals (THERAPEUTIC MULTIVITAMIN-MINERALS) tablet Take 1 tablet by mouth daily      valsartan (DIOVAN) 80 MG tablet Take 1 tablet by mouth daily 30 tablet 3    Omega-3 Fatty Acids (FISH OIL PO) Take by mouth      Cholecalciferol (VITAMIN D PO) Take by mouth       No current facility-administered medications for this visit.      Allergies   Allergen Reactions    Lisinopril      Cough       Social History     Socioeconomic History    Marital status:      Spouse name: None    Number of children: None    Years of education: None    Highest education level: None   Tobacco Use    Smoking status: Never    Smokeless tobacco: Never   Vaping Use    Vaping Use: Never used   Substance and Sexual Activity    Alcohol use: No     Alcohol/week: 0.0 standard drinks     Comment: none since 2003    Drug use: No    Sexual activity: Yes     Partners: Male     Social Determinants of Health     Financial Resource Strain: Low Risk     Difficulty of Paying Living Expenses: Not hard at all   Food Insecurity: No Food Insecurity    Worried About [address] in the Last Year: Never true    [address] in the Last Year: Never true     Family History   Problem Relation Age of Onset    High Blood Pressure Mother     High Cholesterol Mother     High Blood Pressure Father     High Cholesterol Father     Arthritis Father     Arthritis Paternal Grandmother     Rheum Arthritis Neg Hx     Osteoarthritis Neg Hx     Asthma Neg Hx     Breast Cancer Neg Hx     Cancer Neg Hx     Diabetes Neg Hx     Heart Failure Neg Hx     Hypertension Neg Hx     Migraines Neg Hx     Ovarian Cancer Neg Hx     Rashes/Skin Problems Neg Hx     Seizures Neg Hx     Stroke Neg Hx     Thyroid Disease Neg Hx          Vitals: There were no vitals taken for this visit. Physical Exam   There were no vitals taken for this visit. GEN:  alert and pleasant, in NAD  HEENT:  NCAT, EOM intact, no facial asymmetry   NECK:  good range of motion  RR: in NAD over video, normal respiratory rate, talking in complete sentences without difficulty  EXT: No rash or edema observed over video  NEURO: Alert oriented to person/place/date and time, normal mood and affect, able to follow commands  No focal changes over video     Yen Grimaldo MD    11/16/2022 9:21 AM    Documentation was done using voice recognition dragon software. Every effort was made to ensure accuracy; however, inadvertent, unintentional computerized transcription errors may be present.

## 2022-11-22 DIAGNOSIS — K21.9 GASTROESOPHAGEAL REFLUX DISEASE, UNSPECIFIED WHETHER ESOPHAGITIS PRESENT: ICD-10-CM

## 2022-11-22 RX ORDER — DEXLANSOPRAZOLE 30 MG/1
30 CAPSULE, DELAYED RELEASE ORAL DAILY
Qty: 90 CAPSULE | Refills: 3 | OUTPATIENT
Start: 2022-11-22

## 2022-11-22 NOTE — TELEPHONE ENCOUNTER
Medication and Quantity requested:   DEXLANSOPRAZOLE DR 30 MG CAP    QTY: 90    Last Visit: 11/16/2022        Pharmacy and phone number updated in EPIC:  [facility] pharmacy

## 2022-11-22 NOTE — TELEPHONE ENCOUNTER
Medication:   Requested Prescriptions     Pending Prescriptions Disp Refills    dexlansoprazole (DEXILANT) 30 MG CPDR delayed release capsule 90 capsule 3     Sig: Take 30 mg by mouth daily        Last Filled:      Patient Phone Number: [phone] (home) [phone] (work)    Last appt: 11/16/2022   Next appt: Visit date not found    Last OARRS:   RX Monitoring 11/15/2018   Attestation The Prescription Monitoring Report for this patient was reviewed today. Periodic Controlled Substance Monitoring Possible medication side effects, risk of tolerance/dependence & alternative treatments discussed. ;No signs of potential drug abuse or diversion identified.

## 2023-03-03 DIAGNOSIS — G89.29 CHRONIC PAIN OF RIGHT KNEE: ICD-10-CM

## 2023-03-03 DIAGNOSIS — M25.561 CHRONIC PAIN OF RIGHT KNEE: ICD-10-CM

## 2023-03-03 RX ORDER — PREGABALIN 150 MG/1
150 CAPSULE ORAL NIGHTLY
Qty: 90 CAPSULE | Refills: 1 | Status: SHIPPED | OUTPATIENT
Start: 2023-03-03 | End: 2023-08-30

## 2023-03-03 NOTE — TELEPHONE ENCOUNTER
Medication:   Requested Prescriptions     Pending Prescriptions Disp Refills    pregabalin (LYRICA) 150 MG capsule [Pharmacy Med Name: PREGABALIN 150 MG CAPSULE] 90 capsule 1     Sig: TAKE 1 CAPSULE BY MOUTH NIGHTLY  DAYS. Last Filled:  9/6/2022, 90, 1    Patient Phone Number: [phone] (home) [phone] (work)    Last appt: 11/16/2022   Next appt: Visit date not found    Last OARRS:   RX Monitoring 11/15/2018   Attestation The Prescription Monitoring Report for this patient was reviewed today. Periodic Controlled Substance Monitoring Possible medication side effects, risk of tolerance/dependence & alternative treatments discussed. ;No signs of potential drug abuse or diversion identified.

## 2023-03-22 ENCOUNTER — TELEPHONE (OUTPATIENT)
Dept: FAMILY MEDICINE CLINIC | Age: 63
End: 2023-03-22

## 2023-03-30 ENCOUNTER — TELEPHONE (OUTPATIENT)
Dept: FAMILY MEDICINE CLINIC | Age: 63
End: 2023-03-30

## 2023-03-30 NOTE — TELEPHONE ENCOUNTER
esomeprazole (NEXIUM) 40 MG delayed release capsule    Alternative Requested - max per ins 90/365 - send alternative or PA.

## 2023-03-31 DIAGNOSIS — K21.9 GASTROESOPHAGEAL REFLUX DISEASE, UNSPECIFIED WHETHER ESOPHAGITIS PRESENT: Primary | ICD-10-CM

## 2023-03-31 RX ORDER — PANTOPRAZOLE SODIUM 40 MG/1
40 TABLET, DELAYED RELEASE ORAL
Qty: 90 TABLET | Refills: 1 | Status: SHIPPED | OUTPATIENT
Start: 2023-03-31

## 2023-04-03 ENCOUNTER — OFFICE VISIT (OUTPATIENT)
Dept: GYNECOLOGY | Age: 63
End: 2023-04-03

## 2023-04-03 VITALS
OXYGEN SATURATION: 98 % | HEIGHT: 70 IN | WEIGHT: 168 LBS | HEART RATE: 57 BPM | SYSTOLIC BLOOD PRESSURE: 118 MMHG | BODY MASS INDEX: 24.05 KG/M2 | DIASTOLIC BLOOD PRESSURE: 60 MMHG | RESPIRATION RATE: 17 BRPM

## 2023-04-03 DIAGNOSIS — Z01.419 WELL WOMAN EXAM WITH ROUTINE GYNECOLOGICAL EXAM: Primary | ICD-10-CM

## 2023-04-03 RX ORDER — ESTRADIOL 10 UG/1
10 INSERT VAGINAL
Qty: 24 TABLET | Refills: 3 | Status: SHIPPED | OUTPATIENT
Start: 2023-04-03

## 2023-04-09 ASSESSMENT — ENCOUNTER SYMPTOMS
ALLERGIC/IMMUNOLOGIC NEGATIVE: 1
EYES NEGATIVE: 1
RESPIRATORY NEGATIVE: 1
GASTROINTESTINAL NEGATIVE: 1

## 2023-04-09 NOTE — PROGRESS NOTES
buPROPion (WELLBUTRIN XL) 300 MG extended release tablet TAKE 1 TABLET BY MOUTH EVERY DAY IN THE MORNING 90 tablet 1    traZODone (DESYREL) 100 MG tablet TAKE 2 TABLETS BY MOUTH EVERY NIGHT 180 tablet 2    carvedilol (COREG) 6.25 MG tablet TAKE 1 TABLET BY MOUTH 2 TIMES DAILY (WITH MEALS)      oxyCODONE (ROXICODONE) 5 MG immediate release tablet       acetaminophen (APAP EXTRA STRENGTH) 500 MG tablet Take 2 tablets by mouth every 6 hours as needed for Pain 60 tablet 3    aspirin EC 81 MG EC tablet Take 1 tablet by mouth 2 times daily 60 tablet 0    ibuprofen (ADVIL;MOTRIN) 800 MG tablet Take 1 tablet by mouth every 6 hours as needed for Pain 120 tablet 3    MAGNESIUM-POTASSIUM PO Take by mouth daily      Calcium Carbonate-Vit D-Min (CALCIUM 1200 PO) Take by mouth daily      Multiple Vitamins-Minerals (THERAPEUTIC MULTIVITAMIN-MINERALS) tablet Take 1 tablet by mouth daily      valsartan (DIOVAN) 80 MG tablet Take 1 tablet by mouth daily 30 tablet 3    Omega-3 Fatty Acids (FISH OIL PO) Take by mouth      Cholecalciferol (VITAMIN D PO) Take by mouth       Family History   Problem Relation Age of Onset    High Blood Pressure Mother     High Cholesterol Mother     High Blood Pressure Father     High Cholesterol Father     Arthritis Father     Arthritis Paternal Grandmother     Rheum Arthritis Neg Hx     Osteoarthritis Neg Hx     Asthma Neg Hx     Breast Cancer Neg Hx     Cancer Neg Hx     Diabetes Neg Hx     Heart Failure Neg Hx     Hypertension Neg Hx     Migraines Neg Hx     Ovarian Cancer Neg Hx     Rashes/Skin Problems Neg Hx     Seizures Neg Hx     Stroke Neg Hx     Thyroid Disease Neg Hx      /60 (Site: Right Upper Arm, Position: Sitting, Cuff Size: Medium Adult)   Pulse 57   Resp 17   Ht 5' 10\" (1.778 m)   Wt 168 lb (76.2 kg)   LMP  (LMP Unknown)   SpO2 98%   BMI 24.11 kg/m²       Objective:   Physical Exam  Constitutional:       General: She is not in acute distress.      Appearance: Normal

## 2023-05-13 DIAGNOSIS — F32.0 CURRENT MILD EPISODE OF MAJOR DEPRESSIVE DISORDER WITHOUT PRIOR EPISODE (HCC): ICD-10-CM

## 2023-05-15 RX ORDER — BUPROPION HYDROCHLORIDE 300 MG/1
TABLET ORAL
Qty: 90 TABLET | Refills: 0 | Status: SHIPPED | OUTPATIENT
Start: 2023-05-15

## 2023-05-15 NOTE — TELEPHONE ENCOUNTER
Medication:   Requested Prescriptions     Pending Prescriptions Disp Refills    buPROPion (WELLBUTRIN XL) 300 MG extended release tablet [Pharmacy Med Name: BUPROPION HCL  MG TABLET] 90 tablet 1     Sig: TAKE 1 TABLET BY MOUTH EVERY DAY IN THE MORNING        Last Filled:      Patient Phone Number: [phone] (home) [phone] (work)    Last appt: 11/16/2022   Next appt: Visit date not found    Last OARRS:   RX Monitoring 11/15/2018   Attestation The Prescription Monitoring Report for this patient was reviewed today. Periodic Controlled Substance Monitoring Possible medication side effects, risk of tolerance/dependence & alternative treatments discussed. ;No signs of potential drug abuse or diversion identified.      LVMTCB pt due for OV

## 2023-05-17 ENCOUNTER — OFFICE VISIT (OUTPATIENT)
Dept: FAMILY MEDICINE CLINIC | Age: 63
End: 2023-05-17
Payer: COMMERCIAL

## 2023-05-17 VITALS
OXYGEN SATURATION: 99 % | HEIGHT: 70 IN | SYSTOLIC BLOOD PRESSURE: 98 MMHG | WEIGHT: 162.6 LBS | HEART RATE: 47 BPM | BODY MASS INDEX: 23.28 KG/M2 | DIASTOLIC BLOOD PRESSURE: 62 MMHG

## 2023-05-17 DIAGNOSIS — F33.0 MILD EPISODE OF RECURRENT MAJOR DEPRESSIVE DISORDER (HCC): ICD-10-CM

## 2023-05-17 DIAGNOSIS — Z00.00 WELL ADULT EXAM: Primary | ICD-10-CM

## 2023-05-17 DIAGNOSIS — I42.9 SECONDARY CARDIOMYOPATHY (HCC): ICD-10-CM

## 2023-05-17 DIAGNOSIS — Z00.00 WELL ADULT EXAM: ICD-10-CM

## 2023-05-17 PROBLEM — F32.0 CURRENT MILD EPISODE OF MAJOR DEPRESSIVE DISORDER WITHOUT PRIOR EPISODE (HCC): Status: ACTIVE | Noted: 2023-05-17

## 2023-05-17 LAB
BASOPHILS # BLD: 0 K/UL (ref 0–0.2)
BASOPHILS NFR BLD: 0.7 %
DEPRECATED RDW RBC AUTO: 12.5 % (ref 12.4–15.4)
EOSINOPHIL # BLD: 0.2 K/UL (ref 0–0.6)
EOSINOPHIL NFR BLD: 3.7 %
HCT VFR BLD AUTO: 39.8 % (ref 36–48)
HGB BLD-MCNC: 13.4 G/DL (ref 12–16)
LYMPHOCYTES # BLD: 1.2 K/UL (ref 1–5.1)
LYMPHOCYTES NFR BLD: 25.7 %
MCH RBC QN AUTO: 29.8 PG (ref 26–34)
MCHC RBC AUTO-ENTMCNC: 33.6 G/DL (ref 31–36)
MCV RBC AUTO: 88.7 FL (ref 80–100)
MONOCYTES # BLD: 0.4 K/UL (ref 0–1.3)
MONOCYTES NFR BLD: 8.4 %
NEUTROPHILS # BLD: 2.9 K/UL (ref 1.7–7.7)
NEUTROPHILS NFR BLD: 61.5 %
PLATELET # BLD AUTO: 256 K/UL (ref 135–450)
PMV BLD AUTO: 9.4 FL (ref 5–10.5)
RBC # BLD AUTO: 4.49 M/UL (ref 4–5.2)
TSH SERPL DL<=0.005 MIU/L-ACNC: 1.93 UIU/ML (ref 0.27–4.2)
WBC # BLD AUTO: 4.8 K/UL (ref 4–11)

## 2023-05-17 PROCEDURE — 99396 PREV VISIT EST AGE 40-64: CPT | Performed by: NURSE PRACTITIONER

## 2023-05-17 SDOH — ECONOMIC STABILITY: HOUSING INSECURITY
IN THE LAST 12 MONTHS, WAS THERE A TIME WHEN YOU DID NOT HAVE A STEADY PLACE TO SLEEP OR SLEPT IN A SHELTER (INCLUDING NOW)?: NO

## 2023-05-17 SDOH — ECONOMIC STABILITY: FOOD INSECURITY: WITHIN THE PAST 12 MONTHS, THE FOOD YOU BOUGHT JUST DIDN'T LAST AND YOU DIDN'T HAVE MONEY TO GET MORE.: NEVER TRUE

## 2023-05-17 SDOH — ECONOMIC STABILITY: INCOME INSECURITY: HOW HARD IS IT FOR YOU TO PAY FOR THE VERY BASICS LIKE FOOD, HOUSING, MEDICAL CARE, AND HEATING?: NOT HARD AT ALL

## 2023-05-17 SDOH — ECONOMIC STABILITY: FOOD INSECURITY: WITHIN THE PAST 12 MONTHS, YOU WORRIED THAT YOUR FOOD WOULD RUN OUT BEFORE YOU GOT MONEY TO BUY MORE.: NEVER TRUE

## 2023-05-17 ASSESSMENT — PATIENT HEALTH QUESTIONNAIRE - PHQ9
6. FEELING BAD ABOUT YOURSELF - OR THAT YOU ARE A FAILURE OR HAVE LET YOURSELF OR YOUR FAMILY DOWN: 0
1. LITTLE INTEREST OR PLEASURE IN DOING THINGS: 0
SUM OF ALL RESPONSES TO PHQ9 QUESTIONS 1 & 2: 0
SUM OF ALL RESPONSES TO PHQ QUESTIONS 1-9: 0
SUM OF ALL RESPONSES TO PHQ QUESTIONS 1-9: 0
5. POOR APPETITE OR OVEREATING: 0
7. TROUBLE CONCENTRATING ON THINGS, SUCH AS READING THE NEWSPAPER OR WATCHING TELEVISION: 0
8. MOVING OR SPEAKING SO SLOWLY THAT OTHER PEOPLE COULD HAVE NOTICED. OR THE OPPOSITE, BEING SO FIGETY OR RESTLESS THAT YOU HAVE BEEN MOVING AROUND A LOT MORE THAN USUAL: 0
SUM OF ALL RESPONSES TO PHQ QUESTIONS 1-9: 0
SUM OF ALL RESPONSES TO PHQ QUESTIONS 1-9: 0
4. FEELING TIRED OR HAVING LITTLE ENERGY: 0
2. FEELING DOWN, DEPRESSED OR HOPELESS: 0
10. IF YOU CHECKED OFF ANY PROBLEMS, HOW DIFFICULT HAVE THESE PROBLEMS MADE IT FOR YOU TO DO YOUR WORK, TAKE CARE OF THINGS AT HOME, OR GET ALONG WITH OTHER PEOPLE: 0
9. THOUGHTS THAT YOU WOULD BE BETTER OFF DEAD, OR OF HURTING YOURSELF: 0
3. TROUBLE FALLING OR STAYING ASLEEP: 0

## 2023-05-17 NOTE — PROGRESS NOTES
Chief Complaint:   Cassie Montiel is a 59 y.o. female who presents for complete physical examination. History of Present Illness:    Feeling good. No complaints today. Compliant with medications.      Past Medical History:   Diagnosis Date    Alcoholism ([address]     Recovering    Cardiomyopathy ([facility] [address]     2021    Chronic left-sided low back pain without sciatica 05/26/2020    Degenerative joint disease     Depression     Fibrocystic disease of breast 06/15/2010    GERD (gastroesophageal reflux disease)     Headache(784.0)     Hemorrhoid 06/15/2010    Hyperlipidemia 06/25/2019    Mitral valve regurgitation     Osteoporosis     Screening mammogram, encounter for 02/04/2018    Dr Raul Soares MD    Urinary incontinence     Vitamin D deficiency 03/16/2012       Past Surgical History:   Procedure Laterality Date    APPENDECTOMY  01/13/2021    BLADDER SURGERY      with hysterectomy-vaginal approach    BREAST SURGERY  2006    benign cyst on left    CARDIAC CATHETERIZATION  01/29/2021    COLONOSCOPY  11/30/2012    Dr Ervin Hernandez    COSMETIC SURGERY      facial 12.11    FACIAL COSMETIC SURGERY      HYSTERECTOMY (CERVIX STATUS UNKNOWN)  1996    KNEE ARTHROPLASTY Left 9/20/2021    OPEN PATELLOFEMORAL  ARTHROPLASTY WITH ROBOTIC NAVIGATION- LEFT KNEE  - [facility] & NEPHEW ([id number]) performed by Jean Clinton MD at [facility]    SALPINGO-OOPHORECTOMY      still with left ovary    TOTAL KNEE ARTHROPLASTY Right 11/15/2021    RIGHT KNEE PATELLOFEMORAL ARTHROPLASTY WITH ROBOTIC NAVIGATION - KERN AND NEPHEW performed by Jean Clinton MD at [facility]       Outpatient Medications Marked as Taking for the 5/17/23 encounter (Office Visit) with LANCE Stephens - CNP   Medication Sig Dispense Refill    buPROPion (WELLBUTRIN XL) 300 MG extended release tablet TAKE 1 TABLET BY MOUTH EVERY DAY IN THE MORNING 90 tablet 0    Estradiol (YUVAFEM) 10 MCG TABS vaginal tablet Place 1 tablet vaginally Twice a Week 24 tablet 3

## 2023-05-18 LAB
ALBUMIN SERPL-MCNC: 4.4 G/DL (ref 3.4–5)
ALBUMIN/GLOB SERPL: 2 {RATIO} (ref 1.1–2.2)
ALP SERPL-CCNC: 61 U/L (ref 40–129)
ALT SERPL-CCNC: 15 U/L (ref 10–40)
ANION GAP SERPL CALCULATED.3IONS-SCNC: 11 MMOL/L (ref 3–16)
AST SERPL-CCNC: 18 U/L (ref 15–37)
BILIRUB SERPL-MCNC: 0.4 MG/DL (ref 0–1)
BUN SERPL-MCNC: 9 MG/DL (ref 7–20)
CALCIUM SERPL-MCNC: 9.6 MG/DL (ref 8.3–10.6)
CHLORIDE SERPL-SCNC: 103 MMOL/L (ref 99–110)
CHOLEST SERPL-MCNC: 230 MG/DL (ref 0–199)
CO2 SERPL-SCNC: 27 MMOL/L (ref 21–32)
CREAT SERPL-MCNC: 0.9 MG/DL (ref 0.6–1.2)
GFR SERPLBLD CREATININE-BSD FMLA CKD-EPI: >60 ML/MIN/{1.73_M2}
GLUCOSE P FAST SERPL-MCNC: 91 MG/DL (ref 70–99)
HDLC SERPL-MCNC: 69 MG/DL (ref 40–60)
LDL CHOLESTEROL CALCULATED: 145 MG/DL
POTASSIUM SERPL-SCNC: 4.7 MMOL/L (ref 3.5–5.1)
PROT SERPL-MCNC: 6.6 G/DL (ref 6.4–8.2)
SODIUM SERPL-SCNC: 141 MMOL/L (ref 136–145)
TRIGL SERPL-MCNC: 78 MG/DL (ref 0–150)
VLDLC SERPL CALC-MCNC: 16 MG/DL

## 2023-05-22 DIAGNOSIS — E78.00 PURE HYPERCHOLESTEROLEMIA: Primary | ICD-10-CM

## 2023-05-22 RX ORDER — ROSUVASTATIN CALCIUM 10 MG/1
10 TABLET, COATED ORAL NIGHTLY
Qty: 90 TABLET | Refills: 1 | Status: SHIPPED | OUTPATIENT
Start: 2023-05-22 | End: 2023-11-18

## 2023-05-24 ENCOUNTER — TELEPHONE (OUTPATIENT)
Dept: FAMILY MEDICINE CLINIC | Age: 63
End: 2023-05-24

## 2023-05-24 DIAGNOSIS — F32.0 CURRENT MILD EPISODE OF MAJOR DEPRESSIVE DISORDER WITHOUT PRIOR EPISODE (HCC): ICD-10-CM

## 2023-05-24 RX ORDER — TRAZODONE HYDROCHLORIDE 100 MG/1
TABLET ORAL
Qty: 180 TABLET | Refills: 3 | Status: SHIPPED | OUTPATIENT
Start: 2023-05-24

## 2023-05-24 NOTE — TELEPHONE ENCOUNTER
Patient is calling about her cholesterol and is wondering if there was a decision made about putting patient on statin?         Please give patient a call to discuss

## 2023-05-30 ENCOUNTER — TELEPHONE (OUTPATIENT)
Dept: FAMILY MEDICINE CLINIC | Age: 63
End: 2023-05-30

## 2023-05-30 NOTE — TELEPHONE ENCOUNTER
[facility] syed sent over a fax for Javan Owen CNP    I have scanned it into this encounter for it to be signed off on

## 2023-05-31 DIAGNOSIS — F32.0 CURRENT MILD EPISODE OF MAJOR DEPRESSIVE DISORDER WITHOUT PRIOR EPISODE (HCC): ICD-10-CM

## 2023-05-31 RX ORDER — BUPROPION HYDROCHLORIDE 300 MG/1
TABLET ORAL
Qty: 90 TABLET | Refills: 3 | Status: SHIPPED | OUTPATIENT
Start: 2023-05-31

## 2023-05-31 NOTE — TELEPHONE ENCOUNTER
Medication:   Requested Prescriptions     Pending Prescriptions Disp Refills    buPROPion (WELLBUTRIN XL) 300 MG extended release tablet [Pharmacy Med Name: BUPROPION HCL  MG TABLET] 90 tablet 0     Sig: TAKE 1 TABLET BY MOUTH EVERY DAY IN THE MORNING        Last Filled:      Patient Phone Number: [phone] (home) [phone] (work)    Last appt: 5/17/2023   Next appt: Visit date not found    Last OARRS:   RX Monitoring 11/15/2018   Attestation The Prescription Monitoring Report for this patient was reviewed today. Periodic Controlled Substance Monitoring Possible medication side effects, risk of tolerance/dependence & alternative treatments discussed. ;No signs of potential drug abuse or diversion identified.

## 2023-06-01 NOTE — TELEPHONE ENCOUNTER
Medication:   Requested Prescriptions     Pending Prescriptions Disp Refills    GEMTESA 75 MG TABS tablet [Pharmacy Med Name: Ayesha Stokes 75 MG TABLET] 90 tablet 1     Sig: TAKE 1 TABLET BY MOUTH EVERY DAY        Last Filled:      Patient Phone Number: [phone] (home) [phone] (work)    Last appt: 5/17/2023   Next appt: Visit date not found    Last OARRS:   RX Monitoring 11/15/2018   Attestation The Prescription Monitoring Report for this patient was reviewed today. Periodic Controlled Substance Monitoring Possible medication side effects, risk of tolerance/dependence & alternative treatments discussed. ;No signs of potential drug abuse or diversion identified.

## 2023-06-02 RX ORDER — VIBEGRON 75 MG/1
TABLET, FILM COATED ORAL
Qty: 90 TABLET | Refills: 1 | Status: SHIPPED | OUTPATIENT
Start: 2023-06-02

## 2023-06-26 ENCOUNTER — OFFICE VISIT (OUTPATIENT)
Dept: FAMILY MEDICINE CLINIC | Age: 63
End: 2023-06-26
Payer: COMMERCIAL

## 2023-06-26 VITALS
SYSTOLIC BLOOD PRESSURE: 112 MMHG | WEIGHT: 161.8 LBS | BODY MASS INDEX: 23.22 KG/M2 | OXYGEN SATURATION: 98 % | DIASTOLIC BLOOD PRESSURE: 68 MMHG | HEART RATE: 58 BPM

## 2023-06-26 DIAGNOSIS — M79.642 PAIN IN BOTH HANDS: Primary | ICD-10-CM

## 2023-06-26 DIAGNOSIS — M79.641 PAIN IN BOTH HANDS: Primary | ICD-10-CM

## 2023-06-26 DIAGNOSIS — G89.29 CHRONIC PAIN OF LEFT KNEE: ICD-10-CM

## 2023-06-26 DIAGNOSIS — M25.562 CHRONIC PAIN OF LEFT KNEE: ICD-10-CM

## 2023-06-26 PROCEDURE — 99214 OFFICE O/P EST MOD 30 MIN: CPT | Performed by: NURSE PRACTITIONER

## 2023-06-26 ASSESSMENT — PATIENT HEALTH QUESTIONNAIRE - PHQ9
SUM OF ALL RESPONSES TO PHQ QUESTIONS 1-9: 0
7. TROUBLE CONCENTRATING ON THINGS, SUCH AS READING THE NEWSPAPER OR WATCHING TELEVISION: 0
2. FEELING DOWN, DEPRESSED OR HOPELESS: 0
SUM OF ALL RESPONSES TO PHQ QUESTIONS 1-9: 0
10. IF YOU CHECKED OFF ANY PROBLEMS, HOW DIFFICULT HAVE THESE PROBLEMS MADE IT FOR YOU TO DO YOUR WORK, TAKE CARE OF THINGS AT HOME, OR GET ALONG WITH OTHER PEOPLE: 0
1. LITTLE INTEREST OR PLEASURE IN DOING THINGS: 0
SUM OF ALL RESPONSES TO PHQ QUESTIONS 1-9: 0
SUM OF ALL RESPONSES TO PHQ QUESTIONS 1-9: 0
4. FEELING TIRED OR HAVING LITTLE ENERGY: 0
8. MOVING OR SPEAKING SO SLOWLY THAT OTHER PEOPLE COULD HAVE NOTICED. OR THE OPPOSITE, BEING SO FIGETY OR RESTLESS THAT YOU HAVE BEEN MOVING AROUND A LOT MORE THAN USUAL: 0
SUM OF ALL RESPONSES TO PHQ9 QUESTIONS 1 & 2: 0
5. POOR APPETITE OR OVEREATING: 0
3. TROUBLE FALLING OR STAYING ASLEEP: 0
6. FEELING BAD ABOUT YOURSELF - OR THAT YOU ARE A FAILURE OR HAVE LET YOURSELF OR YOUR FAMILY DOWN: 0
9. THOUGHTS THAT YOU WOULD BE BETTER OFF DEAD, OR OF HURTING YOURSELF: 0

## 2023-07-06 ENCOUNTER — OFFICE VISIT (OUTPATIENT)
Dept: ORTHOPEDIC SURGERY | Age: 63
End: 2023-07-06
Payer: COMMERCIAL

## 2023-07-06 VITALS — HEIGHT: 70 IN | BODY MASS INDEX: 22.19 KG/M2 | WEIGHT: 155 LBS

## 2023-07-06 DIAGNOSIS — Z98.890 HX OF LEFT KNEE SURGERY: ICD-10-CM

## 2023-07-06 DIAGNOSIS — Z98.890 HX OF RIGHT KNEE SURGERY: ICD-10-CM

## 2023-07-06 DIAGNOSIS — Z96.659 PAIN IN KNEE REGION AFTER REPLACEMENT OF KNEE JOINT: Primary | ICD-10-CM

## 2023-07-06 DIAGNOSIS — M25.569 PAIN IN KNEE REGION AFTER REPLACEMENT OF KNEE JOINT: Primary | ICD-10-CM

## 2023-07-06 PROCEDURE — 99214 OFFICE O/P EST MOD 30 MIN: CPT | Performed by: ORTHOPAEDIC SURGERY

## 2023-07-06 RX ORDER — MELOXICAM 15 MG/1
15 TABLET ORAL DAILY
Qty: 30 TABLET | Refills: 0 | Status: SHIPPED | OUTPATIENT
Start: 2023-07-06

## 2023-07-10 ENCOUNTER — HOSPITAL ENCOUNTER (OUTPATIENT)
Dept: PHYSICAL THERAPY | Age: 63
Setting detail: THERAPIES SERIES
Discharge: HOME | End: 2023-07-10
Payer: COMMERCIAL

## 2023-07-10 DIAGNOSIS — M25.562 CHRONIC PAIN OF BOTH KNEES: Primary | ICD-10-CM

## 2023-07-10 DIAGNOSIS — R29.898 BILATERAL LEG WEAKNESS: ICD-10-CM

## 2023-07-10 DIAGNOSIS — M25.561 CHRONIC PAIN OF BOTH KNEES: Primary | ICD-10-CM

## 2023-07-10 DIAGNOSIS — G89.29 CHRONIC PAIN OF BOTH KNEES: Primary | ICD-10-CM

## 2023-07-10 PROCEDURE — 97110 THERAPEUTIC EXERCISES: CPT

## 2023-07-10 PROCEDURE — 97161 PT EVAL LOW COMPLEX 20 MIN: CPT

## 2023-07-10 NOTE — PLAN OF CARE
instrument and/or measurable assessment of functional outcome. [x] EVAL (LOW) 71346 (typically 30 minutes face-to-face)  [] EVAL (MOD) 86529 (typically 30 minutes face-to-face)  [] EVAL (HIGH) 45618 (typically 45 minutes face-to-face)  [] RE-EVAL     PLAN:   Frequency/Duration:  2 days per week for 8 Weeks:  Interventions:  [x]  Therapeutic exercise including: strength training, ROM, for Lower extremity and core   [x]  NMR activation and proprioception for LE, Glutes and Core   [x]  Manual therapy as indicated for LE, Hip and spine to include: Dry Needling/IASTM, STM, PROM, Gr I-IV mobilizations, manipulation. [x] Modalities as needed that may include: thermal agents, E-stim, Biofeedback, US, iontophoresis as indicated  [x] Patient education on joint protection, postural re-education, activity modification, progression of HEP. HEP instruction: Written HEP instructions provided and reviewed. GOALS:  Patient stated goal: \"To have less pain\"  [] Progressing: [] Met: [] Not Met: [] Adjusted    Therapist goals for Patient:   Short Term Goals: To be achieved in: 2 weeks  1. Independent in HEP and progression per patient tolerance, in order to prevent re-injury. [] Progressing: [] Met: [] Not Met: [] Adjusted  2. Patient will have a decrease in pain to <3/10 to facilitate improvement in movement, function, and ADLs as indicated by Functional Deficits. [] Progressing: [] Met: [] Not Met: [] Adjusted    Long Term Goals: To be achieved in: 8 weeks  1. Patient will reach LEFS raw score of greater than or equal to 68/80 to assist with reaching prior level of function with activities such as walking, standing and going up/down steps. [] Progressing: [] Met: [] Not Met: [] Adjusted  2. Patient will demonstrate an increase in Strength to at least 4+/5 throughout without pain as well as good proximal hip strength and control to allow for proper functional mobility as indicated by patients Functional Deficits.    []

## 2023-07-10 NOTE — FLOWSHEET NOTE
[address] [employer]    Physical Therapy Treatment Note/ Progress Report:     Date:  07/10/2023    Patient Name:  Donnajean Shone    :  1960  MRN: [id number]    Physician Information:  Ryne Smith MD    Medical Diagnosis:  Hx of right knee surgery [Z98.890]  Hx of left knee surgery [Z98.890]  Treatment Diagnosis:    ICD-10-CM    1. Chronic pain of both knees  M25.561     M25.562     G89.29       2. Bilateral leg weakness  R29.898         Insurance information:   Primary: Payor: Omari Chapal / Plan: BCBS - [place] / Product Type: *No Product type* /    Secondary (if applicable):     Plan of care signed (Y/N): []  Yes [x]  No  Date sent:     Date of Patient follow up with Physician: 23     Progress Report: []  Yes  [x]  No     Functional Scale:     Date assessed:  LEFS 5980; 26% disability   7/10/23    Date Range for reporting period:  Beginnin/10/23  Ending:      Progress report due (10 Rx/or 30 days whichever is less):      Recertification due (POC duration/ or 90 days whichever is less): 23     Visit # Insurance Allowable Auth required? Date Range   1 MN []  Yes  [x]  No      Pain level:  3/10     SUBJECTIVE:  See eval    OBJECTIVE: See eval  Observation:   Test measurements:      RESTRICTIONS/PRECAUTIONS: Hx of B partial knee replacements (L knee 2021, R knee 2021); L knee ablation 2022; cardiomyopathy    Exercises/Interventions:  All exercises performed bilaterally    Therapeutic Ex 20' Resistance Sets/sec Reps Notes          Quad sets w/towel roll  5\" 2x10    SLR flexion  2 10    SLR abduction  2 10    Bridges  2 10    BFR    NPV                                               Therapeutic Activities                                                               Manual Intervention       Knee mobs/PROM       Tib/Fem Mobs       Patella Mobs       Ankle mobs                     NMR re-education

## 2023-07-12 ENCOUNTER — HOSPITAL ENCOUNTER (OUTPATIENT)
Dept: PHYSICAL THERAPY | Age: 63
Setting detail: THERAPIES SERIES
Discharge: HOME | End: 2023-07-12
Payer: COMMERCIAL

## 2023-07-12 PROCEDURE — 97110 THERAPEUTIC EXERCISES: CPT

## 2023-07-12 PROCEDURE — 97112 NEUROMUSCULAR REEDUCATION: CPT

## 2023-07-12 NOTE — FLOWSHEET NOTE
[address] [employer]    Physical Therapy Treatment Note/ Progress Report:     Date:  2023    Patient Name:  Tanya Bal    :  1960  MRN: [id number]    Physician Information:  Laura Katz MD    Medical Diagnosis:  Hx of right knee surgery [Z98.890]  Hx of left knee surgery [Z98.890]  Treatment Diagnosis:   Chronic pain of both knees  M25.561       M25.562       G89.29         2. Bilateral leg weakness  R29.898             Insurance information:   Primary: Payor: Maynor Guzman / Plan: BCBS - [place] / Product Type: *No Product type* /    Secondary (if applicable):     Plan of care signed (Y/N): []  Yes [x]  No  Date sent:     Date of Patient follow up with Physician: 23     Progress Report: []  Yes  [x]  No     Functional Scale:     Date assessed:  LEFS 59/80; 26% disability   7/10/23    Date Range for reporting period:  Beginnin/10/23  Ending:      Progress report due (10 Rx/or 30 days whichever is less):      Recertification due (POC duration/ or 90 days whichever is less): 23     Visit # Insurance Allowable Auth required? Date Range   2 MN []  Yes  [x]  No      Pain level:  3/10     SUBJECTIVE:  Pt 15' late for session. Pt reports still having constant ache above both knees, L>R. Pt reports HEP compliance. OBJECTIVE: See eval  Observation:   Test measurements:      RESTRICTIONS/PRECAUTIONS: Hx of B partial knee replacements (L knee 2021, R knee 2021); L knee ablation 2022; cardiomyopathy    Exercises/Interventions:  All exercises performed bilaterally    Therapeutic Ex 15' Resistance Sets/sec Reps Notes   Bike  5'     Quad sets w/towel roll  5\" 2x10    SLR flexion    W/BFR   SLR abduction  2 10    Bridges  2 10    SAQ    W/BFR                                        Therapeutic Activities 3'              Lateral bandwalking teal 1 3 laps Band around ankles                                             Manual Intervention

## 2023-07-17 ENCOUNTER — HOSPITAL ENCOUNTER (OUTPATIENT)
Dept: PHYSICAL THERAPY | Age: 63
Setting detail: THERAPIES SERIES
Discharge: HOME | End: 2023-07-17
Payer: COMMERCIAL

## 2023-07-17 PROCEDURE — 97112 NEUROMUSCULAR REEDUCATION: CPT

## 2023-07-17 PROCEDURE — 97110 THERAPEUTIC EXERCISES: CPT

## 2023-07-17 PROCEDURE — 97530 THERAPEUTIC ACTIVITIES: CPT

## 2023-07-17 NOTE — FLOWSHEET NOTE
[address] [employer]    Physical Therapy Treatment Note/ Progress Report:     Date:  2023    Patient Name:  Jean Cabezas    :  1960  MRN: [id number]    Physician Information:  Henrique Hartley MD    Medical Diagnosis:  Hx of right knee surgery [Z98.890]  Hx of left knee surgery [Z98.890]  Treatment Diagnosis:   Chronic pain of both knees  M25.561       M25.562       G89.29         2. Bilateral leg weakness  R29.898             Insurance information:   Primary: Payor: Arizona Wolsey / Plan: BCBS - [place] / Product Type: *No Product type* /    Secondary (if applicable):     Plan of care signed (Y/N): []  Yes [x]  No  Date sent:     Date of Patient follow up with Physician: 23     Progress Report: []  Yes  [x]  No     Functional Scale:     Date assessed:  LEFS 59; 26% disability   7/10/23    Date Range for reporting period:  Beginnin/10/23  Ending:      Progress report due (10 Rx/or 30 days whichever is less):      Recertification due (POC duration/ or 90 days whichever is less): 23     Visit # Insurance Allowable Auth required? Date Range   3 MN []  Yes [x]  No      Pain level:  3/10     SUBJECTIVE: States both knees are bothering her today. Does believe the BFR exercises are making her legs stronger. OBJECTIVE: See eval  Observation: Patient struggled to perform all BFR exercises  Test measurements:      RESTRICTIONS/PRECAUTIONS: Hx of B partial knee replacements (L knee 2021, R knee 2021); L knee ablation 2022; cardiomyopathy    Exercises/Interventions:  All exercises performed bilaterally  Therapeutic Ex  15' Resistance Sets/sec Reps Notes   Bike  5'     Quad sets w/towel roll  5\" 2x10    SLR flexion    W/BFR   SLR abduction  2 10    Bridges  2 10    SAQ    W/BFR   Prone quad stretch  30\" x3ea    Machine HS curl 35# 3 x15                  Therapeutic Activities  5'              Lateral bandwalking teal 1 3 laps Band

## 2023-07-19 ENCOUNTER — HOSPITAL ENCOUNTER (OUTPATIENT)
Dept: PHYSICAL THERAPY | Age: 63
Setting detail: THERAPIES SERIES
Discharge: HOME | End: 2023-07-19
Payer: COMMERCIAL

## 2023-07-19 PROCEDURE — 97112 NEUROMUSCULAR REEDUCATION: CPT

## 2023-07-19 PROCEDURE — 20560 NDL INSJ W/O NJX 1 OR 2 MUSC: CPT

## 2023-07-19 PROCEDURE — 97110 THERAPEUTIC EXERCISES: CPT

## 2023-07-19 PROCEDURE — 97032 APPL MODALITY 1+ESTIM EA 15: CPT

## 2023-07-19 NOTE — FLOWSHEET NOTE
[address] [facility] [employer]    Physical Therapy Treatment Note/ Progress Report:     Date:  2023    Patient Name:  Carrie Duncan    :  1960  MRN: [id number]    Physician Information:  Benoit Gordon MD    Medical Diagnosis:  Hx of right knee surgery [Z98.890]  Hx of left knee surgery [Z98.890]  Treatment Diagnosis:   Chronic pain of both knees  M25.561       M25.562       G89.29         2. Bilateral leg weakness  R29.898             Insurance information:   Primary: Payor: Beverley Babinski / Plan: BCBS [place] / Product Type: *No Product type* /    Secondary (if applicable):     Plan of care signed (Y/N): []  Yes [x]  No  Date sent:     Date of Patient follow up with Physician: 23     Progress Report: []  Yes  [x]  No     Functional Scale:     Date assessed:  LEFS 59/80; 26% disability   7/10/23    Date Range for reporting period:  Beginnin/10/23  Ending:      Progress report due (10 Rx/or 30 days whichever is less): 98     Recertification due (POC duration/ or 90 days whichever is less): 23     Visit # Insurance Allowable Auth required? Date Range   4 MN []  Yes [x]  No      Pain level:  3/10     SUBJECTIVE: Pt reports she feels L knee pain is slightly worse, starting to have pain around kneecap when she is walking. Pt reports still having constant ache just above knees with prolonged sitting. OBJECTIVE: See eval  Observation: Patient struggled to perform all BFR exercises  Test measurements:      RESTRICTIONS/PRECAUTIONS: Hx of B partial knee replacements (L knee 2021, R knee 2021); L knee ablation 2022; cardiomyopathy    Exercises/Interventions:  All exercises performed bilaterally  Therapeutic Ex  10' Resistance Sets/sec Reps Notes   Bike  5'     Quad sets w/towel roll  5\" 2x10    SLR flexion    W/BFR   SLR abduction  2 10    Bridges  2 10    SAQ    W/BFR   Prone quad stretch  30\" x3ea    Machine HS curl 35# 3 x15

## 2023-07-24 ENCOUNTER — HOSPITAL ENCOUNTER (OUTPATIENT)
Dept: PHYSICAL THERAPY | Age: 63
Setting detail: THERAPIES SERIES
Discharge: HOME | End: 2023-07-24
Payer: COMMERCIAL

## 2023-07-24 PROCEDURE — 97110 THERAPEUTIC EXERCISES: CPT

## 2023-07-24 PROCEDURE — 20560 NDL INSJ W/O NJX 1 OR 2 MUSC: CPT

## 2023-07-24 PROCEDURE — 97112 NEUROMUSCULAR REEDUCATION: CPT

## 2023-07-24 PROCEDURE — 97032 APPL MODALITY 1+ESTIM EA 15: CPT

## 2023-07-24 NOTE — FLOWSHEET NOTE
[address] [employer]    Physical Therapy Treatment Note/ Progress Report:     Date:  2023    Patient Name:  Katerine Christie    :  1960  MRN: [id number]    Physician Information:  Lala Mitchell MD    Medical Diagnosis:  Hx of right knee surgery [Z98.890]  Hx of left knee surgery [Z98.890]  Treatment Diagnosis:   Chronic pain of both knees  M25.561       M25.562       G89.29         2. Bilateral leg weakness  R29.898             Insurance information:   Primary: Payor: Lyubov Hill / Plan: BC[place] / Product Type: *No Product type* /    Secondary (if applicable):     Plan of care signed (Y/N): []  Yes [x]  No  Date sent:     Date of Patient follow up with Physician: 23     Progress Report: []  Yes  [x]  No     Functional Scale:     Date assessed:  LEFS 59/80; 26% disability   7/10/23    Date Range for reporting period:  Beginnin/10/23  Ending:      Progress report due (10 Rx/or 30 days whichever is less):      Recertification due (POC duration/ or 90 days whichever is less): 23     Visit # Insurance Allowable Auth required? Date Range   5 MN []  Yes [x]  No      Pain level:  310     SUBJECTIVE: Pt reports that her knees felt a little better for about a day after dry needling. Pt reports knees are back to baseline achiness currently. Pt reports that she has continued to have R anterior hip/groin pain that has been present for about 1 month. OBJECTIVE: See eval  Observation: Patient struggled to perform all BFR exercises  Test measurements:      RESTRICTIONS/PRECAUTIONS: Hx of B partial knee replacements (L knee 2021, R knee 2021); L knee ablation 2022; cardiomyopathy    Exercises/Interventions:  All exercises performed bilaterally  Therapeutic Ex  10' Resistance Sets/sec Reps Notes   Bike  5'     Quad sets w/towel roll  5\" 2x10    SLR flexion    W/BFR   SLR abduction  2 10    Bridges  2 10    SAQ    W/BFR   Prone quad

## 2023-07-26 ENCOUNTER — HOSPITAL ENCOUNTER (OUTPATIENT)
Dept: PHYSICAL THERAPY | Age: 63
Setting detail: THERAPIES SERIES
Discharge: HOME | End: 2023-07-26
Payer: COMMERCIAL

## 2023-07-26 PROCEDURE — 20560 NDL INSJ W/O NJX 1 OR 2 MUSC: CPT

## 2023-07-26 PROCEDURE — 97112 NEUROMUSCULAR REEDUCATION: CPT

## 2023-07-26 PROCEDURE — 97110 THERAPEUTIC EXERCISES: CPT

## 2023-07-26 PROCEDURE — 97032 APPL MODALITY 1+ESTIM EA 15: CPT

## 2023-07-26 NOTE — FLOWSHEET NOTE
[address] [facility] [employer]    Physical Therapy Treatment Note/ Progress Report:     Date:  2023    Patient Name:  Mahalia Gaucher    :  1960  MRN: [id number]    Physician Information:  Tayo Whitmore MD    Medical Diagnosis:  Hx of right knee surgery [Z98.890]  Hx of left knee surgery [Z98.890]  Treatment Diagnosis:   Chronic pain of both knees  M25.561       M25.562       G89.29         2. Bilateral leg weakness  R29.898             Insurance information:   Primary: Payor: Jo Mis / Plan: BC[place] / Product Type: *No Product type* /    Secondary (if applicable):     Plan of care signed (Y/N): []  Yes [x]  No  Date sent:     Date of Patient follow up with Physician: 23     Progress Report: []  Yes  [x]  No     Functional Scale:     Date assessed:  LEFS 5980; 26% disability   7/10/23    Date Range for reporting period:  Beginnin/10/23  Ending:      Progress report due (10 Rx/or 30 days whichever is less): 82     Recertification due (POC duration/ or 90 days whichever is less): 23     Visit # Insurance Allowable Auth required? Date Range   6 MN []  Yes [x]  No      Pain level:  3/10     SUBJECTIVE: Pt reports no significant change in pain levels after DN last session. Pt reports mild quad soreness, but dissipated shortly after session. Pt reports R anterior hip/groin pain is slightly improved after holding SLR flexion with BFR. OBJECTIVE: See eval  Observation: Patient struggled to perform all BFR exercises  Test measurements:      RESTRICTIONS/PRECAUTIONS: Hx of B partial knee replacements (L knee 2021, R knee 2021); L knee ablation 2022; cardiomyopathy    Exercises/Interventions:  All exercises performed bilaterally  Therapeutic Ex  10' Resistance Sets/sec Reps Notes   Bike  5'     Quad sets w/towel roll  5\" 2x10    SLR flexion    W/BFR   SLR abduction  2 10    Bridges  2 10    SAQ    W/BFR   Prone quad stretch  30\"

## 2023-07-31 ENCOUNTER — HOSPITAL ENCOUNTER (OUTPATIENT)
Dept: PHYSICAL THERAPY | Age: 63
Setting detail: THERAPIES SERIES
Discharge: HOME | End: 2023-07-31
Payer: COMMERCIAL

## 2023-07-31 PROCEDURE — 97110 THERAPEUTIC EXERCISES: CPT

## 2023-07-31 PROCEDURE — 97112 NEUROMUSCULAR REEDUCATION: CPT

## 2023-07-31 PROCEDURE — 97140 MANUAL THERAPY 1/> REGIONS: CPT

## 2023-07-31 NOTE — FLOWSHEET NOTE
[address] [employer]    Physical Therapy Treatment Note/ Progress Report:     Date:  2023    Patient Name:  Christian Pool    :  1960  MRN: [id number]    Physician Information:  Henrique Staley MD    Medical Diagnosis:  Hx of right knee surgery [Z98.890]  Hx of left knee surgery [Z98.890]  Treatment Diagnosis:   Chronic pain of both knees  M25.561       M25.562       G89.29         2. Bilateral leg weakness  R29.898             Insurance information:   Primary: Payor: [employer] / Plan: [place] / Product Type: *No Product type* /    Secondary (if applicable):     Plan of care signed (Y/N): []  Yes [x]  No  Date sent:     Date of Patient follow up with Physician: 23     Progress Report: []  Yes  [x]  No     Functional Scale:     Date assessed:  LEFS 59/80; 26% disability   7/10/23    Date Range for reporting period:  Beginnin/10/23  Ending:      Progress report due (10 Rx/or 30 days whichever is less):      Recertification due (POC duration/ or 90 days whichever is less): 23     Visit # Insurance Allowable Auth required? Date Range   7 MN []  Yes [x]  No      Pain level:  3/10     SUBJECTIVE: Pt reports no significant change in pain levels after DN last session. Pt reports R anterior hip/groin pain is slightly improved after holding SLR flexion with BFR. OBJECTIVE: See eval  Observation: Patient struggled to perform all BFR exercises  Test measurements:      RESTRICTIONS/PRECAUTIONS: Hx of B partial knee replacements (L knee 2021, R knee 2021); L knee ablation 2022; cardiomyopathy    Exercises/Interventions:  All exercises performed bilaterally  Therapeutic Ex  15' Resistance Sets/sec Reps Notes   Bike  5'     Quad sets w/towel roll  5\" 2x10    SLR flexion    W/BFR   SLR abduction  2 10    Bridges  2 10    SAQ    W/BFR   Prone quad stretch  30\" x3ea    Machine HS curl 35# 3 x10    Retro slider lunges  2 10x ea

## 2023-08-01 ENCOUNTER — TELEPHONE (OUTPATIENT)
Dept: FAMILY MEDICINE CLINIC | Age: 63
End: 2023-08-01

## 2023-08-01 NOTE — TELEPHONE ENCOUNTER
REASON FOR REQUEST: MISSING/ILLEGIBLE INFORMATION ON RX - SIG CODE: INSURANCE WILL ONLY COVER 90  DAYS    Medication and Quantity requested: MAGNESIUM-POTASSIUM PO [[id number]]        Last Visit  6/26/23    Pharmacy and phone number updated in [facility]:  yes

## 2023-08-02 ENCOUNTER — HOSPITAL ENCOUNTER (OUTPATIENT)
Dept: PHYSICAL THERAPY | Age: 63
Setting detail: THERAPIES SERIES
Discharge: HOME | End: 2023-08-02

## 2023-08-02 NOTE — FLOWSHEET NOTE
[address] [facility] [employer] [facility]    Physical Therapy  Cancellation/No-show Note  Patient Name:  Bobo Díaz  :  1960   Date:  2023  Cancelled visits to date: 1  No-shows to date: 0    For today's appointment patient:  [x]  Cancelled  []  Rescheduled appointment  []  No-show     Reason given by patient:  [x]  Patient ill  []  Conflicting appointment  []  No transportation    []  Conflict with work  []  No reason given  []  Other:     Comments:      Phone call information:   []  Phone call made today to patient at _ time at number provided:      []  Patient answered, conversation as follows:    []  Patient did not answer, message left as follows:  []  Phone call not made today  [x]  Phone call not needed - pt contacted us to cancel and provided reason for cancellation.      Electronically signed by:  Aiden Diaz, PT, DPT

## 2023-08-03 RX ORDER — MELOXICAM 15 MG/1
TABLET ORAL
Qty: 30 TABLET | Refills: 0 | OUTPATIENT
Start: 2023-08-03

## 2023-08-03 NOTE — TELEPHONE ENCOUNTER
[facility] sent another fax but the medication is       ESOMEPRAZOLE MAGNESIUM 40MG ORAL CAPSULE DELAYED RELEASE    ( I believe that RX is what this encounter was supposed to say)     QTY: 90     Missing / Illegible information on RX -SIG    CODE: insurance will only cover 90 in 365 days     Form scanned into this encounter for reference       Please advise

## 2023-08-03 NOTE — TELEPHONE ENCOUNTER
I spoke with the pt asking about about the refill request. The pt stated that the request must of come from the Pharmacy.  Pt stated that it did not help her at all so we cancel the refill request

## 2023-08-07 ENCOUNTER — HOSPITAL ENCOUNTER (OUTPATIENT)
Dept: PHYSICAL THERAPY | Age: 63
Setting detail: THERAPIES SERIES
Discharge: HOME | End: 2023-08-07
Payer: COMMERCIAL

## 2023-08-07 PROCEDURE — 97112 NEUROMUSCULAR REEDUCATION: CPT

## 2023-08-07 PROCEDURE — 20560 NDL INSJ W/O NJX 1 OR 2 MUSC: CPT

## 2023-08-07 PROCEDURE — 97032 APPL MODALITY 1+ESTIM EA 15: CPT

## 2023-08-07 PROCEDURE — 97110 THERAPEUTIC EXERCISES: CPT

## 2023-08-07 NOTE — FLOWSHEET NOTE
[address] [employer]    Physical Therapy Treatment Note/ Progress Report:     Date:  2023    Patient Name:  Vik Palacio    :  1960  MRN: [id number]    Physician Information:  Eliz Leblanc MD    Medical Diagnosis:  Hx of right knee surgery [Z98.890]  Hx of left knee surgery [Z98.890]  Treatment Diagnosis:   Chronic pain of both knees  M25.561       M25.562       G89.29         2. Bilateral leg weakness  R29.898             Insurance information:   Primary: Payor: Mayur Sessions / Plan: BC[place] / Product Type: *No Product type* /    Secondary (if applicable):     Plan of care signed (Y/N): []  Yes [x]  No  Date sent:     Date of Patient follow up with Physician: 23     Progress Report: []  Yes  [x]  No     Functional Scale:     Date assessed:  LEFS 59/80; 26% disability   7/10/23    Date Range for reporting period:  Beginnin/10/23  Ending:      Progress report due (10 Rx/or 30 days whichever is less): 3/50/33     Recertification due (POC duration/ or 90 days whichever is less): 23     Visit # Insurance Allowable Auth required? Date Range   8 MN []  Yes [x]  No      Pain level:  3/10     SUBJECTIVE: Pt reports having no significant changes in pain levels with cupping. Pt reports she moved houses this past weekend and knees were not as sore as she expected. No significant change in overall pain levels since starting PT. No R anterior hip/groin pain in the last week. OBJECTIVE: See eval  Observation: Patient struggled to perform all BFR exercises  Test measurements:      RESTRICTIONS/PRECAUTIONS: Hx of B partial knee replacements (L knee 2021, R knee 2021); L knee ablation 2022; cardiomyopathy    Exercises/Interventions:  All exercises performed bilaterally  Therapeutic Ex  10' Resistance Sets/sec Reps Notes   Bike  5'     Quad sets w/towel roll  5\" 2x10    SLR flexion    W/BFR   SLR abduction  2 10    Bridges  2 10    SAQ

## 2023-08-09 ENCOUNTER — HOSPITAL ENCOUNTER (OUTPATIENT)
Dept: PHYSICAL THERAPY | Age: 63
Setting detail: THERAPIES SERIES
Discharge: HOME | End: 2023-08-09
Payer: COMMERCIAL

## 2023-08-09 PROCEDURE — 97110 THERAPEUTIC EXERCISES: CPT

## 2023-08-09 PROCEDURE — 97032 APPL MODALITY 1+ESTIM EA 15: CPT

## 2023-08-09 PROCEDURE — 97112 NEUROMUSCULAR REEDUCATION: CPT

## 2023-08-09 PROCEDURE — 20560 NDL INSJ W/O NJX 1 OR 2 MUSC: CPT

## 2023-08-09 NOTE — FLOWSHEET NOTE
[address] [facility] [employer]    Physical Therapy Treatment Note/ Progress Report:     Date:  2023    Patient Name:  Veronica Lau    :  1960  MRN: [id number]    Physician Information:  Sonya Shaikh MD    Medical Diagnosis:  Hx of right knee surgery [Z98.890]  Hx of left knee surgery [Z98.890]  Treatment Diagnosis:   Chronic pain of both knees  M25.561       M25.562       G89.29         2. Bilateral leg weakness  R29.898             Insurance information:   Primary: Payor: Jere Valle / Plan: BCBS [place] / Product Type: *No Product type* /    Secondary (if applicable):     Plan of care signed (Y/N): []  Yes [x]  No  Date sent:     Date of Patient follow up with Physician: 23     Progress Report: []  Yes  [x]  No     Functional Scale:     Date assessed:  LEFS 59/80; 26% disability   7/10/23    Date Range for reporting period:  Beginnin/10/23  Ending:      Progress report due (10 Rx/or 30 days whichever is less): 3/41/41     Recertification due (POC duration/ or 90 days whichever is less): 23     Visit # Insurance Allowable Auth required? Date Range   9 MN []  Yes [x]  No      Pain level:  3/10     SUBJECTIVE: Pt reports that her knees are achy and sore today, unsure why. Pt reports that her knees felt better after dry needling last session for the rest of the evening, but usual achiness returned the following day. OBJECTIVE: See eval  Observation: Patient struggled to perform all BFR exercises  Test measurements:      RESTRICTIONS/PRECAUTIONS: Hx of B partial knee replacements (L knee 2021, R knee 2021); L knee ablation 2022; cardiomyopathy    Exercises/Interventions:  All exercises performed bilaterally  Therapeutic Ex  10' Resistance Sets/sec Reps Notes   Bike  5'     Quad sets w/towel roll  5\" 2x10    SLR flexion    W/BFR   SLR abduction  2 10    Bridges  2 10    SAQ    W/BFR   Prone quad stretch  30\" x3ea    Machine HS curl 35# 3

## 2023-08-14 ENCOUNTER — HOSPITAL ENCOUNTER (OUTPATIENT)
Dept: PHYSICAL THERAPY | Age: 63
Setting detail: THERAPIES SERIES
Discharge: HOME | End: 2023-08-14
Payer: COMMERCIAL

## 2023-08-14 NOTE — FLOWSHEET NOTE
[address] [facility]    Physical Therapy  Cancellation/No-show Note  Patient Name:  Veronica Lau  :  1960   Date:  2023  Cancelled visits to date: 0  No-shows to date: 1    For today's appointment patient:  []  Cancelled  []  Rescheduled appointment  [x]  No-show     Reason given by patient:  []  Patient ill  []  Conflicting appointment  []  No transportation    []  Conflict with work  []  No reason given  [x]  Other:  Pt thought her appt time was at 3:30 instead of 10:30 today. Comments:      Phone call information:   [x]  Phone call made today to patient at _ time at number provided:      []  Patient answered, conversation as follows:    [x]  Patient did not answer, message left as follows: Pt thought appt time was at 3:30 instead of 10:30 today  []  Phone call not made today  []  Phone call not needed - pt contacted us to cancel and provided reason for cancellation.      Electronically signed by:  Ara Moncada, PT, DPT

## 2023-08-16 ENCOUNTER — HOSPITAL ENCOUNTER (OUTPATIENT)
Dept: PHYSICAL THERAPY | Age: 63
Setting detail: THERAPIES SERIES
Discharge: HOME | End: 2023-08-16
Payer: COMMERCIAL

## 2023-08-16 PROCEDURE — 20560 NDL INSJ W/O NJX 1 OR 2 MUSC: CPT

## 2023-08-16 PROCEDURE — 97032 APPL MODALITY 1+ESTIM EA 15: CPT

## 2023-08-16 PROCEDURE — 97110 THERAPEUTIC EXERCISES: CPT

## 2023-08-16 PROCEDURE — 97112 NEUROMUSCULAR REEDUCATION: CPT

## 2023-08-16 NOTE — FLOWSHEET NOTE
[address] [facility] [employer]    Physical Therapy Treatment Note/ Progress Report:     Date:  2023    Patient Name:  Veronica Lau    :  1960  MRN: [id number]    Physician Information:  Sonya Shaikh MD    Medical Diagnosis:  Hx of right knee surgery [Z98.890]  Hx of left knee surgery [Z98.890]  Treatment Diagnosis:   Chronic pain of both knees  M25.561       M25.562       G89.29         2. Bilateral leg weakness  R29.898             Insurance information:   Primary: Payor: Jere Valle / Plan: BCBS [place] / Product Type: *No Product type* /    Secondary (if applicable):     Plan of care signed (Y/N): []  Yes [x]  No  Date sent:     Date of Patient follow up with Physician: 23     Progress Report: []  Yes  [x]  No     Functional Scale:     Date assessed:  LEFS 59/80; 26% disability   7/10/23    Date Range for reporting period:  Beginnin/10/23  Ending:      Progress report due (10 Rx/or 30 days whichever is less): 57     Recertification due (POC duration/ or 90 days whichever is less): 23     Visit # Insurance Allowable Auth required? Date Range   10 MN []  Yes [x]  No      Pain level:  3/10     SUBJECTIVE:  Pt reports that her knees are still sore and achy. Pt reports that she plans to make F/U with  [facility] [place] for the next week or 2. OBJECTIVE: See eval  Observation: Patient struggled to perform all BFR exercises  Test measurements:      RESTRICTIONS/PRECAUTIONS: Hx of B partial knee replacements (L knee 2021, R knee 2021); L knee ablation 2022; cardiomyopathy    Exercises/Interventions:  All exercises performed bilaterally  Therapeutic Ex  10' Resistance Sets/sec Reps Notes   Bike  5'     Quad sets w/towel roll  5\" 2x10    SLR flexion    W/BFR   SLR abduction  2 10    Bridges  2 10    SAQ    W/BFR   Prone quad stretch  30\" x3ea    Machine HS curl 35# 3 x10    Retro slider lunges  2 10x ea    LSD 2\" 2 10           Therapeutic

## 2023-08-21 ENCOUNTER — HOSPITAL ENCOUNTER (OUTPATIENT)
Dept: PHYSICAL THERAPY | Age: 63
Setting detail: THERAPIES SERIES
Discharge: HOME | End: 2023-08-21
Payer: COMMERCIAL

## 2023-08-21 PROCEDURE — 97032 APPL MODALITY 1+ESTIM EA 15: CPT

## 2023-08-21 PROCEDURE — 97110 THERAPEUTIC EXERCISES: CPT

## 2023-08-21 PROCEDURE — 97112 NEUROMUSCULAR REEDUCATION: CPT

## 2023-08-21 PROCEDURE — 20560 NDL INSJ W/O NJX 1 OR 2 MUSC: CPT

## 2023-08-21 NOTE — FLOWSHEET NOTE
[address] [facility] [employer]    Physical Therapy Treatment Note/ Progress Report:     Date:  2023    Patient Name:  Zay Marino    :  1960  MRN: [id number]    Physician Information:  Lucrecia Bazzi MD    Medical Diagnosis:  Hx of right knee surgery [Z98.890]  Hx of left knee surgery [Z98.890]  Treatment Diagnosis:   Chronic pain of both knees  M25.561       M25.562       G89.29         2. Bilateral leg weakness  R29.898             Insurance information:   Primary: Payor: Kaity Marge / Plan: BC[place] / Product Type: *No Product type* /    Secondary (if applicable):     Plan of care signed (Y/N): []  Yes [x]  No  Date sent:     Date of Patient follow up with Physician: 23     Progress Report: []  Yes  [x]  No     Functional Scale:     Date assessed:  LEFS 59/80; 26% disability   7/10/23    Date Range for reporting period:  Beginnin/10/23  Ending:      Progress report due (10 Rx/or 30 days whichever is less): 3/67/89     Recertification due (POC duration/ or 90 days whichever is less): 23     Visit # Insurance Allowable Auth required? Date Range   11 [place] []  Yes [x]  No      Pain level:  3/10     SUBJECTIVE:  Pt reports that her knees feel less achy today. Pt reports that she is unpacking a lot of boxes from moving and thighs are a little sore as a result. F/U with  [facility] [place] . OBJECTIVE: See eval  Observation: Patient struggled to perform all BFR exercises  Test measurements:      RESTRICTIONS/PRECAUTIONS: Hx of B partial knee replacements (L knee 2021, R knee 2021); L knee ablation 2022; cardiomyopathy    Exercises/Interventions:  All exercises performed bilaterally  Therapeutic Ex  10' Resistance Sets/sec Reps Notes   Bike  5'     Quad sets w/towel roll  5\" 2x10    SLR flexion    W/BFR   SLR abduction  2 10    Bridges  2 10    SAQ    W/BFR   Prone quad stretch  30\" x3ea    Machine HS curl 35# 3 x10    Retro slider lunges  2

## 2023-08-23 ENCOUNTER — HOSPITAL ENCOUNTER (OUTPATIENT)
Dept: PHYSICAL THERAPY | Age: 63
Setting detail: THERAPIES SERIES
Discharge: HOME | End: 2023-08-23
Payer: COMMERCIAL

## 2023-08-23 DIAGNOSIS — K21.9 GASTROESOPHAGEAL REFLUX DISEASE, UNSPECIFIED WHETHER ESOPHAGITIS PRESENT: ICD-10-CM

## 2023-08-23 DIAGNOSIS — M25.561 CHRONIC PAIN OF RIGHT KNEE: ICD-10-CM

## 2023-08-23 DIAGNOSIS — G89.29 CHRONIC PAIN OF RIGHT KNEE: ICD-10-CM

## 2023-08-23 PROCEDURE — 97032 APPL MODALITY 1+ESTIM EA 15: CPT

## 2023-08-23 PROCEDURE — 20560 NDL INSJ W/O NJX 1 OR 2 MUSC: CPT

## 2023-08-23 PROCEDURE — 97112 NEUROMUSCULAR REEDUCATION: CPT

## 2023-08-23 PROCEDURE — 97110 THERAPEUTIC EXERCISES: CPT

## 2023-08-23 RX ORDER — PREGABALIN 150 MG/1
150 CAPSULE ORAL NIGHTLY
Qty: 90 CAPSULE | Refills: 1 | Status: SHIPPED | OUTPATIENT
Start: 2023-08-23 | End: 2024-02-19

## 2023-08-23 RX ORDER — PANTOPRAZOLE SODIUM 40 MG/1
40 TABLET, DELAYED RELEASE ORAL
Qty: 90 TABLET | Refills: 1 | Status: SHIPPED | OUTPATIENT
Start: 2023-08-23

## 2023-08-23 NOTE — TELEPHONE ENCOUNTER
Medication:   Requested Prescriptions     Pending Prescriptions Disp Refills    pantoprazole (PROTONIX) 40 MG tablet 90 tablet 1     Sig: Take 1 tablet by mouth every morning (before breakfast)    pregabalin (LYRICA) 150 MG capsule 90 capsule 1     Sig: Take 1 capsule by mouth nightly for 180 days. Last appt: 6/26/2023   Next appt: 9/7/2023    Last OARRS:   RX Monitoring 11/15/2018   Attestation The Prescription Monitoring Report for this patient was reviewed today. Periodic Controlled Substance Monitoring Possible medication side effects, risk of tolerance/dependence & alternative treatments discussed. ;No signs of potential drug abuse or diversion identified.

## 2023-08-23 NOTE — TELEPHONE ENCOUNTER
----- Message from Melinda Martinez sent at 8/23/2023 11:58 AM EDT -----  Subject: Refill Request    QUESTIONS  Name of Medication? pantoprazole (PROTONIX) 40 MG tablet  Patient-reported dosage and instructions? 1 a day  How many days do you have left? 0  Preferred Pharmacy? [id number]  Pharmacy phone number (if available)? [phone]  ---------------------------------------------------------------------------  --------------,  Name of Medication? pregabalin (LYRICA) 150 MG capsule  Patient-reported dosage and instructions? 1 a day  How many days do you have left? 7  Preferred Pharmacy? [id number]  Pharmacy phone number (if available)? [phone]  Additional Information for Provider? Dianne Solares has an appointment scheduled   for 09/07/2023 VV at 1:30 pm and will be needing a refill of her   medications sent to [address] [place] [address] she can be reached at [phone] ok to   leave a message  ---------------------------------------------------------------------------  --------------  [address] [place]  What is the best way for the office to contact you? OK to leave message on   voicemail  Preferred Call Back Phone Number? [phone]  ---------------------------------------------------------------------------  --------------  SCRIPT ANSWERS  Relationship to Patient?  Self

## 2023-08-23 NOTE — FLOWSHEET NOTE
assist with reaching prior level of function with activities such as walking, standing and going up/down steps. [] Progressing: [] Met: [] Not Met: [] Adjusted  2. Patient will demonstrate an increase in Strength to at least 4+/5 throughout without pain as well as good proximal hip strength and control to allow for proper functional mobility as indicated by patients Functional Deficits. [] Progressing: [] Met: [] Not Met: [] Adjusted  3. Patient will return to walking up and down 1 flight of steps without increased symptoms or restriction. [] Progressing: [] Met: [] Not Met: [] Adjusted  4. Patient will be able to sit for 30 minutes without increased symptoms or restriction. [] Progressing: [] Met: [] Not Met: [] Adjusted             ASSESSMENT:  Decreased sxs for longer time after last session, so dry needling was continued. Held IASTM due to pt time restrictions. Pt continues to be very fatigued with BFR exercises, especially knee extension, but was able to complete all reps on L LE today. Pt was able to increase weight with SL LP with BFR today. Held exercise progression due to time restrictions. Patient should continue to benefit from skilled therapy to address pain, improve stairs and overall function. Treatment/Activity Tolerance:  [x] Patient tolerated treatment well [] Patient limited by fatique  [] Patient limited by pain  [] Patient limited by other medical complications  [] Other:     Overall Progression Towards Functional goals/ Treatment Progress Update:  [] Patient is progressing as expected towards functional goals listed. [] Progression is slowed due to complexities/Impairments listed. [] Progression has been slowed due to co-morbidities.   [x] Plan just implemented, too soon to assess goals progression <30days   [] Goals require adjustment due to lack of progress  [] Patient is not progressing as expected and requires additional follow up with physician  [] Other    Prognosis for

## 2023-08-28 ENCOUNTER — TELEPHONE (OUTPATIENT)
Dept: FAMILY MEDICINE CLINIC | Age: 63
End: 2023-08-28

## 2023-09-05 ENCOUNTER — HOSPITAL ENCOUNTER (OUTPATIENT)
Dept: PHYSICAL THERAPY | Age: 63
Setting detail: THERAPIES SERIES
Discharge: HOME | End: 2023-09-05
Payer: COMMERCIAL

## 2023-09-05 PROCEDURE — 97140 MANUAL THERAPY 1/> REGIONS: CPT

## 2023-09-05 NOTE — FLOWSHEET NOTE
progress  [] Patient is not progressing as expected and requires additional follow up with physician  [] Other    Prognosis for POC: [x] Good [] Fair  [] Poor    Patient requires continued skilled intervention: [x] Yes  [] No    Return to Play: (if applicable)   []  Stage 1: Intro to Strength   []  Stage 2: Return to Run and Strength   []  Stage 3: Return to Jump and Strength   []  Stage 4: Dynamic Strength and Agility   []  Stage 5: Sport Specific Training     []  Ready to Return to Play, Meets All Above Stages   []  Not Ready for Return to Sports   Comments:              PLAN: Progress strength. Continue to assess tolerance to BFR and dry needling. Pt has F/U with Dr. Alice Juan 9/14. [x] Continue per plan of care [] Alter current plan (see comments)  [] Plan of care initiated [] Hold pending MD visit [] Discharge    Electronically signed by: Sukumar Oliva PT    Note: If patient does not return for scheduled/recommended follow up visits, this note will serve as a discharge from care along with the most recent update on progress.

## 2023-09-07 ENCOUNTER — TELEMEDICINE (OUTPATIENT)
Dept: FAMILY MEDICINE CLINIC | Age: 63
End: 2023-09-07
Payer: COMMERCIAL

## 2023-09-07 ENCOUNTER — HOSPITAL ENCOUNTER (OUTPATIENT)
Dept: PHYSICAL THERAPY | Age: 63
Setting detail: THERAPIES SERIES
Discharge: HOME | End: 2023-09-07
Payer: COMMERCIAL

## 2023-09-07 DIAGNOSIS — Z80.3 FAMILY HISTORY OF BREAST CANCER: ICD-10-CM

## 2023-09-07 DIAGNOSIS — G89.29 CHRONIC PAIN OF BOTH KNEES: Primary | ICD-10-CM

## 2023-09-07 DIAGNOSIS — M25.562 CHRONIC PAIN OF BOTH KNEES: Primary | ICD-10-CM

## 2023-09-07 DIAGNOSIS — K21.9 GASTROESOPHAGEAL REFLUX DISEASE, UNSPECIFIED WHETHER ESOPHAGITIS PRESENT: ICD-10-CM

## 2023-09-07 DIAGNOSIS — M25.561 CHRONIC PAIN OF BOTH KNEES: Primary | ICD-10-CM

## 2023-09-07 PROCEDURE — 99213 OFFICE O/P EST LOW 20 MIN: CPT | Performed by: NURSE PRACTITIONER

## 2023-09-07 PROCEDURE — 97110 THERAPEUTIC EXERCISES: CPT

## 2023-09-07 PROCEDURE — 97112 NEUROMUSCULAR REEDUCATION: CPT

## 2023-09-07 RX ORDER — PREGABALIN 150 MG/1
150 CAPSULE ORAL NIGHTLY
Qty: 90 CAPSULE | Refills: 1 | Status: CANCELLED | OUTPATIENT
Start: 2023-09-07 | End: 2024-03-05

## 2023-09-07 RX ORDER — PANTOPRAZOLE SODIUM 40 MG/1
40 TABLET, DELAYED RELEASE ORAL
Qty: 90 TABLET | Refills: 1 | Status: SHIPPED | OUTPATIENT
Start: 2023-09-07

## 2023-09-07 RX ORDER — PREGABALIN 75 MG/1
75 CAPSULE ORAL NIGHTLY
Qty: 30 CAPSULE | Refills: 0 | Status: SHIPPED | OUTPATIENT
Start: 2023-09-07 | End: 2023-10-07

## 2023-09-07 ASSESSMENT — PATIENT HEALTH QUESTIONNAIRE - PHQ9
9. THOUGHTS THAT YOU WOULD BE BETTER OFF DEAD, OR OF HURTING YOURSELF: 0
SUM OF ALL RESPONSES TO PHQ QUESTIONS 1-9: 2
3. TROUBLE FALLING OR STAYING ASLEEP: 0
10. IF YOU CHECKED OFF ANY PROBLEMS, HOW DIFFICULT HAVE THESE PROBLEMS MADE IT FOR YOU TO DO YOUR WORK, TAKE CARE OF THINGS AT HOME, OR GET ALONG WITH OTHER PEOPLE: 0
SUM OF ALL RESPONSES TO PHQ QUESTIONS 1-9: 2
5. POOR APPETITE OR OVEREATING: 0
4. FEELING TIRED OR HAVING LITTLE ENERGY: 0
7. TROUBLE CONCENTRATING ON THINGS, SUCH AS READING THE NEWSPAPER OR WATCHING TELEVISION: 0
SUM OF ALL RESPONSES TO PHQ QUESTIONS 1-9: 2
1. LITTLE INTEREST OR PLEASURE IN DOING THINGS: 1
SUM OF ALL RESPONSES TO PHQ QUESTIONS 1-9: 2
SUM OF ALL RESPONSES TO PHQ9 QUESTIONS 1 & 2: 2
2. FEELING DOWN, DEPRESSED OR HOPELESS: 1
8. MOVING OR SPEAKING SO SLOWLY THAT OTHER PEOPLE COULD HAVE NOTICED. OR THE OPPOSITE, BEING SO FIGETY OR RESTLESS THAT YOU HAVE BEEN MOVING AROUND A LOT MORE THAN USUAL: 0
6. FEELING BAD ABOUT YOURSELF - OR THAT YOU ARE A FAILURE OR HAVE LET YOURSELF OR YOUR FAMILY DOWN: 0

## 2023-09-07 ASSESSMENT — ENCOUNTER SYMPTOMS
ABDOMINAL PAIN: 0
VOMITING: 0
CONSTIPATION: 0
NAUSEA: 0
DIARRHEA: 0

## 2023-09-07 NOTE — ASSESSMENT & PLAN NOTE
Improving   Continue PT  Decrease Lyrica to 75 mg nightly x 1 month, then 50 mg nightly x 1 month, then stop

## 2023-09-07 NOTE — PLAN OF CARE
and going up/down steps. [x] Progressing: [] Met: [] Not Met: [] Adjusted  2. Patient will demonstrate an increase in Strength to at least 4+/5 throughout without pain as well as good proximal hip strength and control to allow for proper functional mobility as indicated by patients Functional Deficits. [x] Progressing: [] Met: [] Not Met: [] Adjusted  3. Patient will return to walking up and down 1 flight of steps without increased symptoms or restriction. [x] Progressing: [] Met: [] Not Met: [] Adjusted  4. Patient will be able to sit for 30 minutes without increased symptoms or restriction. [x] Progressing: [] Met: [] Not Met: [] Adjusted             ASSESSMENT:  Pt continues to be very fatigued with BFR. Focused on BFR and functional strengthening today with fatigue noted, but no increased knee pain. Pt requires cues with exercises to decrease dynamic valgus collapse and for appropriate glut activation. Patient should continue to benefit from skilled therapy to address pain, improve stairs and overall function. Treatment/Activity Tolerance:  [x] Patient tolerated treatment well [] Patient limited by fatique  [] Patient limited by pain  [] Patient limited by other medical complications  [] Other:     Overall Progression Towards Functional goals/ Treatment Progress Update:  [x] Patient is progressing as expected towards functional goals listed. [] Progression is slowed due to complexities/Impairments listed. [] Progression has been slowed due to co-morbidities.   [] Plan just implemented, too soon to assess goals progression <30days   [] Goals require adjustment due to lack of progress  [] Patient is not progressing as expected and requires additional follow up with physician  [] Other    Prognosis for POC: [x] Good [] Fair  [] Poor    Patient requires continued skilled intervention: [x] Yes  [] No    Return to Play: (if applicable)   []  Stage 1: Intro to Strength   []  Stage 2: Return to Run and

## 2023-09-07 NOTE — PROGRESS NOTES
constipation, diarrhea, nausea and vomiting. Musculoskeletal:  Positive for arthralgias (B knee pain). Physical Exam:  Constitutional: Appears well-developed and well-nourished. No apparent distress    Mental status: Alert and awake. Oriented to person/place/time. Able to follow commands    Eyes: EOM normal. Sclera normal. No discharge visible  HENT: Normocephalic, atraumatic. Mouth/Throat: Mucous membranes are moist. External Ears Normal    Neck: No visualized mass   Pulmonary/Chest: Respiratory effort normal.  No visualized signs of difficulty breathing or respiratory distress        Musculoskeletal:  Normal range of motion of neck  Neurological:       No Facial Asymmetry (Cranial nerve 7 motor function) (limited exam to video visit). No gaze palsy       Skin:  No significant exanthematous lesions or discoloration noted on facial skin       Psychiatric: Normal Affect. No Hallucinations        Giancarlo Dooley was evaluated through a synchronous (real-time) audio-video encounter. The patient (or guardian if applicable) is aware that this is a billable service, which includes applicable co-pays. This Virtual Visit was conducted with patient's (and/or legal guardian's) consent. Patient identification was verified, and a caregiver was present when appropriate. The patient was located at Home: [address] [facility]  [id number]  Provider was located at [facility] (Appt Dept): Yomaira Lainez APRN - CNP on 9/7/2023 at 4:19 PM    An electronic signature was used to authenticate this note.

## 2023-09-18 DIAGNOSIS — E78.00 PURE HYPERCHOLESTEROLEMIA: ICD-10-CM

## 2023-09-18 RX ORDER — ROSUVASTATIN CALCIUM 10 MG/1
10 TABLET, COATED ORAL NIGHTLY
Qty: 90 TABLET | Refills: 1 | Status: SHIPPED | OUTPATIENT
Start: 2023-09-18 | End: 2024-03-16

## 2023-09-18 NOTE — TELEPHONE ENCOUNTER
Medication:   Requested Prescriptions     Pending Prescriptions Disp Refills    rosuvastatin (CRESTOR) 10 MG tablet [Pharmacy Med Name: ROSUVASTATIN CALCIUM 10 MG TAB] 90 tablet 1     Sig: TAKE 1 TABLET BY MOUTH NIGHTLY       Last Filled:      Patient Phone Number: [phone] (home) [phone] (work)    Last appt: 9/7/2023   Next appt: Visit date not found    Last Lipid:   Lab Results   Component Value Date/Time    CHOL 201 10/16/2015 11:14 AM    TRIG 57 10/16/2015 11:14 AM    HDL 69 05/17/2023 11:20 AM    HDL 56 03/13/2012 08:58 AM    LDLCALC 145 05/17/2023 11:20 AM

## 2023-09-21 ENCOUNTER — OFFICE VISIT (OUTPATIENT)
Dept: ORTHOPEDIC SURGERY | Age: 63
End: 2023-09-21
Payer: COMMERCIAL

## 2023-09-21 VITALS — WEIGHT: 155 LBS | BODY MASS INDEX: 22.19 KG/M2 | HEIGHT: 70 IN

## 2023-09-21 DIAGNOSIS — Z96.659 PAIN IN KNEE REGION AFTER REPLACEMENT OF KNEE JOINT: Primary | ICD-10-CM

## 2023-09-21 DIAGNOSIS — M25.569 PAIN IN KNEE REGION AFTER REPLACEMENT OF KNEE JOINT: Primary | ICD-10-CM

## 2023-09-21 PROCEDURE — 99213 OFFICE O/P EST LOW 20 MIN: CPT | Performed by: ORTHOPAEDIC SURGERY

## 2023-09-21 NOTE — PROGRESS NOTES
Patient: Kwame Maldonado  : 1960    MRN: [id number]    Date of Visit: 23    Attending Physician: Ryne Smith MD    History of Present Illness  Ms. Jeaneen Cushing is a very pleasant 59 y.o. patient with a history of B/L PFJ arthroplasty. She reports that she never really did well after either patellofemoral arthroplasty and continued to have anterior knee pain worse with increased activities she does describe soreness with going up and down stairs and she points to the anterior knee. She denies medial or lateral joint line pain. I did send her for BFR and extensive quad strengthening. She reports she is doing much better at this point.     PMH/PSH:  Past Medical History:   Diagnosis Date    Alcoholism ([address])     Recovering    Cardiomyopathy ([address])         Chronic left-sided low back pain without sciatica 2020    Degenerative joint disease     Depression     Fibrocystic disease of breast 06/15/2010    GERD (gastroesophageal reflux disease)     Headache(784.0)     Hemorrhoid 06/15/2010    Hyperlipidemia 2019    Mitral valve regurgitation     Osteoporosis     Screening mammogram, encounter for 2018    Dr Shiraz Alfred MD    Urinary incontinence     Vitamin D deficiency 2012     Patient Active Problem List   Diagnosis    Hemorrhoid    Nevus    Lipid disorder    Fibrocystic disease of breast    Acne necrotica    Depression    GERD (gastroesophageal reflux disease)    Vitamin D deficiency    Postmenopausal osteoporosis    Chronic pain of both knees    Hyperlipidemia    Chronic pain of both shoulders    Chronic left-sided low back pain without sciatica    NSTEMI (non-ST elevated myocardial infarction) ([address])    Secondary cardiomyopathy ([address])    History of colonic polyps    Primary osteoarthritis of left knee    Mild episode of recurrent major depressive disorder [facility])     Past Surgical History:   Procedure Laterality Date    APPENDECTOMY  2021    BLADDER SURGERY      with

## 2023-10-09 DIAGNOSIS — K21.9 GASTROESOPHAGEAL REFLUX DISEASE, UNSPECIFIED WHETHER ESOPHAGITIS PRESENT: ICD-10-CM

## 2023-10-09 RX ORDER — DEXLANSOPRAZOLE 30 MG/1
30 CAPSULE, DELAYED RELEASE ORAL DAILY
Qty: 90 CAPSULE | Refills: 3 | OUTPATIENT
Start: 2023-10-09

## 2023-10-09 NOTE — TELEPHONE ENCOUNTER
Medication:   Requested Prescriptions     Pending Prescriptions Disp Refills    dexlansoprazole (DEXILANT) 30 MG CPDR delayed release capsule 90 capsule 3     Sig: Take 30 mg by mouth daily        Last Filled:  6/24/2022, 90, 3    Patient Phone Number: [phone] (home) [phone] (work)    Last appt: 9/7/2023   Next appt: Visit date not found    Last OARRS:       8/23/2023     2:08 PM   RX Monitoring   Periodic Controlled Substance Monitoring No signs of potential drug abuse or diversion identified.

## 2023-10-09 NOTE — TELEPHONE ENCOUNTER
Pt is having heart burns really bad and wants a prescription for DEXLANSOPRAZOLE called into the pharmacy

## 2023-11-13 DIAGNOSIS — G89.29 CHRONIC PAIN OF BOTH KNEES: ICD-10-CM

## 2023-11-13 DIAGNOSIS — M25.562 CHRONIC PAIN OF BOTH KNEES: ICD-10-CM

## 2023-11-13 DIAGNOSIS — M25.561 CHRONIC PAIN OF BOTH KNEES: ICD-10-CM

## 2023-11-13 RX ORDER — PREGABALIN 75 MG/1
75 CAPSULE ORAL NIGHTLY
Qty: 30 CAPSULE | Refills: 5 | Status: SHIPPED | OUTPATIENT
Start: 2023-11-13 | End: 2024-05-11

## 2023-11-13 NOTE — TELEPHONE ENCOUNTER
Medication:   Requested Prescriptions     Pending Prescriptions Disp Refills    pregabalin (LYRICA) 75 MG capsule [Pharmacy Med Name: PREGABALIN 75 MG CAPSULE] 30 capsule 0     Sig: Take 1 capsule by mouth at bedtime for 30 days. Max Daily Amount: 75 mg        Last Filled:  9/7/2023    Patient Phone Number: [phone] (home) [phone] (work)    Last appt: 9/7/2023   Next appt: Visit date not found    Last OARRS:       8/23/2023     2:08 PM   RX Monitoring   Periodic Controlled Substance Monitoring No signs of potential drug abuse or diversion identified.

## 2023-11-28 ENCOUNTER — TELEPHONE (OUTPATIENT)
Dept: FAMILY MEDICINE CLINIC | Age: 63
End: 2023-11-28

## 2023-12-15 RX ORDER — VIBEGRON 75 MG/1
TABLET, FILM COATED ORAL
Qty: 90 TABLET | Refills: 1 | Status: SHIPPED | OUTPATIENT
Start: 2023-12-15

## 2023-12-15 NOTE — TELEPHONE ENCOUNTER
Medication:   Requested Prescriptions     Pending Prescriptions Disp Refills    GEMTESA 75 MG TABS tablet [Pharmacy Med Name: Barbara Yu 75 MG TABLET] 90 tablet 1     Sig: TAKE 1 TABLET BY MOUTH EVERY DAY        Last Filled:  6/2/2023, 90, 1    Patient Phone Number: [phone] (home) [phone] (work)    Last appt: 9/7/2023   Next appt: Visit date not found    Last OARRS:       8/23/2023     2:08 PM   RX Monitoring   Periodic Controlled Substance Monitoring No signs of potential drug abuse or diversion identified.

## 2024-02-27 DIAGNOSIS — G89.29 CHRONIC PAIN OF RIGHT KNEE: ICD-10-CM

## 2024-02-27 DIAGNOSIS — M25.561 CHRONIC PAIN OF RIGHT KNEE: ICD-10-CM

## 2024-02-27 RX ORDER — PREGABALIN 150 MG/1
150 CAPSULE ORAL NIGHTLY
Qty: 90 CAPSULE | Refills: 1 | Status: SHIPPED | OUTPATIENT
Start: 2024-02-27 | End: 2024-08-25

## 2024-04-12 ENCOUNTER — TELEPHONE (OUTPATIENT)
Dept: SURGERY | Age: 64
End: 2024-04-12

## 2024-04-17 ENCOUNTER — TELEPHONE (OUTPATIENT)
Dept: SURGERY | Age: 64
End: 2024-04-17

## 2024-04-17 ENCOUNTER — OFFICE VISIT (OUTPATIENT)
Dept: SURGERY | Age: 64
End: 2024-04-17
Payer: COMMERCIAL

## 2024-04-17 VITALS
HEIGHT: 70 IN | BODY MASS INDEX: 22.54 KG/M2 | WEIGHT: 157.4 LBS | HEART RATE: 65 BPM | OXYGEN SATURATION: 96 % | RESPIRATION RATE: 18 BRPM

## 2024-04-17 DIAGNOSIS — Z80.3 FAMILY HISTORY OF BREAST CANCER: ICD-10-CM

## 2024-04-17 DIAGNOSIS — Z98.82 HISTORY OF BREAST AUGMENTATION: ICD-10-CM

## 2024-04-17 DIAGNOSIS — R92.30 DENSE BREAST TISSUE: ICD-10-CM

## 2024-04-17 DIAGNOSIS — Z91.89 INCREASED RISK OF BREAST CANCER: Primary | ICD-10-CM

## 2024-04-17 PROCEDURE — 99204 OFFICE O/P NEW MOD 45 MIN: CPT | Performed by: NURSE PRACTITIONER

## 2024-04-17 NOTE — PROGRESS NOTES
Risk assessment using JAMES Breast Cancer Risk Evaluation Tool to evaluate her risk compared to the general population.  Her lifetime risk for breast cancer is 17.8% (general population average 8.4%).        ASSESSMENT:  - Increased/Average Risk for Breast Cancer based on risk profile for breast cancer screening.  Annabel (JAMES) 8.0 Model calculated 17.8% risk today, anything greater than 20% is considered to be high risk for breast cancer.    - Screening Breast Examination   - Dense breast   - History is significant for breast augmentation 2006,  in [facility]   - Family History of Breast Cancer - mother, dx early 50s     PLAN:   Mary Velasquez is at increased/average lifetime risk for breast cancer therefore recommend continued annual screening mammography with tomosynthesis.  Due 4/2024  Recommend increased screening with whole breast U/S and/or breast MRI given dense breast tissue and family history of breast cancer.  She is going to check with her insurance company to see if U/S or MRI screening is covered, if so, she will notify our office for orders and scheduling.   Anxiety and claustrophobia with MRI imaging, patient requesting medication prior to imaging.  Patient will call 1-2 weeks prior to scheduled imaging requesting medication to be called into pharmacy.   Will plan for Valium 2mg tablet x2, one tablet to be taken 1 hour prior to MRI and the 2nd tablet to be taken upon arrival to MRI if needed.  No refills.  Must have a  for the day of imaing.    Recommend q6-12 month clinical breast exams   Discussed the importance of breast awareness including the importance and technique of self breast exams  Most recent breast imaging was reviewed and the results were discussed with the patient, all questions answered  Reviewed the four categories of breast density   Notify office if any family members develop breast and/or ovarian cancer for risk re-assessment.   Education provided for

## 2024-04-17 NOTE — PATIENT INSTRUCTIONS
Check to see if insurance company will cover:  - Complete breast U/S screening, right and left breast  - Breast MRI with and without contrast     For diagnosis of:   Dense breast tissue   Family history of breast cancer  Increase risk for breast cancer

## 2024-04-18 NOTE — TELEPHONE ENCOUNTER
Patient calling about MRI and insurance. [facility] will cover MRI but provider must call provider services line at [phone] for auth. Advised that nurse will call back with next steps

## 2024-04-25 DIAGNOSIS — R92.30 DENSE BREAST TISSUE: Primary | ICD-10-CM

## 2024-04-25 DIAGNOSIS — Z91.89 INCREASED RISK OF BREAST CANCER: ICD-10-CM

## 2024-04-25 DIAGNOSIS — Z80.3 FAMILY HISTORY OF BREAST CANCER: ICD-10-CM

## 2024-04-30 ENCOUNTER — TELEPHONE (OUTPATIENT)
Dept: BREAST CENTER | Age: 64
End: 2024-04-30

## 2024-04-30 NOTE — TELEPHONE ENCOUNTER
Patient stated she has MRI scheduled and believed we were going to call in valium to relax her during the MRI. I did not see that any had been prescribed. I informed patient I would reach out to clinical staff and get back to her. Please advise.

## 2024-04-30 NOTE — TELEPHONE ENCOUNTER
Medication called in to pharmacy on file for patient. Left VM for patient to let her know medication was called in.

## 2024-04-30 NOTE — TELEPHONE ENCOUNTER
Patient is requesting medication for MRI on 5/7/24. Per you last note the following was talked about.  Anxiety and claustrophobia with MRI imaging, patient requesting medication prior to imaging.  Patient will call 1-2 weeks prior to scheduled imaging requesting medication to be called into pharmacy.   Will plan for Valium 2mg tablet x2, one tablet to be taken 1 hour prior to MRI and the 2nd tablet to be taken upon arrival to MRI if needed.  No refills.  Must have a  for the day of imaing.    Is it ok to call in this medication?

## 2024-05-14 ENCOUNTER — HOSPITAL ENCOUNTER (OUTPATIENT)
Dept: MRI IMAGING | Age: 64
Discharge: HOME | End: 2024-05-14
Payer: COMMERCIAL

## 2024-05-14 DIAGNOSIS — Z91.89 INCREASED RISK OF BREAST CANCER: ICD-10-CM

## 2024-05-14 DIAGNOSIS — R92.30 DENSE BREAST TISSUE: ICD-10-CM

## 2024-05-14 DIAGNOSIS — Z80.3 FAMILY HISTORY OF BREAST CANCER: ICD-10-CM

## 2024-05-14 PROCEDURE — A9579 GAD-BASE MR CONTRAST NOS,1ML: HCPCS | Performed by: NURSE PRACTITIONER

## 2024-05-14 PROCEDURE — 6360000004 HC RX CONTRAST MEDICATION: Performed by: NURSE PRACTITIONER

## 2024-05-14 PROCEDURE — 2580000003 HC RX 258: Performed by: NURSE PRACTITIONER

## 2024-05-14 PROCEDURE — C8908 MRI W/O FOL W/CONT, BREAST,: HCPCS

## 2024-05-14 RX ORDER — 0.9 % SODIUM CHLORIDE 0.9 %
50 INTRAVENOUS SOLUTION INTRAVENOUS ONCE
Status: COMPLETED | OUTPATIENT
Start: 2024-05-14 | End: 2024-05-14

## 2024-05-14 RX ORDER — SODIUM CHLORIDE 0.9 % (FLUSH) 0.9 %
5-40 SYRINGE (ML) INJECTION 2 TIMES DAILY
Status: DISCONTINUED | OUTPATIENT
Start: 2024-05-14 | End: 2024-05-15 | Stop reason: HOSPADM

## 2024-05-14 RX ADMIN — SODIUM CHLORIDE 50 ML: 9 INJECTION, SOLUTION INTRAVENOUS at 16:51

## 2024-05-14 RX ADMIN — GADOTERIDOL 14 ML: 279.3 INJECTION, SOLUTION INTRAVENOUS at 16:50

## 2024-05-14 RX ADMIN — SODIUM CHLORIDE, PRESERVATIVE FREE 10 ML: 5 INJECTION INTRAVENOUS at 16:51

## 2024-05-16 DIAGNOSIS — Z12.39 BREAST SCREENING: ICD-10-CM

## 2024-05-16 DIAGNOSIS — Z91.89 AT INCREASED RISK OF BREAST CANCER: Primary | ICD-10-CM

## 2024-05-16 DIAGNOSIS — R92.30 DENSE BREAST TISSUE: ICD-10-CM

## 2024-05-16 DIAGNOSIS — Z80.3 FAMILY HISTORY OF BREAST CANCER: ICD-10-CM

## 2024-05-19 DIAGNOSIS — E78.00 PURE HYPERCHOLESTEROLEMIA: ICD-10-CM

## 2024-05-19 DIAGNOSIS — F32.0 CURRENT MILD EPISODE OF MAJOR DEPRESSIVE DISORDER WITHOUT PRIOR EPISODE (HCC): ICD-10-CM

## 2024-05-20 ENCOUNTER — OFFICE VISIT (OUTPATIENT)
Dept: GYNECOLOGY | Age: 64
End: 2024-05-20
Payer: COMMERCIAL

## 2024-05-20 VITALS
WEIGHT: 163 LBS | BODY MASS INDEX: 23.34 KG/M2 | SYSTOLIC BLOOD PRESSURE: 118 MMHG | OXYGEN SATURATION: 100 % | HEART RATE: 55 BPM | HEIGHT: 70 IN | RESPIRATION RATE: 17 BRPM | DIASTOLIC BLOOD PRESSURE: 72 MMHG

## 2024-05-20 DIAGNOSIS — Z01.419 WELL WOMAN EXAM WITH ROUTINE GYNECOLOGICAL EXAM: Primary | ICD-10-CM

## 2024-05-20 PROCEDURE — 99396 PREV VISIT EST AGE 40-64: CPT | Performed by: OBSTETRICS & GYNECOLOGY

## 2024-05-20 RX ORDER — ROSUVASTATIN CALCIUM 10 MG/1
10 TABLET, COATED ORAL NIGHTLY
Qty: 30 TABLET | Refills: 0 | Status: SHIPPED | OUTPATIENT
Start: 2024-05-20 | End: 2024-06-19

## 2024-05-20 RX ORDER — TRAZODONE HYDROCHLORIDE 100 MG/1
200 TABLET ORAL NIGHTLY
Qty: 60 TABLET | Refills: 0 | Status: SHIPPED | OUTPATIENT
Start: 2024-05-20 | End: 2024-06-19

## 2024-05-20 ASSESSMENT — ENCOUNTER SYMPTOMS
GASTROINTESTINAL NEGATIVE: 1
EYES NEGATIVE: 1
ALLERGIC/IMMUNOLOGIC NEGATIVE: 1
RESPIRATORY NEGATIVE: 1

## 2024-05-20 NOTE — TELEPHONE ENCOUNTER
Medication:   Requested Prescriptions     Pending Prescriptions Disp Refills    traZODone (DESYREL) 100 MG tablet [Pharmacy Med Name: TRAZODONE 100 MG TABLET] 180 tablet 3     Sig: TAKE 2 TABLETS BY MOUTH EVERY NIGHT    rosuvastatin (CRESTOR) 10 MG tablet [Pharmacy Med Name: ROSUVASTATIN CALCIUM 10 MG TAB] 90 tablet 1     Sig: TAKE 1 TABLET BY MOUTH EVERY DAY AT NIGHT       Last Filled:      Patient Phone Number: [phone] (home)     Last appt: 9/7/2023   Next appt: Visit date not found    Last Lipid:   Lab Results   Component Value Date/Time    CHOL 201 10/16/2015 11:14 AM    TRIG 57 10/16/2015 11:14 AM    HDL 69 05/17/2023 11:20 AM    HDL 56 03/13/2012 08:58 AM

## 2024-05-21 ENCOUNTER — TELEPHONE (OUTPATIENT)
Dept: FAMILY MEDICINE CLINIC | Age: 64
End: 2024-05-21

## 2024-05-21 DIAGNOSIS — E78.00 PURE HYPERCHOLESTEROLEMIA: Primary | ICD-10-CM

## 2024-05-21 DIAGNOSIS — Z13.1 SCREENING FOR DIABETES MELLITUS: ICD-10-CM

## 2024-05-21 DIAGNOSIS — Z00.00 WELL ADULT EXAM: ICD-10-CM

## 2024-05-21 NOTE — TELEPHONE ENCOUNTER
labs pending please review.     Pt notified to fast for 10 hours nothing to eat or drink except water or black coffee and to increase water intake 24hrs prior

## 2024-05-21 NOTE — PROGRESS NOTES
Subjective   Patient ID: Mary Velasquez is a 64 y.o. female.    Patient is here for annual. Patient in menopause.     Gynecologic Exam        Review of Systems   Constitutional: Negative.    HENT: Negative.     Eyes: Negative.    Respiratory: Negative.     Cardiovascular: Negative.    Gastrointestinal: Negative.    Endocrine: Negative.    Genitourinary: Negative.    Musculoskeletal: Negative.    Skin: Negative.    Allergic/Immunologic: Negative.    Neurological: Negative.    Hematological: Negative.    Psychiatric/Behavioral: Negative.       Date of Birth 1960  Past Medical History:   Diagnosis Date    Alcoholism (HCC)     Recovering    Cardiomyopathy (HCC)     2021    Chronic left-sided low back pain without sciatica 05/26/2020    Degenerative joint disease     Depression     Fibrocystic disease of breast 06/15/2010    GERD (gastroesophageal reflux disease)     Headache(784.0)     Hemorrhoid 06/15/2010    Hyperlipidemia 06/25/2019    Mitral valve regurgitation     Osteoporosis     Screening mammogram, encounter for 02/04/2018    Dr sherry Rubio MD    Urinary incontinence     Vitamin D deficiency 03/16/2012     Past Surgical History:   Procedure Laterality Date    APPENDECTOMY  01/13/2021    BLADDER SURGERY      with hysterectomy-vaginal approach    BREAST SURGERY  2006    benign cyst on left    CARDIAC CATHETERIZATION  01/29/2021    COLONOSCOPY  11/30/2012    Dr Mohan Lepe    COSMETIC SURGERY      facial 12.11    FACIAL COSMETIC SURGERY      HYSTERECTOMY (CERVIX STATUS UNKNOWN)  1996    KNEE ARTHROPLASTY Left 9/20/2021    OPEN PATELLOFEMORAL  ARTHROPLASTY WITH ROBOTIC NAVIGATION- LEFT KNEE  - KERN & NEPHEW ([id number]) performed by Axel Riley MD at [facility] OR    SALPINGO-OOPHORECTOMY      still with left ovary    TOTAL KNEE ARTHROPLASTY Right 11/15/2021    RIGHT KNEE PATELLOFEMORAL ARTHROPLASTY WITH ROBOTIC NAVIGATION - KERN AND NEPHEW performed by Axel Riley MD at [facility] OR

## 2024-05-22 ENCOUNTER — TELEMEDICINE (OUTPATIENT)
Dept: FAMILY MEDICINE CLINIC | Age: 64
End: 2024-05-22
Payer: COMMERCIAL

## 2024-05-22 ENCOUNTER — TELEPHONE (OUTPATIENT)
Dept: FAMILY MEDICINE CLINIC | Age: 64
End: 2024-05-22

## 2024-05-22 DIAGNOSIS — G43.911 INTRACTABLE MIGRAINE WITH STATUS MIGRAINOSUS, UNSPECIFIED MIGRAINE TYPE: Primary | ICD-10-CM

## 2024-05-22 PROCEDURE — 99213 OFFICE O/P EST LOW 20 MIN: CPT | Performed by: NURSE PRACTITIONER

## 2024-05-22 RX ORDER — BUTALBITAL, ACETAMINOPHEN, CAFFEINE AND CODEINE PHOSPHATE 300; 50; 40; 30 MG/1; MG/1; MG/1; MG/1
1 CAPSULE ORAL EVERY 6 HOURS PRN
Qty: 12 CAPSULE | Refills: 0 | Status: SHIPPED | OUTPATIENT
Start: 2024-05-22 | End: 2024-05-24 | Stop reason: SDUPTHER

## 2024-05-22 SDOH — ECONOMIC STABILITY: INCOME INSECURITY: HOW HARD IS IT FOR YOU TO PAY FOR THE VERY BASICS LIKE FOOD, HOUSING, MEDICAL CARE, AND HEATING?: NOT HARD AT ALL

## 2024-05-22 SDOH — ECONOMIC STABILITY: FOOD INSECURITY: WITHIN THE PAST 12 MONTHS, YOU WORRIED THAT YOUR FOOD WOULD RUN OUT BEFORE YOU GOT MONEY TO BUY MORE.: NEVER TRUE

## 2024-05-22 SDOH — ECONOMIC STABILITY: FOOD INSECURITY: WITHIN THE PAST 12 MONTHS, THE FOOD YOU BOUGHT JUST DIDN'T LAST AND YOU DIDN'T HAVE MONEY TO GET MORE.: NEVER TRUE

## 2024-05-22 ASSESSMENT — PATIENT HEALTH QUESTIONNAIRE - PHQ9
9. THOUGHTS THAT YOU WOULD BE BETTER OFF DEAD, OR OF HURTING YOURSELF: NOT AT ALL
1. LITTLE INTEREST OR PLEASURE IN DOING THINGS: NOT AT ALL
SUM OF ALL RESPONSES TO PHQ QUESTIONS 1-9: 0
5. POOR APPETITE OR OVEREATING: NOT AT ALL
6. FEELING BAD ABOUT YOURSELF - OR THAT YOU ARE A FAILURE OR HAVE LET YOURSELF OR YOUR FAMILY DOWN: NOT AT ALL
8. MOVING OR SPEAKING SO SLOWLY THAT OTHER PEOPLE COULD HAVE NOTICED. OR THE OPPOSITE, BEING SO FIGETY OR RESTLESS THAT YOU HAVE BEEN MOVING AROUND A LOT MORE THAN USUAL: NOT AT ALL
10. IF YOU CHECKED OFF ANY PROBLEMS, HOW DIFFICULT HAVE THESE PROBLEMS MADE IT FOR YOU TO DO YOUR WORK, TAKE CARE OF THINGS AT HOME, OR GET ALONG WITH OTHER PEOPLE: NOT DIFFICULT AT ALL
SUM OF ALL RESPONSES TO PHQ QUESTIONS 1-9: 0
SUM OF ALL RESPONSES TO PHQ9 QUESTIONS 1 & 2: 0
SUM OF ALL RESPONSES TO PHQ QUESTIONS 1-9: 0
SUM OF ALL RESPONSES TO PHQ QUESTIONS 1-9: 0
4. FEELING TIRED OR HAVING LITTLE ENERGY: NOT AT ALL
7. TROUBLE CONCENTRATING ON THINGS, SUCH AS READING THE NEWSPAPER OR WATCHING TELEVISION: NOT AT ALL
3. TROUBLE FALLING OR STAYING ASLEEP: NOT AT ALL
2. FEELING DOWN, DEPRESSED OR HOPELESS: NOT AT ALL

## 2024-05-22 ASSESSMENT — ENCOUNTER SYMPTOMS
NAUSEA: 1
VOMITING: 0

## 2024-05-22 NOTE — PROGRESS NOTES
status: Alert and awake. Oriented to person/place/time. Able to follow commands    Eyes: EOM normal. Sclera normal. No discharge visible  HENT: Normocephalic, atraumatic.   Mouth/Throat: Mucous membranes are moist. External Ears Normal    Neck: No visualized mass   Pulmonary/Chest: Respiratory effort normal.  No visualized signs of difficulty breathing or respiratory distress        Musculoskeletal:  Normal range of motion of neck  Neurological:       No Facial Asymmetry (Cranial nerve 7 motor function) (limited exam to video visit). No gaze palsy       Skin:  No significant exanthematous lesions or discoloration noted on facial skin       Psychiatric: Normal Affect. No Hallucinations        Mary Velasquez was evaluated through a synchronous (real-time) audio-video encounter. The patient (or guardian if applicable) is aware that this is a billable service, which includes applicable co-pays. This Virtual Visit was conducted with patient's (and/or legal guardian's) consent. Patient identification was verified, and a caregiver was present when appropriate.   The patient was located at Home: [address] [place] [address]  Provider was located at Facility (Appt Dept): [address] [phone]     --LANCE Allen - CNP on 5/22/2024 at 12:31 PM    An electronic signature was used to authenticate this note.

## 2024-05-22 NOTE — TELEPHONE ENCOUNTER
Please advise... I spoke with patient she said she can schedule a VV today to discuss. Please advise if appointment can be VV.

## 2024-05-22 NOTE — TELEPHONE ENCOUNTER
Patient states she has had a headache for about 5 days. No over the counter medication has helped he. Patient states she use to take  Butalbital-Acetaminophen-caffeine  from another provider that use to help her. Please advise

## 2024-05-23 ENCOUNTER — TELEPHONE (OUTPATIENT)
Dept: FAMILY MEDICINE CLINIC | Age: 64
End: 2024-05-23

## 2024-05-23 DIAGNOSIS — G43.911 INTRACTABLE MIGRAINE WITH STATUS MIGRAINOSUS, UNSPECIFIED MIGRAINE TYPE: Primary | ICD-10-CM

## 2024-05-23 RX ORDER — BUTALBITAL, ACETAMINOPHEN AND CAFFEINE 50; 325; 40 MG/1; MG/1; MG/1
1 TABLET ORAL EVERY 4 HOURS PRN
Qty: 30 TABLET | Refills: 0 | Status: SHIPPED | OUTPATIENT
Start: 2024-05-23

## 2024-05-23 NOTE — TELEPHONE ENCOUNTER
[facility] pharmacy is requesting an alternative. Current medication is on back order.     butalbital-acetaminophen-caffeine-codeine (FIORICET WITH CODEINE) -84-30 MG per capsule

## 2024-05-24 ENCOUNTER — TELEPHONE (OUTPATIENT)
Dept: FAMILY MEDICINE CLINIC | Age: 64
End: 2024-05-24

## 2024-05-24 DIAGNOSIS — G43.911 INTRACTABLE MIGRAINE WITH STATUS MIGRAINOSUS, UNSPECIFIED MIGRAINE TYPE: ICD-10-CM

## 2024-05-24 RX ORDER — BUTALBITAL, ACETAMINOPHEN, CAFFEINE AND CODEINE PHOSPHATE 300; 50; 40; 30 MG/1; MG/1; MG/1; MG/1
1 CAPSULE ORAL EVERY 6 HOURS PRN
Qty: 12 CAPSULE | Refills: 0 | Status: SHIPPED | OUTPATIENT
Start: 2024-05-24 | End: 2024-06-03

## 2024-05-24 NOTE — TELEPHONE ENCOUNTER
Patient called and pharm will not fill the       butalbital-acetaminophen-caffeine-codeine (FIORICET WITH CODEINE) -80-30 MG per capsule [[id number]     They are saying it to soon to fill  because of the prescription was filled the day before just different type of script.     Please call and advise

## 2024-05-24 NOTE — TELEPHONE ENCOUNTER
butalbital-acetaminophen-caffeine (FIORICET, ESGIC) -40 MG per tablet       Pt called and said above medicine is just making her feel loopy but is not really hitting her headache pain. Pt will like the version WITH codeine to be called in.      Send it to [facility] in [facility]

## 2024-05-28 NOTE — TELEPHONE ENCOUNTER
Pharmacy notified by fioricet with codeine is on back order which is why I prescribed the alternative. Do they now have it in stock?  If so, I authorize early fill of fioricet with codeine

## 2024-06-03 ENCOUNTER — OFFICE VISIT (OUTPATIENT)
Dept: FAMILY MEDICINE CLINIC | Age: 64
End: 2024-06-03
Payer: COMMERCIAL

## 2024-06-03 VITALS
OXYGEN SATURATION: 98 % | HEART RATE: 56 BPM | SYSTOLIC BLOOD PRESSURE: 112 MMHG | WEIGHT: 162.8 LBS | BODY MASS INDEX: 23.31 KG/M2 | HEIGHT: 70 IN | DIASTOLIC BLOOD PRESSURE: 70 MMHG

## 2024-06-03 DIAGNOSIS — F33.0 MILD EPISODE OF RECURRENT MAJOR DEPRESSIVE DISORDER (HCC): ICD-10-CM

## 2024-06-03 DIAGNOSIS — M25.561 CHRONIC PAIN OF RIGHT KNEE: ICD-10-CM

## 2024-06-03 DIAGNOSIS — G89.29 CHRONIC PAIN OF RIGHT KNEE: ICD-10-CM

## 2024-06-03 DIAGNOSIS — Z00.00 WELL ADULT EXAM: Primary | ICD-10-CM

## 2024-06-03 DIAGNOSIS — E78.5 DYSLIPIDEMIA: ICD-10-CM

## 2024-06-03 DIAGNOSIS — Z13.1 SCREENING FOR DIABETES MELLITUS: ICD-10-CM

## 2024-06-03 DIAGNOSIS — I42.9 SECONDARY CARDIOMYOPATHY (HCC): ICD-10-CM

## 2024-06-03 PROCEDURE — 99396 PREV VISIT EST AGE 40-64: CPT | Performed by: NURSE PRACTITIONER

## 2024-06-03 ASSESSMENT — PATIENT HEALTH QUESTIONNAIRE - PHQ9
SUM OF ALL RESPONSES TO PHQ9 QUESTIONS 1 & 2: 0
SUM OF ALL RESPONSES TO PHQ QUESTIONS 1-9: 0
9. THOUGHTS THAT YOU WOULD BE BETTER OFF DEAD, OR OF HURTING YOURSELF: NOT AT ALL
7. TROUBLE CONCENTRATING ON THINGS, SUCH AS READING THE NEWSPAPER OR WATCHING TELEVISION: NOT AT ALL
SUM OF ALL RESPONSES TO PHQ QUESTIONS 1-9: 0
SUM OF ALL RESPONSES TO PHQ QUESTIONS 1-9: 0
3. TROUBLE FALLING OR STAYING ASLEEP: NOT AT ALL
10. IF YOU CHECKED OFF ANY PROBLEMS, HOW DIFFICULT HAVE THESE PROBLEMS MADE IT FOR YOU TO DO YOUR WORK, TAKE CARE OF THINGS AT HOME, OR GET ALONG WITH OTHER PEOPLE: NOT DIFFICULT AT ALL
5. POOR APPETITE OR OVEREATING: NOT AT ALL
4. FEELING TIRED OR HAVING LITTLE ENERGY: NOT AT ALL
1. LITTLE INTEREST OR PLEASURE IN DOING THINGS: NOT AT ALL
SUM OF ALL RESPONSES TO PHQ QUESTIONS 1-9: 0
6. FEELING BAD ABOUT YOURSELF - OR THAT YOU ARE A FAILURE OR HAVE LET YOURSELF OR YOUR FAMILY DOWN: NOT AT ALL
2. FEELING DOWN, DEPRESSED OR HOPELESS: NOT AT ALL
8. MOVING OR SPEAKING SO SLOWLY THAT OTHER PEOPLE COULD HAVE NOTICED. OR THE OPPOSITE, BEING SO FIGETY OR RESTLESS THAT YOU HAVE BEEN MOVING AROUND A LOT MORE THAN USUAL: NOT AT ALL

## 2024-06-03 NOTE — PROGRESS NOTES
Chief Complaint:   Mary Velasquez is a 64 y.o. female who presents for complete physical examination.    History of Present Illness:    Cardiomyopathy; dyslipidemia: managed by Dr Grigsby, [facility] cardiology. Last appointment 4/10/24. Per pt, no changes, follow up in 6 months. She is compliant with Carvedilol, Diovan, and Crestor.     Depression: states not feeling depressed. Father passed away in March, he was 90. Mother is ill, needing aortic valve replacement but awaiting insurance coverage. States grieving and not feeling depressed.     Migraines: better since increasing medical marijuana dose. Not sure fioricet helped that much.     Chronic knee pain: currently taking Lyrica 150 mg nightly. Working  well. Considering decreasing to 100 mg if pain stays well controlled.       Past Medical History:   Diagnosis Date    Alcoholism (HCC)     Recovering    Cardiomyopathy (HCC)     2021    Chronic left-sided low back pain without sciatica 05/26/2020    Degenerative joint disease     Depression     Fibrocystic disease of breast 06/15/2010    GERD (gastroesophageal reflux disease)     Headache(784.0)     Hemorrhoid 06/15/2010    Hyperlipidemia 06/25/2019    Mitral valve regurgitation     Osteoporosis     Screening mammogram, encounter for 02/04/2018    Dr sherry Rubio MD    Urinary incontinence     Vitamin D deficiency 03/16/2012       Past Surgical History:   Procedure Laterality Date    APPENDECTOMY  01/13/2021    BLADDER SURGERY      with hysterectomy-vaginal approach    BREAST SURGERY  2006    benign cyst on left    CARDIAC CATHETERIZATION  01/29/2021    COLONOSCOPY  11/30/2012    Dr Mohan Lepe    COSMETIC SURGERY      facial 12.11    FACIAL COSMETIC SURGERY      HYSTERECTOMY (CERVIX STATUS UNKNOWN)  1996    KNEE ARTHROPLASTY Left 9/20/2021    OPEN PATELLOFEMORAL  ARTHROPLASTY WITH ROBOTIC NAVIGATION- LEFT KNEE  - KERN & NEPHEW ([id number]) performed by Axel Riley MD at [facility] OR

## 2024-06-03 NOTE — ASSESSMENT & PLAN NOTE
Well-controlled, continue current medications  No inconsistencies with OARRS.  May call for refill

## 2024-06-13 DIAGNOSIS — F32.0 CURRENT MILD EPISODE OF MAJOR DEPRESSIVE DISORDER WITHOUT PRIOR EPISODE (HCC): ICD-10-CM

## 2024-06-13 DIAGNOSIS — E78.00 PURE HYPERCHOLESTEROLEMIA: ICD-10-CM

## 2024-06-13 RX ORDER — ROSUVASTATIN CALCIUM 10 MG/1
10 TABLET, COATED ORAL NIGHTLY
Qty: 90 TABLET | Refills: 3 | Status: SHIPPED | OUTPATIENT
Start: 2024-06-13

## 2024-06-13 RX ORDER — TRAZODONE HYDROCHLORIDE 100 MG/1
200 TABLET ORAL NIGHTLY
Qty: 180 TABLET | Refills: 1 | Status: SHIPPED | OUTPATIENT
Start: 2024-06-13

## 2024-06-13 NOTE — TELEPHONE ENCOUNTER
Medication:   Requested Prescriptions     Pending Prescriptions Disp Refills    rosuvastatin (CRESTOR) 10 MG tablet [Pharmacy Med Name: ROSUVASTATIN CALCIUM 10 MG TAB] 90 tablet 1     Sig: TAKE 1 TABLET BY MOUTH EVERY DAY AT NIGHT       Last Filled:  05/20/24 30 tablet     Patient Phone Number: [phone] (home)     Last appt: 6/3/2024   Next appt: Visit date not found    Last Lipid:   Lab Results   Component Value Date/Time    CHOL 201 10/16/2015 11:14 AM    TRIG 57 10/16/2015 11:14 AM    HDL 69 05/17/2023 11:20 AM    HDL 56 03/13/2012 08:58 AM

## 2024-06-13 NOTE — TELEPHONE ENCOUNTER
Medication:   Requested Prescriptions     Pending Prescriptions Disp Refills    traZODone (DESYREL) 100 MG tablet [Pharmacy Med Name: TRAZODONE 100 MG TABLET] 180 tablet 1     Sig: TAKE 2 TABLETS BY MOUTH NIGHTLY        Last Filled:  05/20/24 60 tab 0 refill     Patient Phone Number: [phone] (home)     Last appt: 6/3/2024   Next appt: Visit date not found    Last OARRS:       5/24/2024     3:38 PM   RX Monitoring   Periodic Controlled Substance Monitoring No signs of potential drug abuse or diversion identified.

## 2024-07-03 RX ORDER — VIBEGRON 75 MG/1
TABLET, FILM COATED ORAL
Qty: 90 TABLET | Refills: 1 | Status: SHIPPED | OUTPATIENT
Start: 2024-07-03

## 2024-07-03 NOTE — TELEPHONE ENCOUNTER
Medication:   Requested Prescriptions     Pending Prescriptions Disp Refills    GEMTESA 75 MG TABS tablet [Pharmacy Med Name: GEMTESA 75 MG TABLET] 90 tablet 1     Sig: TAKE 1 TABLET BY MOUTH EVERY DAY        Last Filled:  12/15/23 90 tablet 1 refill     Patient Phone Number: [phone] (home)     Last appt: 6/3/2024   Next appt: Visit date not found    Last OARRS:       5/24/2024     3:38 PM   RX Monitoring   Periodic Controlled Substance Monitoring No signs of potential drug abuse or diversion identified.

## 2024-08-16 DIAGNOSIS — F32.0 CURRENT MILD EPISODE OF MAJOR DEPRESSIVE DISORDER WITHOUT PRIOR EPISODE (HCC): ICD-10-CM

## 2024-08-16 RX ORDER — BUPROPION HYDROCHLORIDE 300 MG/1
TABLET ORAL
Qty: 90 TABLET | Refills: 3 | Status: SHIPPED | OUTPATIENT
Start: 2024-08-16

## 2024-08-16 NOTE — TELEPHONE ENCOUNTER
Medication:   Requested Prescriptions     Pending Prescriptions Disp Refills    buPROPion (WELLBUTRIN XL) 300 MG extended release tablet [Pharmacy Med Name: BUPROPION HCL  MG TABLET] 90 tablet 3     Sig: TAKE 1 TABLET BY MOUTH EVERY DAY IN THE MORNING        Last Filled:  90.3  05.31.23    Patient Phone Number: [phone] (home)     Last appt: 6/3/2024   Next appt: Visit date not found    Last OARRS:       5/24/2024     3:38 PM   RX Monitoring   Periodic Controlled Substance Monitoring No signs of potential drug abuse or diversion identified.

## 2024-08-20 DIAGNOSIS — M25.561 CHRONIC PAIN OF RIGHT KNEE: ICD-10-CM

## 2024-08-20 DIAGNOSIS — G89.29 CHRONIC PAIN OF RIGHT KNEE: ICD-10-CM

## 2024-08-20 NOTE — TELEPHONE ENCOUNTER
Medication:   Requested Prescriptions     Pending Prescriptions Disp Refills    pregabalin (LYRICA) 150 MG capsule [Pharmacy Med Name: PREGABALIN 150 MG CAPSULE] 90 capsule 1     Sig: Take 1 capsule by mouth nightly.        Last Filled:  2/27/2024, 90, 1    Patient Phone Number: [phone] (home)     Last appt: 6/3/2024   Next appt: Visit date not found    Last OARRS:       5/24/2024     3:38 PM   RX Monitoring   Periodic Controlled Substance Monitoring No signs of potential drug abuse or diversion identified.

## 2024-08-21 RX ORDER — PREGABALIN 150 MG/1
150 CAPSULE ORAL NIGHTLY
Qty: 90 CAPSULE | Refills: 1 | Status: SHIPPED | OUTPATIENT
Start: 2024-08-21 | End: 2025-02-17

## 2024-11-15 DIAGNOSIS — F32.0 CURRENT MILD EPISODE OF MAJOR DEPRESSIVE DISORDER WITHOUT PRIOR EPISODE (HCC): ICD-10-CM

## 2024-11-18 RX ORDER — VIBEGRON 75 MG/1
TABLET, FILM COATED ORAL
Qty: 90 TABLET | Refills: 1 | Status: SHIPPED | OUTPATIENT
Start: 2024-11-18

## 2024-11-18 RX ORDER — TRAZODONE HYDROCHLORIDE 100 MG/1
TABLET ORAL
Qty: 180 TABLET | Refills: 1 | Status: SHIPPED | OUTPATIENT
Start: 2024-11-18

## 2024-11-18 NOTE — TELEPHONE ENCOUNTER
Medication:   Requested Prescriptions     Pending Prescriptions Disp Refills    traZODone (DESYREL) 100 MG tablet [Pharmacy Med Name: TRAZODONE 100 MG TABLET] 180 tablet 1     Sig: TAKE 2 TABLETS BY MOUTH EVERY DAY AT NIGHT        Last Filled:      Patient Phone Number: [phone] (home)     Last appt: 6/3/2024   Next appt: Visit date not found    Last OARRS:       5/24/2024     3:38 PM   RX Monitoring   Periodic Controlled Substance Monitoring No signs of potential drug abuse or diversion identified.

## 2024-11-18 NOTE — TELEPHONE ENCOUNTER
Medication:   Requested Prescriptions     Pending Prescriptions Disp Refills    GEMTESA 75 MG TABS tablet [Pharmacy Med Name: GEMTESA 75 MG TABLET] 90 tablet 1     Sig: TAKE 1 TABLET BY MOUTH EVERY DAY        Last Filled:      Patient Phone Number: [phone] (home)     Last appt: 6/3/2024   Next appt: 11/15/2024    Last OARRS:       5/24/2024     3:38 PM   RX Monitoring   Periodic Controlled Substance Monitoring No signs of potential drug abuse or diversion identified.

## 2024-11-26 ENCOUNTER — OFFICE VISIT (OUTPATIENT)
Dept: SURGERY | Age: 64
End: 2024-11-26
Payer: COMMERCIAL

## 2024-11-26 ENCOUNTER — TELEPHONE (OUTPATIENT)
Dept: SURGERY | Age: 64
End: 2024-11-26

## 2024-11-26 VITALS
BODY MASS INDEX: 24.14 KG/M2 | WEIGHT: 168.6 LBS | RESPIRATION RATE: 18 BRPM | HEIGHT: 70 IN | OXYGEN SATURATION: 98 % | HEART RATE: 64 BPM

## 2024-11-26 DIAGNOSIS — Z91.89 INCREASED RISK OF BREAST CANCER: ICD-10-CM

## 2024-11-26 DIAGNOSIS — Z80.3 FAMILY HISTORY OF BREAST CANCER: ICD-10-CM

## 2024-11-26 DIAGNOSIS — R92.30 DENSE BREAST TISSUE: ICD-10-CM

## 2024-11-26 DIAGNOSIS — Z91.89 INCREASED RISK OF BREAST CANCER: Primary | ICD-10-CM

## 2024-11-26 DIAGNOSIS — R92.30 DENSE BREAST TISSUE: Primary | ICD-10-CM

## 2024-11-26 DIAGNOSIS — Z98.82 HISTORY OF BREAST AUGMENTATION: ICD-10-CM

## 2024-11-26 PROCEDURE — 99214 OFFICE O/P EST MOD 30 MIN: CPT | Performed by: NURSE PRACTITIONER

## 2024-11-26 NOTE — TELEPHONE ENCOUNTER
Patient arrived for appointment today and asked if we received and reviewed her imaging from [facility]. Nothing has been received and a call was made to the [facility] location and no one answered. Had to leave a VM for someone to call back about needing results from imaging completed on 11/19/24.    After visit patient lets us know that she received a call from [facility] stating her imaging was not complete and was waiting on imaging to be received.    I am awaiting a return call and then will request imaging report when the report is ready.

## 2024-11-26 NOTE — PROGRESS NOTES
sherry Rubio MD    Urinary incontinence     Vitamin D deficiency 2012       Past Surgical History:   Procedure Laterality Date    APPENDECTOMY  2021    BLADDER SURGERY      with hysterectomy-vaginal approach    BREAST SURGERY      benign cyst on left    CARDIAC CATHETERIZATION  2021    COLONOSCOPY  2012    Dr Mohan Lepe    COSMETIC SURGERY      facial 12.11    FACIAL COSMETIC SURGERY      HYSTERECTOMY (CERVIX STATUS UNKNOWN)      KNEE ARTHROPLASTY Left 2021    OPEN PATELLOFEMORAL  ARTHROPLASTY WITH ROBOTIC NAVIGATION- LEFT KNEE  - KERN & NEPHEW ([id number]) performed by Axel Riley MD at [facility] OR    SALPINGO-OOPHORECTOMY      still with left ovary    TOTAL KNEE ARTHROPLASTY Right 11/15/2021    RIGHT KNEE PATELLOFEMORAL ARTHROPLASTY WITH ROBOTIC NAVIGATION - CONCHA AND NEPHENRRIQUE performed by Axel Riley MD at [facility] OR         Menstrual History:  Menarche age: 14  .  Age first live birth: 31  Breastfeed: total 8 months   Menopausal state: partial hysterectomy with right BSO age 37     Oral contraceptive use: denies   Hormone replacement therapy use: denies    Breast density: heterogeneously dense  Ashkenazi Jainism Heritage: no   Genetic testing: none     Family history significant for breast and ovarian cancer:   Mother, breast cancer, dx early 50s, living at 90 ()    Family History   Problem Relation Age of Onset    High Blood Pressure Mother     High Cholesterol Mother     Breast Cancer Mother         early 50's. carcinoma in situ    Heart Disease Mother         aortic valve disease    High Blood Pressure Father     High Cholesterol Father     Arthritis Father     No Known Problems Sister     No Known Problems Brother     Arthritis Paternal Grandmother     Rheum Arthritis Neg Hx     Osteoarthritis Neg Hx     Asthma Neg Hx     Cancer Neg Hx     Diabetes Neg Hx     Heart Failure Neg Hx     Hypertension Neg Hx     Migraines Neg Hx     Ovarian

## 2024-12-14 DIAGNOSIS — E78.00 PURE HYPERCHOLESTEROLEMIA: ICD-10-CM

## 2024-12-16 ENCOUNTER — TELEPHONE (OUTPATIENT)
Dept: SURGERY | Age: 64
End: 2024-12-16

## 2024-12-17 RX ORDER — ATORVASTATIN CALCIUM 20 MG/1
20 TABLET, FILM COATED ORAL DAILY
Qty: 90 TABLET | Refills: 1 | Status: SHIPPED | OUTPATIENT
Start: 2024-12-17

## 2024-12-17 NOTE — TELEPHONE ENCOUNTER
Medication:   Requested Prescriptions     Pending Prescriptions Disp Refills    atorvastatin (LIPITOR) 20 MG tablet [Pharmacy Med Name: ATORVASTATIN 20 MG TABLET]  0       Last Filled:      Patient Phone Number: [phone] (home)     Last appt: 6/3/2024   Next appt: Visit date not found    Last Lipid:   Lab Results   Component Value Date/Time    CHOL 201 10/16/2015 11:14 AM    TRIG 57 10/16/2015 11:14 AM    HDL 69 05/17/2023 11:20 AM    HDL 56 03/13/2012 08:58 AM

## 2024-12-18 ENCOUNTER — TELEPHONE (OUTPATIENT)
Dept: GYNECOLOGY | Age: 64
End: 2024-12-18

## 2024-12-18 NOTE — TELEPHONE ENCOUNTER
Called and spoke to patient she says she is seeing Carrie Herman NP she is having additional imaging done through [facility]. Also went over her results

## 2025-02-24 DIAGNOSIS — M25.561 CHRONIC PAIN OF RIGHT KNEE: ICD-10-CM

## 2025-02-24 DIAGNOSIS — G89.29 CHRONIC PAIN OF RIGHT KNEE: ICD-10-CM

## 2025-02-24 RX ORDER — PREGABALIN 150 MG/1
150 CAPSULE ORAL NIGHTLY
Qty: 90 CAPSULE | Refills: 1 | Status: SHIPPED | OUTPATIENT
Start: 2025-02-24 | End: 2025-08-23

## 2025-02-24 NOTE — TELEPHONE ENCOUNTER
Medication:   Requested Prescriptions     Pending Prescriptions Disp Refills    pregabalin (LYRICA) 150 MG capsule [Pharmacy Med Name: PREGABALIN 150 MG CAPSULE] 90 capsule      Sig: Take 1 capsule by mouth nightly for 180 days. Max Daily Amount: 150 mg        Last Filled:  8/21/2024 90 ct 1 refill     Patient Phone Number: [phone] (home)     Last appt: 6/3/2024   Next appt: Visit date not found    Last OARRS:       5/24/2024     3:38 PM   RX Monitoring   Periodic Controlled Substance Monitoring No signs of potential drug abuse or diversion identified.

## 2025-05-05 ENCOUNTER — HOSPITAL ENCOUNTER (OUTPATIENT)
Dept: MRI IMAGING | Age: 65
Discharge: HOME | End: 2025-05-05
Payer: COMMERCIAL

## 2025-05-05 DIAGNOSIS — R92.30 DENSE BREAST TISSUE: ICD-10-CM

## 2025-05-05 DIAGNOSIS — Z80.3 FAMILY HISTORY OF BREAST CANCER: ICD-10-CM

## 2025-05-05 DIAGNOSIS — Z91.89 INCREASED RISK OF BREAST CANCER: ICD-10-CM

## 2025-05-05 PROCEDURE — A9579 GAD-BASE MR CONTRAST NOS,1ML: HCPCS | Performed by: NURSE PRACTITIONER

## 2025-05-05 PROCEDURE — 6360000004 HC RX CONTRAST MEDICATION: Performed by: NURSE PRACTITIONER

## 2025-05-05 PROCEDURE — C8908 MRI W/O FOL W/CONT, BREAST,: HCPCS

## 2025-05-05 PROCEDURE — 2500000003 HC RX 250 WO HCPCS: Performed by: NURSE PRACTITIONER

## 2025-05-05 PROCEDURE — 2580000003 HC RX 258: Performed by: NURSE PRACTITIONER

## 2025-05-05 RX ORDER — 0.9 % SODIUM CHLORIDE 0.9 %
50 INTRAVENOUS SOLUTION INTRAVENOUS ONCE
Status: COMPLETED | OUTPATIENT
Start: 2025-05-05 | End: 2025-05-05

## 2025-05-05 RX ORDER — SODIUM CHLORIDE 0.9 % (FLUSH) 0.9 %
10 SYRINGE (ML) INJECTION PRN
Status: DISCONTINUED | OUTPATIENT
Start: 2025-05-05 | End: 2025-05-06 | Stop reason: HOSPADM

## 2025-05-05 RX ADMIN — GADOTERIDOL 15 ML: 279.3 INJECTION, SOLUTION INTRAVENOUS at 14:50

## 2025-05-05 RX ADMIN — SODIUM CHLORIDE 50 ML: 9 INJECTION, SOLUTION INTRAVENOUS at 14:50

## 2025-05-05 RX ADMIN — SODIUM CHLORIDE, PRESERVATIVE FREE 10 ML: 5 INJECTION INTRAVENOUS at 14:44

## 2025-05-09 ENCOUNTER — TELEPHONE (OUTPATIENT)
Dept: SURGERY | Age: 65
End: 2025-05-09

## 2025-05-09 NOTE — TELEPHONE ENCOUNTER
Patient wanted to see if we had her previous images from prior imaging at a non [facility] facility. She's scheduled for more imaging on 5/15/25 but wanted to make sure [facility] Herman had the other images. Please call patient back at [phone]

## 2025-05-12 ENCOUNTER — RESULTS FOLLOW-UP (OUTPATIENT)
Dept: SURGERY | Age: 65
End: 2025-05-12

## 2025-05-15 ENCOUNTER — HOSPITAL ENCOUNTER (OUTPATIENT)
Dept: WOMENS IMAGING | Age: 65
Discharge: HOME | End: 2025-05-15
Payer: COMMERCIAL

## 2025-05-15 ENCOUNTER — HOSPITAL ENCOUNTER (OUTPATIENT)
Dept: ULTRASOUND IMAGING | Age: 65
Discharge: HOME | End: 2025-05-15
Payer: COMMERCIAL

## 2025-05-15 ENCOUNTER — RESULTS FOLLOW-UP (OUTPATIENT)
Dept: FAMILY MEDICINE CLINIC | Age: 65
End: 2025-05-15

## 2025-05-15 VITALS — WEIGHT: 170 LBS | BODY MASS INDEX: 24.34 KG/M2 | HEIGHT: 70 IN

## 2025-05-15 DIAGNOSIS — R92.8 ABNORMAL SCREENING MAMMOGRAM: ICD-10-CM

## 2025-05-15 DIAGNOSIS — R93.89 ABNORMAL MRI: ICD-10-CM

## 2025-05-15 PROCEDURE — G0279 TOMOSYNTHESIS, MAMMO: HCPCS

## 2025-05-15 PROCEDURE — 76642 ULTRASOUND BREAST LIMITED: CPT

## 2025-05-16 ENCOUNTER — PREP FOR PROCEDURE (OUTPATIENT)
Dept: MAMMOGRAPHY | Age: 65
End: 2025-05-16

## 2025-05-16 NOTE — PROGRESS NOTES
[facility]   [address]  [place], [place] [address]   Phone: [phone] option #1          NAME:  Mary Velasquez  YOB: 1960  MEDICAL RECORD NUMBER:  [id number]  TODAY'S DATE:  5/16/2025      Referring Physician: Dr. Paz    Procedure: Stereotactic Bx    Left Breast    Date of biopsy: 5/22/25    Patient taking blood thinners: no    Medicine allergies: yes - Lisinopril  Severity: Low   Reactions: Not Documented   Reaction type: Intolerance   Comment: Cough       Special Instructions: NA    Reviewed pre and post biopsy instructions/information with patient.  Pt verbalized understanding.       Stereotactic Biopsy Education     What is it?  Stereotactic breast biopsy is a test that uses imaging, such as  X-ray, to find an area of your breast where a tissue sample will be taken. The sample is viewed  under a microscope to check for signs of breast cancer.     Why is this test done?  This type of breast biopsy is usually done to check for cancer in a lump or microcalcifications found during a mammogram.     How can you prepare for the test?    You may take your regular medications as prescribed, other than blood thinners.   You may eat and drink before the test.  Take a shower the evening or morning before the biopsy.  What happens before the test?   Mammogram images are taken to find the exact site for the biopsy.  Your skin is washed with an alcohol prep.    You will be given an injection of medication to numb your breast.     What happens during the test?  When your breast is numb, a small knick is made in the skin.   Using the imaging, the doctor will guide the needle into the biopsy area.   A sample of breast tissue is taken through the needle.   A small titanium clip is inserted into your breast to abdirashid the biopsy site.   The needle is removed and pressure put on the needle site to stop any bleeding.   Steri Strips and a bandage will be

## 2025-05-19 DIAGNOSIS — R92.8 ABNORMAL FINDING ON BREAST IMAGING: ICD-10-CM

## 2025-05-19 DIAGNOSIS — Z80.3 FAMILY HISTORY OF BREAST CANCER: ICD-10-CM

## 2025-05-19 DIAGNOSIS — Z91.89 INCREASED RISK OF BREAST CANCER: Primary | ICD-10-CM

## 2025-05-19 DIAGNOSIS — R92.30 DENSE BREAST TISSUE: ICD-10-CM

## 2025-05-19 DIAGNOSIS — Z98.82 HISTORY OF BREAST AUGMENTATION: ICD-10-CM

## 2025-05-22 ENCOUNTER — TELEPHONE (OUTPATIENT)
Dept: FAMILY MEDICINE CLINIC | Age: 65
End: 2025-05-22

## 2025-05-23 DIAGNOSIS — F32.0 CURRENT MILD EPISODE OF MAJOR DEPRESSIVE DISORDER WITHOUT PRIOR EPISODE: ICD-10-CM

## 2025-05-23 RX ORDER — TRAZODONE HYDROCHLORIDE 100 MG/1
200 TABLET ORAL NIGHTLY PRN
Qty: 180 TABLET | Refills: 1 | Status: SHIPPED | OUTPATIENT
Start: 2025-05-23

## 2025-05-23 NOTE — TELEPHONE ENCOUNTER
Last OV: 6/3/2024  Next OV: Visit date not found    Next appointment due:    Last fill:11/18/2024  Refills:180/1  Medication:   Requested Prescriptions     Pending Prescriptions Disp Refills    traZODone (DESYREL) 100 MG tablet [Pharmacy Med Name: TRAZODONE 100 MG TABLET] 180 tablet 1     Sig: TAKE 2 TABLETS BY MOUTH EVERY NIGHT        Last Filled:      Patient Phone Number: [phone] (home)     Last appt: 6/3/2024   Next appt: Visit date not found    Last OARRS:       5/24/2024     3:38 PM   RX Monitoring   Periodic Controlled Substance Monitoring No signs of potential drug abuse or diversion identified.

## 2025-06-11 DIAGNOSIS — E78.00 PURE HYPERCHOLESTEROLEMIA: ICD-10-CM

## 2025-06-11 RX ORDER — ATORVASTATIN CALCIUM 20 MG/1
20 TABLET, FILM COATED ORAL DAILY
Qty: 90 TABLET | Refills: 1 | Status: SHIPPED | OUTPATIENT
Start: 2025-06-11

## 2025-06-11 NOTE — TELEPHONE ENCOUNTER
Medication:   Requested Prescriptions     Pending Prescriptions Disp Refills    atorvastatin (LIPITOR) 20 MG tablet [Pharmacy Med Name: ATORVASTATIN 20 MG TABLET] 90 tablet 1     Sig: TAKE 1 TABLET BY MOUTH EVERY DAY     Last Filled:  12/17/24    Last appt: 6/3/2024   Next appt: 6/12/2025    Last OARRS:       5/24/2024     3:38 PM   RX Monitoring   Periodic Controlled Substance Monitoring No signs of potential drug abuse or diversion identified.

## 2025-06-13 DIAGNOSIS — R92.8 ABNORMAL FINDING ON BREAST IMAGING: Primary | ICD-10-CM

## 2025-06-19 ENCOUNTER — OFFICE VISIT (OUTPATIENT)
Dept: FAMILY MEDICINE CLINIC | Age: 65
End: 2025-06-19

## 2025-06-19 VITALS
SYSTOLIC BLOOD PRESSURE: 108 MMHG | HEART RATE: 71 BPM | HEIGHT: 70 IN | WEIGHT: 145.2 LBS | OXYGEN SATURATION: 95 % | DIASTOLIC BLOOD PRESSURE: 68 MMHG | BODY MASS INDEX: 20.79 KG/M2

## 2025-06-19 DIAGNOSIS — M25.562 CHRONIC PAIN OF BOTH KNEES: ICD-10-CM

## 2025-06-19 DIAGNOSIS — I42.9 SECONDARY CARDIOMYOPATHY (HCC): ICD-10-CM

## 2025-06-19 DIAGNOSIS — F33.0 MILD EPISODE OF RECURRENT MAJOR DEPRESSIVE DISORDER: ICD-10-CM

## 2025-06-19 DIAGNOSIS — Z80.0 FAMILY HISTORY OF PANCREATIC CANCER: ICD-10-CM

## 2025-06-19 DIAGNOSIS — G89.29 CHRONIC PAIN OF BOTH KNEES: ICD-10-CM

## 2025-06-19 DIAGNOSIS — M25.561 CHRONIC PAIN OF BOTH KNEES: ICD-10-CM

## 2025-06-19 DIAGNOSIS — E78.5 DYSLIPIDEMIA: ICD-10-CM

## 2025-06-19 DIAGNOSIS — Z00.00 WELL ADULT EXAM: Primary | ICD-10-CM

## 2025-06-19 PROBLEM — G43.911 INTRACTABLE MIGRAINE WITH STATUS MIGRAINOSUS: Status: ACTIVE | Noted: 2025-06-19

## 2025-06-19 RX ORDER — ESOMEPRAZOLE MAGNESIUM 20 MG/1
20 GRANULE, DELAYED RELEASE ORAL DAILY
COMMUNITY

## 2025-06-19 SDOH — ECONOMIC STABILITY: FOOD INSECURITY: WITHIN THE PAST 12 MONTHS, THE FOOD YOU BOUGHT JUST DIDN'T LAST AND YOU DIDN'T HAVE MONEY TO GET MORE.: NEVER TRUE

## 2025-06-19 SDOH — ECONOMIC STABILITY: FOOD INSECURITY: WITHIN THE PAST 12 MONTHS, YOU WORRIED THAT YOUR FOOD WOULD RUN OUT BEFORE YOU GOT MONEY TO BUY MORE.: NEVER TRUE

## 2025-06-19 ASSESSMENT — PATIENT HEALTH QUESTIONNAIRE - PHQ9
SUM OF ALL RESPONSES TO PHQ QUESTIONS 1-9: 0
8. MOVING OR SPEAKING SO SLOWLY THAT OTHER PEOPLE COULD HAVE NOTICED. OR THE OPPOSITE, BEING SO FIGETY OR RESTLESS THAT YOU HAVE BEEN MOVING AROUND A LOT MORE THAN USUAL: NOT AT ALL
7. TROUBLE CONCENTRATING ON THINGS, SUCH AS READING THE NEWSPAPER OR WATCHING TELEVISION: NOT AT ALL
SUM OF ALL RESPONSES TO PHQ QUESTIONS 1-9: 0
6. FEELING BAD ABOUT YOURSELF - OR THAT YOU ARE A FAILURE OR HAVE LET YOURSELF OR YOUR FAMILY DOWN: NOT AT ALL
10. IF YOU CHECKED OFF ANY PROBLEMS, HOW DIFFICULT HAVE THESE PROBLEMS MADE IT FOR YOU TO DO YOUR WORK, TAKE CARE OF THINGS AT HOME, OR GET ALONG WITH OTHER PEOPLE: NOT DIFFICULT AT ALL
3. TROUBLE FALLING OR STAYING ASLEEP: NOT AT ALL
4. FEELING TIRED OR HAVING LITTLE ENERGY: NOT AT ALL
SUM OF ALL RESPONSES TO PHQ QUESTIONS 1-9: 0
1. LITTLE INTEREST OR PLEASURE IN DOING THINGS: NOT AT ALL
5. POOR APPETITE OR OVEREATING: NOT AT ALL
9. THOUGHTS THAT YOU WOULD BE BETTER OFF DEAD, OR OF HURTING YOURSELF: NOT AT ALL
2. FEELING DOWN, DEPRESSED OR HOPELESS: NOT AT ALL
SUM OF ALL RESPONSES TO PHQ QUESTIONS 1-9: 0

## 2025-06-19 NOTE — ASSESSMENT & PLAN NOTE
Chronic, unclear control  Urged to complete labs within 30 days  Ordered last year and never completed  Continue Atorvastatin 20 mg daily unchanged

## 2025-06-19 NOTE — PROGRESS NOTES
Chief Complaint:   Mary Velasquez is a 65 y.o. female who presents for complete physical examination.    History of Present Illness:    Cardiomyopathy; dyslipidemia: managed by Dr Grigsby, [facility] cardiology.  Per pt, no changes, follow up in 6 months. She is compliant with Carvedilol, Diovan, and Crestor.   Pt denies chest pain, dyspnea, headache, palpitations, peripheral edema, and tiredness/fatigue.  Pt complains of none.     Depression: states not feeling depressed. Doing well with Wellbutrin, tolerates well.     Chronic knee pain: currently taking Lyrica 150 mg nightly. States nothing is working to help with pain. Eval with [facility] [place]- MRI completed and waiting to hear results and treatment plan      Past Medical History:   Diagnosis Date    Alcoholism (HCC)     Recovering    Cardiomyopathy (HCC)     2021    Chronic left-sided low back pain without sciatica 05/26/2020    Degenerative joint disease     Depression     Fibrocystic disease of breast 06/15/2010    GERD (gastroesophageal reflux disease)     Headache(784.0)     Hemorrhoid 06/15/2010    Hyperlipidemia 06/25/2019    Mitral valve regurgitation     Osteoporosis     Screening mammogram, encounter for 02/04/2018    Dr sherry Rubio MD    Urinary incontinence     Vitamin D deficiency 03/16/2012       Past Surgical History:   Procedure Laterality Date    APPENDECTOMY  01/13/2021    BLADDER SURGERY      with hysterectomy-vaginal approach    BREAST BIOPSY Right 05/2025    benign    BREAST ENHANCEMENT SURGERY Bilateral     implants, saline 2006    BREAST SURGERY  2006    benign cyst on left    CARDIAC CATHETERIZATION  01/29/2021    COLONOSCOPY  11/30/2012    Dr Mohan Lepe    COSMETIC SURGERY      facial 12.11    FACIAL COSMETIC SURGERY      HYSTERECTOMY (CERVIX STATUS UNKNOWN)  1996    KNEE ARTHROPLASTY Left 09/20/2021    OPEN PATELLOFEMORAL  ARTHROPLASTY WITH ROBOTIC NAVIGATION- LEFT KNEE  - KERN & NEPHEW ([id number]) performed by Axel

## 2025-06-19 NOTE — PATIENT INSTRUCTIONS
Lab hours: Monday- Friday 7:30 am-3:30 pm  No appointment necessary, first come first serve.  Sign in at . Nothing but water for 10 hours for fasting labs. Increase water 24 hours before lab draw.

## 2025-07-01 ENCOUNTER — TELEPHONE (OUTPATIENT)
Dept: FAMILY MEDICINE CLINIC | Age: 65
End: 2025-07-01

## 2025-08-19 DIAGNOSIS — G89.29 CHRONIC PAIN OF RIGHT KNEE: ICD-10-CM

## 2025-08-19 DIAGNOSIS — M25.561 CHRONIC PAIN OF RIGHT KNEE: ICD-10-CM

## 2025-08-21 RX ORDER — PREGABALIN 150 MG/1
150 CAPSULE ORAL NIGHTLY
Qty: 90 CAPSULE | Refills: 1 | Status: SHIPPED | OUTPATIENT
Start: 2025-08-21 | End: 2026-02-17

## 2025-08-25 RX ORDER — VIBEGRON 75 MG/1
75 TABLET, FILM COATED ORAL DAILY
Qty: 90 TABLET | Refills: 1 | Status: SHIPPED | OUTPATIENT
Start: 2025-08-25

## 2025-08-28 DIAGNOSIS — F32.0 CURRENT MILD EPISODE OF MAJOR DEPRESSIVE DISORDER WITHOUT PRIOR EPISODE: ICD-10-CM

## 2025-08-28 RX ORDER — BUPROPION HYDROCHLORIDE 300 MG/1
300 TABLET ORAL EVERY MORNING
Qty: 90 TABLET | Refills: 3 | Status: SHIPPED | OUTPATIENT
Start: 2025-08-28

## (undated) DEVICE — 3M™ IOBAN™ 2 ANTIMICROBIAL INCISE DRAPE 6650EZ: Brand: IOBAN™ 2

## (undated) DEVICE — SYRINGE MED 30ML STD CLR PLAS LUERLOCK TIP N CTRL DISP

## (undated) DEVICE — C-ARM: Brand: UNBRANDED

## (undated) DEVICE — APPLICATOR PREP 26ML 0.7% IOD POVACRYLEX 74% ISO ALC ST

## (undated) DEVICE — Device

## (undated) DEVICE — GLOVE ORTHO 8   MSG9480

## (undated) DEVICE — FAIRFIELD KNEE LF

## (undated) DEVICE — DUAL CUT SAGITTAL BLADE

## (undated) DEVICE — SUTURE VCRL + SZ 2-0 L18IN ABSRB UD CT1 L36MM 1/2 CIR VCP839D

## (undated) DEVICE — MASC TURNOVER KIT: Brand: MEDLINE INDUSTRIES, INC.

## (undated) DEVICE — HANDPIECE SET WITH HIGH FLOW TIP AND SUCTION TUBE: Brand: INTERPULSE

## (undated) DEVICE — ELECTRODE PT RET AD L9FT HI MOIST COND ADH HYDRGEL CORDED

## (undated) DEVICE — SUTURE VCRL SZ 2-0 L36IN ABSRB UD L36MM CT-1 1/2 CIR J945H

## (undated) DEVICE — SUTURE MCRYL + SZ 4-0 L27IN ABSRB UD L19MM PS-2 3/8 CIR MCP426H

## (undated) DEVICE — DRESSING WND 4X12 IN ANTIMICROBIAL PROTCT THERABOND 3D

## (undated) DEVICE — DECANTER FLD 9IN ST BG FOR ASEP TRNSF OF FLD

## (undated) DEVICE — GLOVE ORANGE PI 8   MSG9080

## (undated) DEVICE — STERILE TOTAL KNEE DRAPE PACK: Brand: CARDINAL HEALTH

## (undated) DEVICE — T4 HOOD

## (undated) DEVICE — INTENDED FOR TISSUE SEPARATION, AND OTHER PROCEDURES THAT REQUIRE A SHARP SURGICAL BLADE TO PUNCTURE OR CUT.: Brand: BARD-PARKER ® STAINLESS STEEL BLADES

## (undated) DEVICE — SUTURE VCRL SZ 0 L36IN ABSRB UD L36MM CT-1 1/2 CIR J946H

## (undated) DEVICE — 3 BONE CEMENT MIXER: Brand: MIXEVAC

## (undated) DEVICE — ADHESIVE SKIN CLSR 0.7ML TOP DERMBND ADV

## (undated) DEVICE — 450 ML BOTTLE OF 0.05% CHLORHEXIDINE GLUCONATE IN 99.95% STERILE WATER FOR IRRIGATION, USP AND APPLICATOR.: Brand: IRRISEPT ANTIMICROBIAL WOUND LAVAGE

## (undated) DEVICE — HOOD, PEEL-AWAY: Brand: FLYTE

## (undated) DEVICE — COMPONENT KNEE LOWER EXTREMITIES. ANCIL ZIRCONIUM NAVIO DISP

## (undated) DEVICE — CONTAINER,SPECIMEN,OR STERILE,4OZ: Brand: MEDLINE

## (undated) DEVICE — PENCIL ES ULT VAC W TELSCP NOSE EZ CLN BLDE 10FT

## (undated) DEVICE — 2108 SERIES SAGITTAL BLADE (20.7 X 0.88 X 85.0MM)

## (undated) DEVICE — SYSTEM SKIN CLSR 22CM DERMBND PRINEO

## (undated) DEVICE — TOTAL BASIC PK

## (undated) DEVICE — SUTURE STRATAFIX SZ 1 L14IN ABSRB VLT L36MM MO-4 TAPERPOINT SXPD2B400

## (undated) DEVICE — HYPODERMIC SAFETY NEEDLE: Brand: MAGELLAN

## (undated) DEVICE — DEVICE POS W4INXL5YD KNEE SURG FOAM PD SELF ADH WRP DEMAYO

## (undated) DEVICE — ZIMMER® STERILE DISPOSABLE TOURNIQUET CUFF WITH PLC, DUAL PORT, SINGLE BLADDER, 30 IN. (76 CM)

## (undated) DEVICE — SPONGE LAP W18XL18IN WHT COT 4 PLY FLD STRUNG RADPQ DISP ST

## (undated) DEVICE — NAVIO FLAT MARKERS: Brand: NAVIO

## (undated) DEVICE — SUTURE VCRL SZ 0 L36IN ABSRB UD CT-1 L36MM 1/2 CIR TAPR PNT VCP946H

## (undated) DEVICE — SOLUTION IRRIG 3000ML 0.9% SOD CHL USP UROMATIC PLAS CONT

## (undated) DEVICE — DRESSING CNTCT 4X12IN IS THERABOND 3D